# Patient Record
Sex: MALE | Race: WHITE | NOT HISPANIC OR LATINO | Employment: FULL TIME | URBAN - METROPOLITAN AREA
[De-identification: names, ages, dates, MRNs, and addresses within clinical notes are randomized per-mention and may not be internally consistent; named-entity substitution may affect disease eponyms.]

---

## 2018-06-28 ENCOUNTER — OFFICE VISIT (OUTPATIENT)
Dept: ORTHOPEDIC SURGERY | Facility: CLINIC | Age: 53
End: 2018-06-28

## 2018-06-28 VITALS — OXYGEN SATURATION: 98 % | BODY MASS INDEX: 34.4 KG/M2 | WEIGHT: 240.3 LBS | HEIGHT: 70 IN | HEART RATE: 80 BPM

## 2018-06-28 DIAGNOSIS — G56.22 CUBITAL TUNNEL SYNDROME ON LEFT: ICD-10-CM

## 2018-06-28 DIAGNOSIS — M19.022 ARTHRITIS OF LEFT ELBOW: Primary | ICD-10-CM

## 2018-06-28 DIAGNOSIS — G56.03 BILATERAL CARPAL TUNNEL SYNDROME: ICD-10-CM

## 2018-06-28 DIAGNOSIS — M25.521 RIGHT ELBOW PAIN: ICD-10-CM

## 2018-06-28 PROCEDURE — 20526 THER INJECTION CARP TUNNEL: CPT | Performed by: ORTHOPAEDIC SURGERY

## 2018-06-28 PROCEDURE — 20605 DRAIN/INJ JOINT/BURSA W/O US: CPT | Performed by: ORTHOPAEDIC SURGERY

## 2018-06-28 PROCEDURE — 99203 OFFICE O/P NEW LOW 30 MIN: CPT | Performed by: ORTHOPAEDIC SURGERY

## 2018-06-28 RX ORDER — FENOFIBRATE 160 MG/1
160 TABLET ORAL NIGHTLY
Refills: 3 | COMMUNITY
Start: 2018-06-20

## 2018-06-28 RX ORDER — ALPRAZOLAM 0.5 MG/1
0.5 TABLET ORAL 3 TIMES DAILY PRN
Refills: 2 | COMMUNITY
Start: 2018-06-09

## 2018-06-28 RX ORDER — LIDOCAINE HYDROCHLORIDE 10 MG/ML
1 INJECTION, SOLUTION INFILTRATION; PERINEURAL
Status: COMPLETED | OUTPATIENT
Start: 2018-06-28 | End: 2018-06-28

## 2018-06-28 RX ORDER — HYDROCODONE BITARTRATE AND ACETAMINOPHEN 7.5; 325 MG/1; MG/1
1 TABLET ORAL 3 TIMES DAILY PRN
Refills: 0 | COMMUNITY
Start: 2018-05-21 | End: 2019-03-20 | Stop reason: HOSPADM

## 2018-06-28 RX ORDER — TRIAMCINOLONE ACETONIDE 40 MG/ML
20 INJECTION, SUSPENSION INTRA-ARTICULAR; INTRAMUSCULAR
Status: COMPLETED | OUTPATIENT
Start: 2018-06-28 | End: 2018-06-28

## 2018-06-28 RX ORDER — IPRATROPIUM BROMIDE 42 UG/1
1 SPRAY, METERED NASAL 2 TIMES DAILY PRN
COMMUNITY
Start: 2018-05-15

## 2018-06-28 RX ORDER — DICLOFENAC SODIUM 75 MG/1
75 TABLET, DELAYED RELEASE ORAL 2 TIMES DAILY
Refills: 2 | COMMUNITY
Start: 2018-06-11 | End: 2019-01-28

## 2018-06-28 RX ORDER — TESTOSTERONE CYPIONATE 200 MG/ML
INJECTION, SOLUTION INTRAMUSCULAR
Refills: 1 | COMMUNITY
Start: 2018-06-19

## 2018-06-28 RX ORDER — LISINOPRIL 20 MG/1
20 TABLET ORAL DAILY
Refills: 3 | COMMUNITY
Start: 2018-06-12

## 2018-06-28 RX ORDER — MONTELUKAST SODIUM 10 MG/1
10 TABLET ORAL NIGHTLY
Refills: 5 | COMMUNITY
Start: 2018-06-12

## 2018-06-28 RX ORDER — DESLORATADINE 5 MG/1
TABLET ORAL
Refills: 6 | COMMUNITY
Start: 2018-06-12 | End: 2019-03-20 | Stop reason: HOSPADM

## 2018-06-28 RX ORDER — ATORVASTATIN CALCIUM 40 MG/1
40 TABLET, FILM COATED ORAL DAILY
Refills: 3 | COMMUNITY
Start: 2018-06-20

## 2018-06-28 RX ORDER — AMLODIPINE BESYLATE 5 MG/1
5 TABLET ORAL DAILY
Refills: 3 | COMMUNITY
Start: 2018-06-12

## 2018-06-28 RX ADMIN — TRIAMCINOLONE ACETONIDE 20 MG: 40 INJECTION, SUSPENSION INTRA-ARTICULAR; INTRAMUSCULAR at 10:55

## 2018-06-28 RX ADMIN — TRIAMCINOLONE ACETONIDE 20 MG: 40 INJECTION, SUSPENSION INTRA-ARTICULAR; INTRAMUSCULAR at 10:57

## 2018-06-28 RX ADMIN — TRIAMCINOLONE ACETONIDE 20 MG: 40 INJECTION, SUSPENSION INTRA-ARTICULAR; INTRAMUSCULAR at 10:51

## 2018-06-28 RX ADMIN — LIDOCAINE HYDROCHLORIDE 1 ML: 10 INJECTION, SOLUTION INFILTRATION; PERINEURAL at 10:57

## 2018-06-28 RX ADMIN — LIDOCAINE HYDROCHLORIDE 1 ML: 10 INJECTION, SOLUTION INFILTRATION; PERINEURAL at 10:51

## 2018-06-28 RX ADMIN — LIDOCAINE HYDROCHLORIDE 1 ML: 10 INJECTION, SOLUTION INFILTRATION; PERINEURAL at 10:55

## 2018-06-28 NOTE — PROGRESS NOTES
[facility] [place] [facility]opa[facility] Surgery Clinic Note    Subjective     Pain of the Left Elbow (Left elbow pain/Started about 3 weeks ago/Pain is at a 1-2 today/Patient has taken ibuprofen, and diclofenac.  Wears a brace some.)      FADIA Iverson is a 52 y.o. male. Patient is here today for several problems.  He has numbness and tingling in both of his hands right greater than left.  He occasionally will get shooting pain down the ulnar side of his forearm and hand when he does too much with his elbow.  He was bulging his girlfriend's yard recently when he experienced significant left elbow swelling and pain.  He is tried over-the-counter medications.  He denies any history of major trauma to the left elbow.  He has been wearing some braces at night for his hands and they've helped a little bit.     Past Medical History:   Diagnosis Date   • Hyperlipidemia    • Hypertriglyceridemia       Past Surgical History:   Procedure Laterality Date   • HERNIA REPAIR     • KNEE SURGERY Right 2010    scope   • SHOULDER SURGERY Right 2010    labrium and rotator cuff      Family History   Problem Relation Age of Onset   • No Known Problems Mother    • Cancer Father      Social History     Social History   • Marital status: Single     Spouse name: N/A   • Number of children: N/A   • Years of education: N/A     Occupational History   • Not on file.     Social History Main Topics   • Smoking status: Never Smoker   • Smokeless tobacco: Never Used   • Alcohol use Yes      Comment: 3-4   • Drug use: No   • Sexual activity: Defer     Other Topics Concern   • Not on file     Social History Narrative   • No narrative on file      No current outpatient prescriptions on file prior to visit.     No current facility-administered medications on file prior to visit.       Allergies   Allergen Reactions   • Keflex [Cephalexin] Hives        The following portions of the patient's history were reviewed and updated as appropriate: allergies, current  "medications, past family history, past medical history, past social history, past surgical history and problem list.    Review of Systems   Constitutional: Negative.    HENT: Positive for congestion.    Eyes: Negative.    Respiratory: Negative.    Cardiovascular: Negative.    Gastrointestinal: Negative.    Endocrine: Negative.    Genitourinary: Negative.    Musculoskeletal: Positive for arthralgias and joint swelling.   Skin: Negative.    Allergic/Immunologic: Positive for environmental allergies.   Neurological: Negative.    Psychiatric/Behavioral: Negative.         Objective      Physical Exam  Pulse 80   Ht 176.5 cm (69.5\")   Wt 109 kg (240 lb 4.8 oz)   SpO2 98%   BMI 34.98 kg/m²     Body mass index is 34.98 kg/m².    General  Mental Status - alert  General Appearance - cooperative, well groomed, not in acute distress  Orientation - Oriented X3  Build & Nutrition - well developed and well nourished  Posture - normal posture  Gait - normal gait     Integumentary  Global Assessment  Examination of related systems reveals - no lymphadenopathy  General Characteristics  Overall examination of the patient's skin reveals - no rashes, no evidence of scars, no suspicious lesions and no bruises.  Color - normal coloration of skin.    Ortho Exam  Peripheral Vascular   Bilateral Upper Extremity    No cyanotic nail beds    Pink nail beds and rapid capillary refill   Palpation    Radial Pulse - Bilaterally normal    Neurologic   Sensory: Light touch intact- Right and left hand    Left Upper Extremity    Left wrist extensors: 5/5    Left wrist flexors: 5/5    Left intrinsics: 5/5      Right Upper Extremity    Right wrist extensors: 5/5    Right wrist flexors: 5/5    Right intrinsics: 5/5    Musculoskeletal   Left Elbow    Forearm supination: AROM - 70 degrees    Forearm pronation: AROM - 70 degrees    Flexion: 120    Extension: 15     Inspection and Palpation   Right Wrist      Tenderness - none    Swelling - " none    Crepitus - none    Muscle tone - no atrophy   Left Wrist    Tenderness - none    Swelling - none    Crepitus - none    Muscle tone - no atrophy     ROJM:   Left Wrist    Flexion: AROM - 90 degrees    Extension: AROM - 90 degrees   Right Wrist    Flexion: AROM - 90 degrees    Extension: AROM - 90 degrees     Deformities, Malalignments, Discrepancies    None     Functional Testing   Right    Tinel's Sign-- negative    Phalen's Sign--negative    Carpal Compression Test--positive    Thenar wasting--minimal    APB--4+/5    Elbow Flexion test--negative    Cubital tunnel signs--negative   Left     Tinel's Sign--negative    Phalen's Sign--negative    Carpal Compression Test-- positive    Thenar Wasting--minimal    APB--4+/5    Elbow Flexion test--mildly positive    Cubital tunnel signs--mildly positive       Strength and Tone    Right  strength: good    Left  strength: good         Imaging/Studies  Imaging Results (last 24 hours)     Procedure Component Value Units Date/Time    XR Elbow 3+ View Left [[id number]] Resulted:  06/28/18 1032     Updated:  06/28/18 1032    Narrative:       Left Elbow X-Ray    Indication: Pain    Views: AP, Oblique, and Lateral views    Comparison: None    Findings:  No fracture  No bony lesion  Normal soft tissues  Patient has moderate arthritis at the ulnohumeral joint.  There are   osteophytes seen posteriorly.    Impression: Osteoarthritis left elbow.              We have reviewed nerve studies of the patient's bilateral upper extremities read by Dr. Floyd in [place].  Patient has median nerve motor latency of 4.6 ms on the left and 5.2 ms on the right.  Interpretation of a moderate median nerve entrapment bilaterally.  No mention of a cubital tunnel syndrome or ulnar neuropathy.    Assessment:  1. Arthritis of left elbow    2. Right elbow pain    3. Cubital tunnel syndrome on left    4. Bilateral carpal tunnel syndrome        Plan:  1. Continue over-the-counter medications as  needed for discomfort  2. Diagnostic and therapeutic injection will be given into each carpal tunnel today  3. Patient continues to have left elbow pain and swelling and we will inject his ulnar humeral joint for diagnostic and therapeutic purposes as well.  4. We will assess efficacy of the injections into the carpal tunnel as well as a left elbow when he returns in about 4-6 weeks.  If the patient ever undergoes surgery on the left carpal tunnel, we need to keep in mind that he may need a cubital tunnel release at the same time to get complete resolution of his neurologic symptoms.      Medical Decision Making  Management Options : over-the-counter medicine and prescription/IM medicine  Data/Risk: radiology tests and independent visualization of imaging, lab tests, or EMG/NCV    Rolando Mosqueda MD  06/28/18  6:57 PM

## 2018-06-28 NOTE — PROGRESS NOTES
Procedure   Medium Joint Arthrocentesis  Date/Time: 6/28/2018 10:51 AM  Consent given by: patient  Site marked: site marked  Timeout: Immediately prior to procedure a time out was called to verify the correct patient, procedure, equipment, support staff and site/side marked as required   Supporting Documentation  Indications: pain   Procedure Details  Location: wrist - R intercarpal  Preparation: Patient was prepped and draped in the usual sterile fashion  Needle size: 25 G (short)  Approach: volar  Medications administered: 1 mL lidocaine 1 %; 20 mg triamcinolone acetonide 40 MG/ML  Patient tolerance: patient tolerated the procedure well with no immediate complications    Medium Joint Arthrocentesis  Date/Time: 6/28/2018 10:55 AM  Consent given by: patient  Site marked: site marked  Timeout: Immediately prior to procedure a time out was called to verify the correct patient, procedure, equipment, support staff and site/side marked as required   Supporting Documentation  Indications: pain   Procedure Details  Location: wrist - L intercarpal  Preparation: Patient was prepped and draped in the usual sterile fashion  Needle size: 25 G (short)  Approach: volar  Medications administered: 1 mL lidocaine 1 %; 20 mg triamcinolone acetonide 40 MG/ML  Patient tolerance: patient tolerated the procedure well with no immediate complications    Medium Joint Arthrocentesis  Date/Time: 6/28/2018 10:57 AM  Consent given by: patient  Site marked: site marked  Timeout: Immediately prior to procedure a time out was called to verify the correct patient, procedure, equipment, support staff and site/side marked as required   Supporting Documentation  Indications: pain   Procedure Details  Location: elbow - L elbow  Preparation: Patient was prepped and draped in the usual sterile fashion  Needle size: 25 G  Approach: superior  Medications administered: 1 mL lidocaine 1 %; 20 mg triamcinolone acetonide 40 MG/ML

## 2018-08-14 ENCOUNTER — OFFICE VISIT (OUTPATIENT)
Dept: ORTHOPEDIC SURGERY | Facility: CLINIC | Age: 53
End: 2018-08-14

## 2018-08-14 VITALS — HEART RATE: 83 BPM | BODY MASS INDEX: 34.68 KG/M2 | WEIGHT: 234.13 LBS | OXYGEN SATURATION: 98 % | HEIGHT: 69 IN

## 2018-08-14 DIAGNOSIS — G56.03 BILATERAL CARPAL TUNNEL SYNDROME: Primary | ICD-10-CM

## 2018-08-14 DIAGNOSIS — M25.521 RIGHT ELBOW PAIN: ICD-10-CM

## 2018-08-14 DIAGNOSIS — M19.022 ARTHRITIS OF LEFT ELBOW: ICD-10-CM

## 2018-08-14 DIAGNOSIS — G56.22 CUBITAL TUNNEL SYNDROME ON LEFT: ICD-10-CM

## 2018-08-14 PROCEDURE — 99214 OFFICE O/P EST MOD 30 MIN: CPT | Performed by: ORTHOPAEDIC SURGERY

## 2018-08-14 RX ORDER — SILDENAFIL CITRATE 20 MG/1
TABLET ORAL
Refills: 5 | COMMUNITY
Start: 2018-08-06

## 2018-08-14 RX ORDER — SYRINGE WITH NEEDLE, 1 ML 25GX5/8"
SYRINGE, EMPTY DISPOSABLE MISCELLANEOUS SEE ADMIN INSTRUCTIONS
Refills: 5 | COMMUNITY
Start: 2018-07-16 | End: 2019-03-07

## 2018-08-14 NOTE — PROGRESS NOTES
"    [facility] [place] Orthopaedic Surgery Clinic Note    Subjective     CC: Follow-up (7 week f/u Cubital tunnel syndrome on left; Bilateral carpal tunnel syndrome )      FADIA Iverson is a 52 y.o. male.  Patient returns today for follow-up of his multiple problems.  His initial visit with me, his left ulnohumeral joint was injected in both carpal tunnels were injected.  He got dramatic relief from all 3 injections.  The numbness and tingling and fullness of his hands has improved dramatically and unfortunately starting to come back however.  Patient's elbow pain is much better overall.       ROS:    Constiutional:Pt denies fever, chills, nausea, or vomiting.  MSK:as above    Objective      Past Medical History  Past Medical History:   Diagnosis Date   • Hyperlipidemia    • Hypertriglyceridemia          Physical Exam  Pulse 83   Ht 176.5 cm (69.49\")   Wt 106 kg (234 lb 2.1 oz)   SpO2 98%   BMI 34.09 kg/m²     Body mass index is 34.09 kg/m².    Patient is well nourished and well developed.        Ortho Exam  Peripheral Vascular   Bilateral Upper Extremity    No cyanotic nail beds    Pink nail beds and rapid capillary refill   Palpation    Radial Pulse - Bilaterally normal    Neurologic   Sensory: Light touch intact- Right and left hand    Left Upper Extremity    Left wrist extensors: 5/5    Left wrist flexors: 5/5    Left intrinsics: 5/5   Right Upper Extremity    Right wrist extensors: 5/5    Right wrist flexors: 5/5    Right intrinsics: 5/5    Musculoskeletal   Left Elbow    Forearm supination: AROM - 90 degrees    Forearm pronation: AROM - 90 degrees   Right Elbow    Forearm supination: AROM - 90 degrees    Forearm pronation: AROM - 90 degrees     Inspection and Palpation   Right Wrist      Tenderness - none    Swelling - none    Crepitus - none    Muscle tone - no atrophy   Left Wrist    Tenderness - none    Swelling - none    Crepitus - none    Muscle tone - no atrophy     ROJM:   Left Wrist    Flexion: AROM - " 90 degrees    Extension: AROM - 90 degrees   Right Wrist    Flexion: AROM - 90 degrees    Extension: AROM - 90 degrees     Deformities, Malalignments, Discrepancies    None     Functional Testing   Right Wrist    Tinel's Sign negative    Phalen's Sign negative    Carpal Compression Test negative   Left Wrist    Tinel's Sign negative    Phalen's Sign negative    Carpal Compression Test negative       Strength and Tone    Right  strength: good    Left  strength: good        Assessment:  1. Arthritis of left elbow    2. Right elbow pain    3. Cubital tunnel syndrome on left    4. Bilateral carpal tunnel syndrome        Plan:  1. Recommend over the counter anti-inflammatories for pain and/or swelling  2. Patient I believe would be a good candidate for bilateral carpal tunnel release.  He has received excellent results from his injections.  We discussed the risks, benefits, potential hazards, typical recovery and rehabilitation as well as reasonable alternatives to carpal tunnel release.  Patient will need to choose which side he wants to do first and then 3 weeks later, the contralateral side can be done.  He discussed this plan with the patient and his significant other today who accompanies him.  They will call back for scheduling.  We can leave the ulnar nerve alone for now.        Medical Decision Making  Management Options : over-the-counter medicine and major surgery with risk factors      Rolando Mosqueda MD  08/14/18  5:29 PM   wheezing

## 2018-11-08 ENCOUNTER — OFFICE VISIT (OUTPATIENT)
Dept: ORTHOPEDIC SURGERY | Facility: CLINIC | Age: 53
End: 2018-11-08

## 2018-11-08 VITALS — WEIGHT: 247.14 LBS | HEART RATE: 82 BPM | HEIGHT: 70 IN | BODY MASS INDEX: 35.38 KG/M2 | OXYGEN SATURATION: 98 %

## 2018-11-08 DIAGNOSIS — M16.11 ARTHRITIS OF RIGHT HIP: ICD-10-CM

## 2018-11-08 DIAGNOSIS — M25.551 RIGHT HIP PAIN: Primary | ICD-10-CM

## 2018-11-08 DIAGNOSIS — G56.03 BILATERAL CARPAL TUNNEL SYNDROME: ICD-10-CM

## 2018-11-08 PROCEDURE — 99214 OFFICE O/P EST MOD 30 MIN: CPT | Performed by: PHYSICIAN ASSISTANT

## 2018-11-08 NOTE — PROGRESS NOTES
"        [facility] [place] [facility] [place]    Subjective     Patient: Patricio Iverson  : 1965    Primary Care Provider: Sarbjit Rebolledo MD    Requesting Provider: As above    Pain of the Right Hip      History    Chief Complaint: Right hip pain    History of Present Illness: This is a very pleasant 52 yo male patient of Dr. Ashley, new to me, presenting with 4 to 6 week history of right groin and buttock pain.  He denies any trauma or injury.  He has both weight bearing and night pain.  He complains of some radiating anterior thigh pain.  He has acing, stabbing and shooting pain that is 5/10.  He denies any mechanical symptoms.  He has a history of sciatica and thought that this pain was coming from his back.  He has treated with ice, ibuprofen, and rest without improved pain.  He is here for further evaluation and treatment recommendations.    Current Outpatient Prescriptions on File Prior to Visit   Medication Sig Dispense Refill   • ALPRAZolam (XANAX) 0.5 MG tablet Take 0.5 mg by mouth 3 (Three) Times a Day As Needed.  2   • amLODIPine (NORVASC) 5 MG tablet Take 5 mg by mouth Daily.  3   • atorvastatin (LIPITOR) 40 MG tablet Take 40 mg by mouth Daily.  3   • B-D 3CC LUER-KIET SYR 23GX1\" 23G X 1\" 3 ML misc See Admin Instructions.  5   • desloratadine (CLARINEX) 5 MG tablet TAKE ONE TABLET BY MOUTH EVERY DAY AT BEDTIME AS DIRECTED  6   • diclofenac (VOLTAREN) 75 MG EC tablet Take 75 mg by mouth 2 (Two) Times a Day.  2   • fenofibrate 160 MG tablet Take 160 mg by mouth Daily.  3   • HYDROcodone-acetaminophen (NORCO) 7.5-325 MG per tablet Take 1 tablet by mouth 3 (Three) Times a Day As Needed.  0   • ipratropium (ATROVENT) 0.06 % nasal spray      • lisinopril (PRINIVIL,ZESTRIL) 20 MG tablet Take 20 mg by mouth Daily.  3   • montelukast (SINGULAIR) 10 MG tablet Take 10 mg by mouth Daily.  5   • sildenafil (REVATIO) 20 MG tablet TAKE UP TO 3 TABLETS BY MOUTH EVERY DAY AS NEEDED  5   • Testosterone " "Cypionate (DEPOTESTOTERONE CYPIONATE) 200 MG/ML injection INJECT 1 ML IM EVERY 10 DAYS  1     No current facility-administered medications on file prior to visit.       Allergies   Allergen Reactions   • Keflex [Cephalexin] Hives      Past Medical History:   Diagnosis Date   • Hyperlipidemia    • Hypertriglyceridemia      Past Surgical History:   Procedure Laterality Date   • DENTAL PROCEDURE  08/2018    Dental Implants   • HERNIA REPAIR     • KNEE SURGERY Right 2010    scope   • SHOULDER SURGERY Right 2010    labrium and rotator cuff     Family History   Problem Relation Age of Onset   • No Known Problems Mother    • Cancer Father       Social History     Social History   • Marital status: Single     Spouse name: N/A   • Number of children: N/A   • Years of education: N/A     Occupational History   • Not on file.     Social History Main Topics   • Smoking status: Never Smoker   • Smokeless tobacco: Never Used   • Alcohol use Yes      Comment: 3-4   • Drug use: No   • Sexual activity: Defer     Other Topics Concern   • Not on file     Social History Narrative   • No narrative on file        Review of Systems   HENT: Positive for postnasal drip and sinus pressure.    Eyes: Negative.    Respiratory: Negative.    Cardiovascular: Negative.    Gastrointestinal: Negative.    Endocrine: Negative.    Genitourinary: Negative.    Musculoskeletal: Positive for arthralgias (hip pain) and back pain.   Skin: Negative.    Allergic/Immunologic: Negative.    Neurological: Positive for weakness.   Hematological: Negative.    Psychiatric/Behavioral: Negative.        The following portions of the patient's history were reviewed and updated as appropriate: allergies, current medications, past family history, past medical history, past social history, past surgical history and problem list.      Objective      Physical Exam  Pulse 82   Ht 177.8 cm (70\")   Wt 112 kg (247 lb 2.2 oz)   SpO2 98%   BMI 35.46 kg/m²     Body mass index is " 35.46 kg/m².    Patient is well developed, well nourished    Ortho Exam    Right  hip    RANGE OF MOTION:   FLEXION CONTRACTURE: None   FLEXION: 110 degrees   INTERNAL ROTATION: 10 degrees at 90 degrees of flexion   EXTERNAL ROTATION: 40 degrees at 90 degrees of flexion    PAIN WITH HIP MOTION: moderate pain   PAIN WITH LOGROLL: no  STINCHFIELD TEST: mildly positive    STRENGTH:  5/5 hip adduction, abduction, flexion. 5/5 strength knee flexion, extension. 5/5 strength ankle dorsiflexion and plantarflexion.     GREATER TROCHANTER BURSAL PAIN:  No    SENSATION TO LIGHT TOUCH:  DEEP PERONEAL/SUPERFICIAL PERONEAL/SURAL/SAPHENOUS/TIBIAL:  intact    STRAIGHT LEG TEST:   negative      Left hip exam:    RANGE OF MOTION:   FLEXION CONTRACTURE: None   FLEXION: 110 degrees   INTERNAL ROTATION: 20 degrees at 90 degrees of flexion   EXTERNAL ROTATION: 40 degrees at 90 degrees of flexion    PAIN WITH HIP MOTION: no  PAIN WITH LOGROLL: no  STINCHFIELD TEST: negative    STRENGTH:  5/5 hip adduction, abduction, flexion. 5/5 strength knee flexion, extension. 5/5 strength ankle dorsiflexion and plantarflexion.     GREATER TROCHANTER BURSAL PAIN:  No    SENSATION TO LIGHT TOUCH:  DEEP PERONEAL/SUPERFICIAL PERONEAL/SURAL/SAPHENOUS/TIBIAL:  intact    STRAIGHT LEG TEST:   negative      Medical Decision Making    Data Review:   ordered and reviewed x-rays today    Assessment:  1. Right hip pain    2. Arthritis of right hip    3. Bilateral carpal tunnel syndrome        Plan:  1.  Right hip arthritis.  I reviewed  today's x-rays and clinical findings the patient.  On exam, he has stiffness of the right hip with mildly positive Stinchfield.  X-rays today show end-stage right hip arthritis with no evidence of fracture or anything more worrisome.  We discussed further treatment options for hip arthritis including intra-articular Tickler steroid injection or hip replacement.  In the long run, he is going to need hip replacement.  We had a  discussion regarding intra-articular hip injection and that is not as reliable as an injection in the knee.  I explained that he cannot be done in the office but has to be done under fluoroscopy.  Patient has a busy winter ahead with pain hallux and is not ready to pursue surgery immediately.  I told him should he have intra-articular injection, he would have to wait 3 months to have surgery.  Plan today is that he'll return next week on Wednesday well and with Dr. Graham to possibly schedule an intra-articular hip injection.  I told him that Dr. Graham is scheduling out to mid February for joint replacement at this time anyway.  This will give him 3 months.  After injection.  I will see him back next Wednesday.    2.  Patient has bilateral carpal tunnel syndrome and has been evaluated in the past by Dr. Mosqueda.  The right is more painful than the left.  He is ready to pursue right carpal tunnel release.  We will have him see Marielena on his way out to schedule that surgery.  Serenity Moeller PA-C  11/08/18  4:52 PM

## 2018-11-14 ENCOUNTER — OFFICE VISIT (OUTPATIENT)
Dept: ORTHOPEDIC SURGERY | Facility: CLINIC | Age: 53
End: 2018-11-14

## 2018-11-14 VITALS — HEIGHT: 70 IN | HEART RATE: 94 BPM | WEIGHT: 246.91 LBS | OXYGEN SATURATION: 98 % | BODY MASS INDEX: 35.35 KG/M2

## 2018-11-14 DIAGNOSIS — M16.11 ARTHRITIS OF RIGHT HIP: Primary | ICD-10-CM

## 2018-11-14 PROCEDURE — 99213 OFFICE O/P EST LOW 20 MIN: CPT | Performed by: PHYSICIAN ASSISTANT

## 2018-11-14 NOTE — PROGRESS NOTES
"        [facility] [place] Orthopaedic Surgery Clinic Note    Subjective     Patient: Patricio Iverson  : 1965    Primary Care Provider: Sarbjit Rebolledo MD    Requesting Provider: As above    Follow-up of the Right Hip (1 week)      History    Chief Complaint: Right groin pain    History of Present Illness: Patient returns today discuss his further treatment options for his end-stage right hip arthritis with Dr. Graham.  He reports no change in his pain since I saw him last week.  He continues to have daily pain that is 6-7/10 and stabbing and aching.    Current Outpatient Medications on File Prior to Visit   Medication Sig Dispense Refill   • ALPRAZolam (XANAX) 0.5 MG tablet Take 0.5 mg by mouth 3 (Three) Times a Day As Needed.  2   • amLODIPine (NORVASC) 5 MG tablet Take 5 mg by mouth Daily.  3   • atorvastatin (LIPITOR) 40 MG tablet Take 40 mg by mouth Daily.  3   • B-D 3CC LUER-KIET SYR 23GX1\" 23G X 1\" 3 ML misc See Admin Instructions.  5   • desloratadine (CLARINEX) 5 MG tablet TAKE ONE TABLET BY MOUTH EVERY DAY AT BEDTIME AS DIRECTED  6   • diclofenac (VOLTAREN) 75 MG EC tablet Take 75 mg by mouth 2 (Two) Times a Day.  2   • fenofibrate 160 MG tablet Take 160 mg by mouth Daily.  3   • HYDROcodone-acetaminophen (NORCO) 7.5-325 MG per tablet Take 1 tablet by mouth 3 (Three) Times a Day As Needed.  0   • ipratropium (ATROVENT) 0.06 % nasal spray      • lisinopril (PRINIVIL,ZESTRIL) 20 MG tablet Take 20 mg by mouth Daily.  3   • montelukast (SINGULAIR) 10 MG tablet Take 10 mg by mouth Daily.  5   • sildenafil (REVATIO) 20 MG tablet TAKE UP TO 3 TABLETS BY MOUTH EVERY DAY AS NEEDED  5   • Testosterone Cypionate (DEPOTESTOTERONE CYPIONATE) 200 MG/ML injection INJECT 1 ML IM EVERY 10 DAYS  1     No current facility-administered medications on file prior to visit.       Allergies   Allergen Reactions   • Keflex [Cephalexin] Hives      Past Medical History:   Diagnosis Date   • Hyperlipidemia    • Hypertriglyceridemia  " "    Past Surgical History:   Procedure Laterality Date   • DENTAL PROCEDURE  08/2018    Dental Implants   • HERNIA REPAIR     • KNEE SURGERY Right 2010    scope   • SHOULDER SURGERY Right 2010    labrium and rotator cuff     Family History   Problem Relation Age of Onset   • No Known Problems Mother    • Cancer Father       Social History     Socioeconomic History   • Marital status: Single     Spouse name: Not on file   • Number of children: Not on file   • Years of education: Not on file   • Highest education level: Not on file   Social Needs   • Financial resource strain: Not on file   • Food insecurity - worry: Not on file   • Food insecurity - inability: Not on file   • Transportation needs - medical: Not on file   • Transportation needs - non-medical: Not on file   Occupational History   • Not on file   Tobacco Use   • Smoking status: Never Smoker   • Smokeless tobacco: Never Used   Substance and Sexual Activity   • Alcohol use: Yes     Comment: 3-4   • Drug use: No   • Sexual activity: Defer   Other Topics Concern   • Not on file   Social History Narrative   • Not on file        Review of Systems   Constitutional: Negative.    HENT: Negative.    Eyes: Negative.    Respiratory: Negative.    Cardiovascular: Negative.    Gastrointestinal: Negative.    Endocrine: Negative.    Genitourinary: Negative.    Musculoskeletal: Positive for arthralgias.   Skin: Negative.    Allergic/Immunologic: Negative.    Neurological: Negative.    Hematological: Negative.    Psychiatric/Behavioral: Negative.        The following portions of the patient's history were reviewed and updated as appropriate: allergies, current medications, past family history, past medical history, past social history, past surgical history and problem list.      Objective      Physical Exam  Pulse 94   Ht 177.8 cm (70\")   Wt 112 kg (246 lb 14.6 oz)   SpO2 98%   BMI 35.43 kg/m²     Body mass index is 35.43 kg/m².    Patient is well nourished and well " developed.      Ortho Exam  Right  hip    RANGE OF MOTION:   Stiffness with groin pain reproduced with hip rotation     STRENGTH:  5/5 hip adduction, abduction, flexion. 5/5 strength knee flexion, extension. 5/5 strength ankle dorsiflexion and plantarflexion.       Medical Decision Making    Data Review:   none    Assessment:  1. Arthritis of right hip        Plan:  End-stage right hip arthritis.  I reviewed clinical findings, past and current treatment the patient today patient.  Patient is here to discuss his further options with Dr. Graham including intra-articular steroid injection versus total hip replacement.  We discussed with the patient that given his young age of 53, should he have hip replacement at this point he would likely need revision.  I would recommend an intra-articular joint injection prior to jumping to hip replacement.  I explained the use of the intra-articular injection to be both therapeutic and diagnostic.  There is some question by the patient whether all of his pain is coming from her hip or some is coming from his back.  Injection loss so show us how much relief he will get from the hip replacement even if he has released from the injection for only couple of days.  I explained Dr. Graham does his intra-articular injections at the outpatient surgery center.  Plan today is to get him scheduled for right hip intra-articular injection with Dr. Graham.        11/15/18  3:08 PM

## 2018-11-30 ENCOUNTER — OUTSIDE FACILITY SERVICE (OUTPATIENT)
Dept: ORTHOPEDIC SURGERY | Facility: CLINIC | Age: 53
End: 2018-11-30

## 2018-11-30 PROCEDURE — 20610 DRAIN/INJ JOINT/BURSA W/O US: CPT | Performed by: ORTHOPAEDIC SURGERY

## 2018-11-30 PROCEDURE — 77002 NEEDLE LOCALIZATION BY XRAY: CPT | Performed by: ORTHOPAEDIC SURGERY

## 2018-12-03 ENCOUNTER — OUTSIDE FACILITY SERVICE (OUTPATIENT)
Dept: ORTHOPEDIC SURGERY | Facility: CLINIC | Age: 53
End: 2018-12-03

## 2018-12-03 PROCEDURE — 64721 CARPAL TUNNEL SURGERY: CPT | Performed by: ORTHOPAEDIC SURGERY

## 2018-12-18 ENCOUNTER — OFFICE VISIT (OUTPATIENT)
Dept: ORTHOPEDIC SURGERY | Facility: CLINIC | Age: 53
End: 2018-12-18

## 2018-12-18 DIAGNOSIS — Z98.890 S/P CARPAL TUNNEL RELEASE: ICD-10-CM

## 2018-12-18 DIAGNOSIS — G56.03 BILATERAL CARPAL TUNNEL SYNDROME: Primary | ICD-10-CM

## 2018-12-18 PROCEDURE — 99024 POSTOP FOLLOW-UP VISIT: CPT | Performed by: ORTHOPAEDIC SURGERY

## 2018-12-18 RX ORDER — GABAPENTIN 800 MG/1
400 TABLET ORAL 2 TIMES DAILY
Refills: 4 | COMMUNITY
Start: 2018-12-05

## 2018-12-18 NOTE — PROGRESS NOTES
[facility] [place] Orthopaedic Surgery Clinic Note    Subjective     Post-op (2 WEEK F/U , S/P RIGHT CARPAL TUNNEL RELEASE 12/03/18)       HPI    Patricio Iverson is a 53 y.o. male.  Patient is here today 2 weeks out from right carpal tunnel release.  He has been working and getting his hands dirty.  He denies fever, chills, nausea or vomiting.  He is with his girlfriend today.        Objective      Physical Exam  There were no vitals taken for this visit.    There is no height or weight on file to calculate BMI.        Ortho Exam    Right Upper Extremity:        Musculoskeletal     Inspection and Palpation:      Hand/Wrist:      Tenderness - none    Swelling - minimal     ROM:  Slightly diminished but within normal limits       Incision:  Healing appropriately, no drainage or erythema.  There is mild dehiscence without any deep dehiscence and drainage.          Assessment:  1. Bilateral carpal tunnel syndrome    2. S/P carpal tunnel release        Plan:  1. Suture removal today  2. Dressing changes  3. Overall, patient has been told to take it easier and do less with the hand to allow this to dry out and heal      Rolando Mosqueda MD  12/18/18  7:02 PM

## 2019-01-28 ENCOUNTER — OFFICE VISIT (OUTPATIENT)
Dept: ORTHOPEDIC SURGERY | Facility: CLINIC | Age: 54
End: 2019-01-28

## 2019-01-28 VITALS — HEART RATE: 82 BPM | WEIGHT: 242.95 LBS | BODY MASS INDEX: 34.78 KG/M2 | OXYGEN SATURATION: 98 % | HEIGHT: 70 IN

## 2019-01-28 DIAGNOSIS — M16.11 PRIMARY OSTEOARTHRITIS OF RIGHT HIP: Primary | ICD-10-CM

## 2019-01-28 PROCEDURE — 99213 OFFICE O/P EST LOW 20 MIN: CPT | Performed by: ORTHOPAEDIC SURGERY

## 2019-01-28 RX ORDER — PREGABALIN 150 MG/1
150 CAPSULE ORAL ONCE
Status: CANCELLED | OUTPATIENT
Start: 2019-01-28 | End: 2019-01-28

## 2019-01-28 RX ORDER — ACETAMINOPHEN 325 MG/1
1000 TABLET ORAL ONCE
Status: CANCELLED | OUTPATIENT
Start: 2019-01-28 | End: 2019-01-28

## 2019-01-28 RX ORDER — CHLORHEXIDINE GLUCONATE 4 G/100ML
SOLUTION TOPICAL DAILY
Qty: 236 ML | Refills: 0 | Status: ON HOLD | OUTPATIENT
Start: 2019-01-28 | End: 2019-03-19

## 2019-01-28 RX ORDER — MELOXICAM 7.5 MG/1
15 TABLET ORAL ONCE
Status: CANCELLED | OUTPATIENT
Start: 2019-01-28 | End: 2019-01-28

## 2019-01-28 NOTE — PROGRESS NOTES
I reviewed my findings with patient today. He would like to proceed with hip replacement surgery, knowing the risks, benefits, and alternatives.  Please see my counseling note for details.  He has exhausted conservative treatment options.      Surgical Counseling     I have informed the patient of the diagnosis and the prognosis.  Exhaustive conservative treatment modalities have not resulted in long term pain relief.  The symptoms have progressed to the point of daily pain and inability to perform activities of daily living without significant pain.  The patient has reached the point of desiring to proceed with total hip arthroplasty after discussing the risks, benefits and alternatives to the procedure.  The surgical procedure itself was discussed in detail.  Risks of the procedure were discussed, which included but are not limited to, bleeding, infection, damage to blood vessels and nerves, incomplete pain relief, loosening of the prosthesis, deep infection, need for further surgery, leg length discrepancy, hip dislocation, loss of limb, deep venous thrombosis, pulmonary embolus, death, heart attack, stroke, kidney failure, liver failure, and anesthetic complications.  In addition, the potential for deep infection developing in the future was discussed, which could require further surgery.  The hip would have to be re-opened, debrided, and potentially remove the prosthesis, which may or may not be replaced in the future.  Also, the possibility for loosening of the prosthesis has been mentioned.  If the prosthesis loosened, a revision arthroplasty could be performed, with results that are not as predictable compared to the original procedure.  The typical rehabilitative course has also been discussed, and full recovery may take up to a year to see the maximum benefit.  The importance of patient cooperation in the rehabilitative efforts has also been discussed.  No guarantees whatsoever were given.  The patient  understands the potential risks versus the benefits and desires to proceed with total hip arthroplasty at a mutually convenient time.      Medical Decision Making  Management Options : major surgery with risk factors      Anil Graham MD  01/28/19  12:46 PM

## 2019-01-29 ENCOUNTER — OFFICE VISIT (OUTPATIENT)
Dept: ORTHOPEDIC SURGERY | Facility: CLINIC | Age: 54
End: 2019-01-29

## 2019-01-29 DIAGNOSIS — Z98.890 S/P CARPAL TUNNEL RELEASE: Primary | ICD-10-CM

## 2019-01-29 DIAGNOSIS — G56.03 BILATERAL CARPAL TUNNEL SYNDROME: ICD-10-CM

## 2019-01-29 PROCEDURE — 99024 POSTOP FOLLOW-UP VISIT: CPT | Performed by: ORTHOPAEDIC SURGERY

## 2019-01-29 NOTE — PROGRESS NOTES
[facility] [place] [facility] Orth[facility] Clinic Note    Subjective     Post-op Follow-up (6 WEEK F/U ,8 weeks S/P RIGHT CARPAL TUNNEL RELEASE 12/03/18)       FADIA Iverson is a 53 y.o. male.  Patient is now a weeks out from right carpal tunnel release.  He's doing relatively well.  He has occasional episode of shooting pain in the right long digit but otherwise is doing quite well.         Objective      Physical Exam  There were no vitals taken for this visit.    There is no height or weight on file to calculate BMI.        Ortho Exam  Patient has full wrist range of motion  Incision has healed      Assessment:  1. S/P carpal tunnel release    2. Bilateral carpal tunnel syndrome        Plan:  1. Continue his therapeutic exercises and nerve glides  2. Overall he is doing quite well  3. Follow-up when necessary      Rolando Mosqueda MD  01/29/19  9:50 AM

## 2019-01-30 NOTE — PROGRESS NOTES
[facility] [place] [facility] Note    Subjective     Chief Complaint   Patient presents with   • Follow-up     Right hip injection 12/03/18        HPI    Patricio Iverson is a 53 y.o. male.  The patient followed up for his right hip.  He had severe pain in the right hip, and would like to proceed with hip replacement surgery.  He has exhausted conservative treatment options.  He did have good relief with the intra-articular hip injection, but it seemed to wear off.  Pain is worse with walking, standing and climbing stairs.  Pain is severe.  No long-term improvement with anti-inflammatories, intra-articular hip injection, and activity modifications.  Difficulty with weightbearing.      Patient Active Problem List   Diagnosis   • Primary osteoarthritis of right hip     Past Medical History:   Diagnosis Date   • Hyperlipidemia    • Hypertriglyceridemia       Past Surgical History:   Procedure Laterality Date   • DENTAL PROCEDURE  08/2018    Dental Implants   • HERNIA REPAIR     • KNEE SURGERY Right 2010    scope   • SHOULDER SURGERY Right 2010    labrium and rotator cuff   • WRIST SURGERY Right       Family History   Problem Relation Age of Onset   • No Known Problems Mother    • Cancer Father      Social History     Socioeconomic History   • Marital status: Single     Spouse name: Not on file   • Number of children: Not on file   • Years of education: Not on file   • Highest education level: Not on file   Social Needs   • Financial resource strain: Not on file   • Food insecurity - worry: Not on file   • Food insecurity - inability: Not on file   • Transportation needs - medical: Not on file   • Transportation needs - non-medical: Not on file   Occupational History   • Not on file   Tobacco Use   • Smoking status: Never Smoker   • Smokeless tobacco: Never Used   Substance and Sexual Activity   • Alcohol use: Yes     Comment: 3-4   • Drug use: No   • Sexual activity: Defer   Other Topics Concern   • Not on file  "  Social History Narrative   • Not on file      Current Outpatient Medications on File Prior to Visit   Medication Sig Dispense Refill   • ALPRAZolam (XANAX) 0.5 MG tablet Take 0.5 mg by mouth 3 (Three) Times a Day As Needed.  2   • amLODIPine (NORVASC) 5 MG tablet Take 5 mg by mouth Daily.  3   • atorvastatin (LIPITOR) 40 MG tablet Take 40 mg by mouth Daily.  3   • B-D 3CC LUER-KIET SYR 23GX1\" 23G X 1\" 3 ML misc See Admin Instructions.  5   • desloratadine (CLARINEX) 5 MG tablet TAKE ONE TABLET BY MOUTH EVERY DAY AT BEDTIME AS DIRECTED  6   • fenofibrate 160 MG tablet Take 160 mg by mouth Daily.  3   • gabapentin (NEURONTIN) 800 MG tablet Take 800 mg by mouth 3 (Three) Times a Day.  4   • HYDROcodone-acetaminophen (NORCO) 7.5-325 MG per tablet Take 1 tablet by mouth 3 (Three) Times a Day As Needed.  0   • ipratropium (ATROVENT) 0.06 % nasal spray      • lisinopril (PRINIVIL,ZESTRIL) 20 MG tablet Take 20 mg by mouth Daily.  3   • montelukast (SINGULAIR) 10 MG tablet Take 10 mg by mouth Daily.  5   • sildenafil (REVATIO) 20 MG tablet TAKE UP TO 3 TABLETS BY MOUTH EVERY DAY AS NEEDED  5   • Testosterone Cypionate (DEPOTESTOTERONE CYPIONATE) 200 MG/ML injection INJECT 1 ML IM EVERY 10 DAYS  1     No current facility-administered medications on file prior to visit.       Allergies   Allergen Reactions   • Keflex [Cephalexin] Hives        Review of Systems   Constitutional: Negative.    HENT: Negative.    Eyes: Negative.    Respiratory: Negative.    Cardiovascular: Negative.    Gastrointestinal: Negative.    Endocrine: Negative.    Genitourinary: Negative.    Musculoskeletal: Positive for gait problem and joint swelling.   Skin: Negative.    Allergic/Immunologic: Negative.    Hematological: Negative.    Psychiatric/Behavioral: Negative.         Objective      Physical Exam  Pulse 82   Ht 177.8 cm (70\")   Wt 110 kg (242 lb 15.2 oz)   SpO2 98%   BMI 34.86 kg/m²     Body mass index is 34.86 kg/m².    General:   Mental " Status:  Alert   Appearance: Cooperative, in no acute distress   Build and Nutrition: Well-nourished and well developed male   Orientation: Alert and oriented to person, place and time   Posture: Normal   Gait: Limping on the right    Integument:   Right hip: No skin lesions, no rash, no ecchymosis    Lower Extremity:   Right Hip:    Tenderness:  None    Swelling:  None    Crepitus:  None    Range of motion:  External Rotation: 20°       Internal Rotation: 10°       Flexion:  90°       Extension:  0°    Deformities:  None  Functional testing: Positive [facility]    Short on the right compared to the left          Assessment and Plan     Patricio was seen today for follow-up.    Diagnoses and all orders for this visit:    Primary osteoarthritis of right hip  -     Case Request; Standing  -     Instructions on coughing, deep breathing, and incentive spirometry.; Future  -     CBC and Differential; Future  -     Basic metabolic panel; Future  -     Protime-INR; Future  -     APTT; Future  -     Hemoglobin A1c; Future  -     Sedimentation rate; Future  -     C-reactive protein; Future  -     Urinalysis With Culture If Indicated - Urine, Clean Catch; Future  -     Tranexamic Acid 1,000 mg in sodium chloride 0.9 % 100 mL; Infuse 1,000 mg into a venous catheter 1 (One) Time.  -     Tranexamic Acid 1,000 mg in sodium chloride 0.9 % 100 mL; Infuse 1,000 mg into a venous catheter 1 (One) Time.  -     vancomycin (VANCOCIN) 1,750 mg in sodium chloride 0.9 % 250 mL IVPB; Infuse 1,750 mg into a venous catheter 1 (One) Time.  -     acetaminophen (TYLENOL) tablet 975 mg; Take 3 tablets by mouth 1 (One) Time.  -     meloxicam (MOBIC) tablet 15 mg; Take 2 tablets by mouth 1 (One) Time.  -     pregabalin (LYRICA) capsule 150 mg; Take 1 capsule by mouth 1 (One) Time.  -     mupirocin (BACTROBAN) 2 % nasal ointment 1 application; 1 application by Each Nare route 1 (One) Time.  -     Case Request    Other orders  -     Outpatient In A  Bed; Standing  -     Follow Anesthesia Guidelines / Standing Orders; Future  -     Obtain informed consent  -     Provide instructions to patient regarding NPO status  -     Clorhexidine skin prep  -     Follow Anesthesia Guidelines / Standing Orders; Standing  -     Verify NPO Status; Standing  -     SCD (sequential compression device)- to be placed on patient in Pre-op; Standing  -     Clip operative site; Standing  -     Obtain informed consent (if not collected inpatient or PAT); Standing  -     Notify Physician - Standard; Standing  -     NPO After Midnight  -     mupirocin (BACTROBAN) 2 % ointment; Apply into the nostril(s) as directed by provider 2 (Two) Times a Day.  -     chlorhexidine (HIBICLENS) 4 % external liquid;  Shower with hibiclens solution as directed for 5 days prior to surgery      Please see my note dated 1/28/2019 for additional details.      Return for For surgery as planned.      Medical Decision Making  Management Options : major surgery with risk factors        Anil Graham MD  02/04/19  4:49 PM

## 2019-02-01 ENCOUNTER — TELEPHONE (OUTPATIENT)
Dept: ORTHOPEDIC SURGERY | Facility: CLINIC | Age: 54
End: 2019-02-01

## 2019-02-01 NOTE — TELEPHONE ENCOUNTER
Just need you to add your HPI to the patients note from your visit for his hip when we scheduled him for surgery. Just let me know when this gets finished so I can tell Gisella. Thank you !    Minnie

## 2019-03-01 ENCOUNTER — TELEPHONE (OUTPATIENT)
Dept: ORTHOPEDIC SURGERY | Facility: CLINIC | Age: 54
End: 2019-03-01

## 2019-03-01 NOTE — TELEPHONE ENCOUNTER
----- Message from Anil Graham MD sent at 3/1/2019  2:43 PM EST -----  Should be fine - but no closer than a week before surgery.    ----- Message -----  From: Marielena Low  Sent: 3/1/2019   9:12 AM  To: Anil Graham MD    PATIENT IS SCHD FOR RT ELVIRA ON 3/19/19. HE IS HAVING SOME SHOULDER PAIN. DR HURT WANTS TO KNOW IF OK TO INJECT SHOULDER PRIOR TO HIS HIP SX.    TXS

## 2019-03-07 ENCOUNTER — APPOINTMENT (OUTPATIENT)
Dept: PREADMISSION TESTING | Facility: HOSPITAL | Age: 54
End: 2019-03-07

## 2019-03-07 ENCOUNTER — OFFICE VISIT (OUTPATIENT)
Dept: ORTHOPEDIC SURGERY | Facility: CLINIC | Age: 54
End: 2019-03-07

## 2019-03-07 VITALS — OXYGEN SATURATION: 98 % | HEART RATE: 79 BPM | HEIGHT: 70 IN | BODY MASS INDEX: 34.72 KG/M2 | WEIGHT: 242.51 LBS

## 2019-03-07 VITALS — BODY MASS INDEX: 36.11 KG/M2 | WEIGHT: 252.21 LBS | HEIGHT: 70 IN

## 2019-03-07 DIAGNOSIS — M25.511 RIGHT SHOULDER PAIN, UNSPECIFIED CHRONICITY: Primary | ICD-10-CM

## 2019-03-07 DIAGNOSIS — M16.11 PRIMARY OSTEOARTHRITIS OF RIGHT HIP: ICD-10-CM

## 2019-03-07 DIAGNOSIS — IMO0002 DISORDER OF ROTATOR CUFF SYNDROME OF RIGHT SHOULDER AND ALLIED DISORDER: ICD-10-CM

## 2019-03-07 DIAGNOSIS — M19.011 ARTHRITIS OF RIGHT ACROMIOCLAVICULAR JOINT: ICD-10-CM

## 2019-03-07 LAB
APTT PPP: 29.7 SECONDS (ref 24–37)
BACTERIA UR QL AUTO: NORMAL /HPF
BILIRUB UR QL STRIP: NEGATIVE
CLARITY UR: CLEAR
COLOR UR: YELLOW
CRP SERPL-MCNC: 0.07 MG/DL (ref 0–1)
ERYTHROCYTE [SEDIMENTATION RATE] IN BLOOD: 8 MM/HR (ref 0–20)
GLUCOSE UR STRIP-MCNC: NEGATIVE MG/DL
HBA1C MFR BLD: 5.5 % (ref 4.8–5.6)
HGB UR QL STRIP.AUTO: NEGATIVE
HYALINE CASTS UR QL AUTO: NORMAL /LPF
INR PPP: 0.99 (ref 0.85–1.16)
KETONES UR QL STRIP: ABNORMAL
LEUKOCYTE ESTERASE UR QL STRIP.AUTO: NEGATIVE
NITRITE UR QL STRIP: NEGATIVE
PH UR STRIP.AUTO: <=5 [PH] (ref 5–8)
PROT UR QL STRIP: NEGATIVE
PROTHROMBIN TIME: 12.6 SECONDS (ref 11.2–14.3)
RBC # UR: NORMAL /HPF
REF LAB TEST METHOD: NORMAL
SP GR UR STRIP: 1.03 (ref 1–1.03)
SQUAMOUS #/AREA URNS HPF: NORMAL /HPF
UROBILINOGEN UR QL STRIP: ABNORMAL
WBC UR QL AUTO: NORMAL /HPF

## 2019-03-07 PROCEDURE — 86140 C-REACTIVE PROTEIN: CPT | Performed by: ORTHOPAEDIC SURGERY

## 2019-03-07 PROCEDURE — 36415 COLL VENOUS BLD VENIPUNCTURE: CPT

## 2019-03-07 PROCEDURE — 85730 THROMBOPLASTIN TIME PARTIAL: CPT | Performed by: ORTHOPAEDIC SURGERY

## 2019-03-07 PROCEDURE — 85652 RBC SED RATE AUTOMATED: CPT | Performed by: ORTHOPAEDIC SURGERY

## 2019-03-07 PROCEDURE — 93010 ELECTROCARDIOGRAM REPORT: CPT | Performed by: INTERNAL MEDICINE

## 2019-03-07 PROCEDURE — 93005 ELECTROCARDIOGRAM TRACING: CPT

## 2019-03-07 PROCEDURE — 20610 DRAIN/INJ JOINT/BURSA W/O US: CPT | Performed by: PHYSICIAN ASSISTANT

## 2019-03-07 PROCEDURE — 99214 OFFICE O/P EST MOD 30 MIN: CPT | Performed by: PHYSICIAN ASSISTANT

## 2019-03-07 PROCEDURE — 83036 HEMOGLOBIN GLYCOSYLATED A1C: CPT | Performed by: ORTHOPAEDIC SURGERY

## 2019-03-07 PROCEDURE — 85610 PROTHROMBIN TIME: CPT | Performed by: ORTHOPAEDIC SURGERY

## 2019-03-07 PROCEDURE — 81001 URINALYSIS AUTO W/SCOPE: CPT | Performed by: ORTHOPAEDIC SURGERY

## 2019-03-07 RX ORDER — CETIRIZINE HYDROCHLORIDE 10 MG/1
10 TABLET ORAL DAILY
COMMUNITY
End: 2019-04-08

## 2019-03-07 RX ORDER — MELATONIN 10 MG
2 CAPSULE ORAL NIGHTLY
COMMUNITY

## 2019-03-07 RX ORDER — LORATADINE 10 MG/1
10 TABLET ORAL DAILY
COMMUNITY
End: 2019-03-20 | Stop reason: HOSPADM

## 2019-03-07 RX ORDER — DICLOFENAC SODIUM 75 MG/1
75 TABLET, DELAYED RELEASE ORAL DAILY
Refills: 11 | COMMUNITY
Start: 2019-02-27

## 2019-03-07 RX ORDER — SODIUM PHOSPHATE,MONO-DIBASIC 19G-7G/118
1 ENEMA (ML) RECTAL 2 TIMES DAILY WITH MEALS
COMMUNITY
End: 2019-03-20 | Stop reason: HOSPADM

## 2019-03-07 RX ORDER — LIDOCAINE HYDROCHLORIDE 10 MG/ML
4 INJECTION, SOLUTION INFILTRATION; PERINEURAL
Status: COMPLETED | OUTPATIENT
Start: 2019-03-07 | End: 2019-03-07

## 2019-03-07 RX ORDER — OMEPRAZOLE 20 MG/1
20 CAPSULE, DELAYED RELEASE ORAL DAILY
COMMUNITY

## 2019-03-07 RX ORDER — TRIAMCINOLONE ACETONIDE 40 MG/ML
40 INJECTION, SUSPENSION INTRA-ARTICULAR; INTRAMUSCULAR
Status: COMPLETED | OUTPATIENT
Start: 2019-03-07 | End: 2019-03-07

## 2019-03-07 RX ADMIN — LIDOCAINE HYDROCHLORIDE 4 ML: 10 INJECTION, SOLUTION INFILTRATION; PERINEURAL at 10:53

## 2019-03-07 RX ADMIN — TRIAMCINOLONE ACETONIDE 40 MG: 40 INJECTION, SUSPENSION INTRA-ARTICULAR; INTRAMUSCULAR at 10:53

## 2019-03-07 ASSESSMENT — HOOS JR
HOOS JR SCORE: 29.009
HOOS JR SCORE: 19

## 2019-03-07 NOTE — PROGRESS NOTES
"    [facility] [place] [facility] Note    Subjective     Chief Complaint   Patient presents with   • Right Shoulder - Pain        HPI      Patricio Iverson is a 53 y.o. male.  Right-hand-dominant.  Patient presents to orthopedic clinic for evaluation of his right shoulder.  States he has had cuff and labral surgeries in 2010 and 2012.  Approximately 6-8 weeks ago he began having pain again to the right shoulder.  Pain scale maximum 3-4/10.  Severity the pain mild to moderate.  Quality the pain constant dull and aching.  Associated symptoms popping and stiffness.  Patient states she has pain to the superior and lateral aspect of the shoulder.  Pain with lifting, reaching, overhead movement.  Patient denies any radiculopathy or paresthesias.    No reported fever, chills, night sweats or other constitutional symptoms.      Patient has previously been treated for carpal tunnel syndrome/release by Dr. Mosqueda 12/2018.  He has also been treated for right hip pain by Dr. Graham and is due to undergo right ELVIRA 3/19/2019.      Past Medical History:   Diagnosis Date   • Abdominal hernia    • Arthritis    • GERD (gastroesophageal reflux disease)    • Headache    • Hyperlipidemia    • Hypertriglyceridemia    • Migraine    • Seasonal allergies    • Sleep apnea with use of continuous positive airway pressure (CPAP)     compliant with machine    • SOBOE (shortness of breath on exertion)    • Wears glasses    • Wears partial dentures     \"dental implant\"  tops and most bottom - crowns and implants       Past Surgical History:   Procedure Laterality Date   • COLONOSCOPY     • DENTAL PROCEDURE  08/2018    Dental Implants   • ENDOSCOPY     • EPIDURAL      injection in shoulder    • HERNIA REPAIR      umbilical hernia on left side    • KNEE SURGERY Right 2010    scope   • SHOULDER SURGERY Right 2010    labrium and rotator cuff- couple times    • TONSILLECTOMY     • WISDOM TOOTH EXTRACTION      all 4 removed    • WRIST SURGERY " Right       Family History   Problem Relation Age of Onset   • No Known Problems Mother    • Cancer Father      Social History     Socioeconomic History   • Marital status: Single     Spouse name: Not on file   • Number of children: Not on file   • Years of education: Not on file   • Highest education level: Not on file   Social Needs   • Financial resource strain: Not on file   • Food insecurity - worry: Not on file   • Food insecurity - inability: Not on file   • Transportation needs - medical: Not on file   • Transportation needs - non-medical: Not on file   Occupational History   • Not on file   Tobacco Use   • Smoking status: Never Smoker   • Smokeless tobacco: Never Used   Substance and Sexual Activity   • Alcohol use: Yes     Alcohol/week: 8.4 oz     Types: 7 Cans of beer, 7 Shots of liquor per week   • Drug use: No   • Sexual activity: Defer   Other Topics Concern   • Not on file   Social History Narrative   • Not on file      Current Outpatient Medications on File Prior to Visit   Medication Sig Dispense Refill   • ALPRAZolam (XANAX) 0.5 MG tablet Take 0.5 mg by mouth 3 (Three) Times a Day As Needed.  2   • amLODIPine (NORVASC) 5 MG tablet Take 5 mg by mouth Daily.  3   • atorvastatin (LIPITOR) 40 MG tablet Take 40 mg by mouth Daily.  3   • cetirizine (zyrTEC) 10 MG tablet Take 10 mg by mouth Daily.     • chlorhexidine (HIBICLENS) 4 % external liquid  Shower with hibiclens solution as directed for 5 days prior to surgery 236 mL 0   • desloratadine (CLARINEX) 5 MG tablet TAKE ONE TABLET BY MOUTH EVERY DAY AT BEDTIME AS DIRECTED  6   • diclofenac (VOLTAREN) 75 MG EC tablet Take 75 mg by mouth Daily.  11   • fenofibrate 160 MG tablet Take 160 mg by mouth Every Night.  3   • gabapentin (NEURONTIN) 800 MG tablet Take 400 mg by mouth 2 (Two) Times a Day.  4   • Ginkgo Biloba (GINKOBA PO) Take 1 capsule by mouth Daily.     • glucosamine-chondroitin 500-400 MG capsule capsule Take 1 capsule by mouth 2 (Two) Times a  "Day With Meals.     • HYDROcodone-acetaminophen (NORCO) 7.5-325 MG per tablet Take 1 tablet by mouth 3 (Three) Times a Day As Needed for Moderate Pain .  0   • ipratropium (ATROVENT) 0.06 % nasal spray 1 spray into the nostril(s) as directed by provider 2 (Two) Times a Day As Needed for Rhinitis.     • lisinopril (PRINIVIL,ZESTRIL) 20 MG tablet Take 20 mg by mouth Daily.  3   • loratadine (CLARITIN) 10 MG tablet Take 10 mg by mouth Daily.     • Melatonin 10 MG capsule Take 2 capsules by mouth Every Night.     • montelukast (SINGULAIR) 10 MG tablet Take 10 mg by mouth Every Night.  5   • Multiple Vitamins-Minerals (MULTIVITAMIN ADULT PO) Take 1 capsule by mouth Daily.     • mupirocin (BACTROBAN) 2 % ointment Apply into the nostril(s) as directed by provider 2 (Two) Times a Day. 22 g 0   • Omega-3 Fatty Acids (FISH OIL) 1200 MG capsule delayed-release Take 1 capsule by mouth 2 (Two) Times a Day. 1290     • omeprazole (priLOSEC) 20 MG capsule Take 20 mg by mouth Daily.     • sildenafil (REVATIO) 20 MG tablet TAKE UP TO 3 TABLETS BY MOUTH EVERY DAY AS NEEDED  5   • Testosterone Cypionate (DEPOTESTOTERONE CYPIONATE) 200 MG/ML injection INJECT 1 ML IM EVERY 14 DAYS-  1   • [DISCONTINUED] B-D 3CC LUER-KIET SYR 23GX1\" 23G X 1\" 3 ML misc See Admin Instructions.  5     No current facility-administered medications on file prior to visit.       Allergies   Allergen Reactions   • Keflex [Cephalexin] Hives        The following portions of the patient's history were reviewed and updated as appropriate: allergies, current medications, past family history, past medical history, past social history, past surgical history and problem list.    Review of Systems   Constitutional: Positive for activity change.   HENT: Positive for sinus pain.    Eyes: Negative.    Respiratory: Negative.    Cardiovascular: Negative.    Gastrointestinal: Negative.    Endocrine: Negative.    Genitourinary: Negative.    Musculoskeletal: Positive for " "arthralgias and joint swelling.   Skin: Negative.    Allergic/Immunologic: Positive for environmental allergies.   Neurological: Negative.    Hematological: Negative.    Psychiatric/Behavioral: Negative.         Objective      Physical Exam  Pulse 79   Ht 177.8 cm (70\")   Wt 110 kg (242 lb 8.1 oz)   SpO2 98%   BMI 34.80 kg/m²     Body mass index is 34.8 kg/m².      GENERAL APPEARANCE: awake, alert & oriented x 3, in no acute distress and well developed, well nourished  PSYCH: normal mood and affect  LUNGS:  breathing nonlabored, no wheezing  EYES: sclera anicteric, pupils equal  CARDIOVASCULAR: palpable pulses dorsalis pedis, palpable posterior tibial bilaterally. Capillary refill less than 2 seconds  INTEGUMENTARY: skin intact, no clubbing, cyanosis  NEUROLOGIC:  Normal Sensation and reflexes           Ortho Exam  Musculoskeletal   Upper Extremity   Right Shoulder     Inspection and Palpation:     Tenderness -positive tenderness noted lateral shoulder over ACJ.    Crepitus - none    Sensation is normal    Examination reveals no ecchymosis.        Strength and Tone:    Supraspinatus - 4/5    External Rotators-4/5    Infraspinatus - 4/5    Subscapularis - 5/5    Deltoid - 5/5     Range of Motion   Right Shoulder:    Internal Rotation: ROM - 50    External Rotation: AROM - 75 degrees    Elevation through flexion: AROM - 170 degrees      Instability   Right shoulder    Sulcus sign negative    Apprehension test negative    Roxanne relocation test negative    Jerk test negative     Impingement   Right shoulder    Dave-Milton impingement test positive    Neer impingement test positive     Functional Testing   Right shoulder    AC crossover adduction test mild discomfort    Abdominal compression test negative    Lift-off sign negative    Speed's test negative    Quezada's test negative    Hornblower's sign negative       Imaging/Studies  Imaging Results (last 7 days)     Procedure Component Value Units Date/Time    XR " Shoulder 2+ View Right [[id number]] Resulted:  03/07/19 1103     Updated:  03/07/19 1104    Narrative:       Right Shoulder X-Ray    Indication: Pain    Study:  AP, scapular Y, and axillary lateral views     Comparison: None    Findings:  No acute fractures are visualized.  No bony lesions are visualized.  Normal soft tissue appearance  AC joint: Moderate to severe joint space narrowing  Glenohumeral joint: Mild joint space narrowing  Acromion morphology:  Type 2  Changes at greater tuberosity consistent with either prior surgery or   chronic cuff pathology.      Impression: See above for full discussion        Ordered plain film imaging of the right shoulder.  Images were reviewed by Dr. Mosqueda.  No acute bony abnormality, fracture or dislocation.  Mild to severe ACJ space narrowing.  Positive type II acromion.  Also changes noted to the greater tuberosity consistent with prior surgery or chronic rotator cuff pathology.  See chart for official report.      Assessment/Plan        ICD-10-CM ICD-9-CM   1. Right shoulder pain, unspecified chronicity M25.511 719.41   2. Disorder of rotator cuff syndrome of right shoulder and allied disorder M75.101 726.19   3. Arthritis of right acromioclavicular joint M19.011 716.91       Orders Placed This Encounter   Procedures   • Large Joint Arthrocentesis: R subacromial bursa   • XR Shoulder 2+ View Right        -Right shoulder pain secondary to rotator cuff pathology and AC joint arthritis.  -Per Dr. Graham as long as the patient is greater than 1 week out from date of surgery (3/19/2019) he can receive a corticosteroid injection.  Offered and accepted a subacromial corticosteroid injection today to help further isolate true pain generator of the right shoulder.  Injection was given today.  -Encouraged gentle range of motion of the right shoulder.  -Patient to continue with his preoperative ELVIRA course as directed by Dr. Graham.  -Follow-up regarding his right shoulder in 4 weeks,  if able, to discuss how well subacromial injection worked and if he will require additional injection to the ACJ.  -Questioning concerns answered.    After discussing the risks of injection, the patient gave consent to proceed.  His right shoulder subacromial space was confirmed as the correct site to be injected with a timeout.  It was then prepped using Hibiclens and injected with a mixture of 4 cc of 1% plain lidocaine and 1 cc of Kenalog (40 mg per mL) without any resistance through the posterior lateral approach, patient in seated position.  Area was cleaned, hemostasis was achieved and a Band-Aid was applied over the injection site.  The patient tolerated procedure well.  I instructed the patient on signs and symptoms of infection.  They should report to the emergency department or return to clinic if any of these develop, for further evaluation and treatment.  Recommended modifying activity for the next 48 hours to include rest, ice, elevation and oral pain medication as needed.      Medical Decision Making  Management Options : prescription/IM medicine  Data/Risk: radiology tests       Joyce Lentz PA-C  03/07/19  12:00 PM         EMR Dragon/Transcription disclaimer:  Much of this encounter note is an electronic transcription of spoken language to printed text. Electronic transcription of spoken language may permit erroneous, or at times, nonsensical words or phrases to be inadvertently transcribed. Although I have reviewed the note for such errors, some may still exist.

## 2019-03-07 NOTE — PROGRESS NOTES
Procedure   Large Joint Arthrocentesis: R subacromial bursa  Date/Time: 3/7/2019 10:53 AM  Consent given by: patient  Site marked: site marked  Timeout: Immediately prior to procedure a time out was called to verify the correct patient, procedure, equipment, support staff and site/side marked as required   Supporting Documentation  Indications: pain   Procedure Details  Location: shoulder - R subacromial bursa  Preparation: Patient was prepped and draped in the usual sterile fashion  Needle size: 22 G  Medications administered: 4 mL lidocaine 1 %; 40 mg triamcinolone acetonide 40 MG/ML  Patient tolerance: patient tolerated the procedure well with no immediate complications

## 2019-03-07 NOTE — DISCHARGE INSTRUCTIONS
The following information and instructions were given:    Do not eat, drink, smoke or chew gum after midnight the night before surgery. This also includes no mints.  Take all routine, prescribed medications including heart and blood pressure medicines with a sip of water unless otherwise instructed by your physician.   Do NOT take diabetic medication unless instructed by your physician.    If you were instructed to drink 20 ounces (or until full) of Gatorade or G2 (if diabetic) the morning of surgery, the Gatorade or G2 must be completed 1 hour before arrival time on the day of surgery .  No RED Gatorade or G2.      DO NOT shave for two days before your procedure.  Do not wear makeup.      DO NOT wear fingernail polish (gel/regular) and/or acrylic/artificial nails on the day of surgery.   If you have had a recent manicure and would rather not remove polish or artificial nails, then the minimum requirement is that the polish/artificial nails must be removed from the middle finger on each hand.      If you are having surgery/procedure on an upper extremity, then fingernail polish/artificial fingernails must be removed for surgery.  NO EXCEPTIONS.      If you are having surgery/procedure on a lower extremity, then toenail polish on both extremities must be removed for surgery.  NO EXCEPTIONS.    Remove all jewelry (advise to go to jeweler if unable to remove).  Jewelry, especially rings, can no longer be taped for surgery.    Leave anything you consider valuable at home.    Leave your suitcase in the car until after your surgery.    Bring the following with you the day of your procedure (when applicable)       -picture ID and insurance cards       -Co-pay/deductible required by insurance       -Medications in the original bottles (not a list) including all over-the-counter medications if not brought to PAT       -Copy of advance directive, living will or power of  documents if not brought to PAT       -CPAP or  BIPAP mask and tubing (do not bring machine)       -Skin prep instruction(s) sheet       -PAT Pass    Education booklet, brochure, handout or binder related to procedure given to patient.    When applicable, an ERAS booklet was given to patient.    Pain Control After Surgery handout given to patient.    Respirex use (handout given to patient) and pneumonia prevention education provided.    Signs and Symptoms of infection discussed.    DVT Prevention education given.  Stressing the importance of ambulation.    Please apply Chlorhexadine wipes to surgical area (if instructed) the night before procedure and the AM of procedure and document date/time of applications on skin prep instruction sheet.

## 2019-03-18 ENCOUNTER — ANESTHESIA EVENT (OUTPATIENT)
Dept: PERIOP | Facility: HOSPITAL | Age: 54
End: 2019-03-18

## 2019-03-18 RX ORDER — FAMOTIDINE 10 MG/ML
20 INJECTION, SOLUTION INTRAVENOUS ONCE
Status: CANCELLED | OUTPATIENT
Start: 2019-03-18 | End: 2019-03-18

## 2019-03-19 ENCOUNTER — ANESTHESIA (OUTPATIENT)
Dept: PERIOP | Facility: HOSPITAL | Age: 54
End: 2019-03-19

## 2019-03-19 ENCOUNTER — HOSPITAL ENCOUNTER (INPATIENT)
Facility: HOSPITAL | Age: 54
LOS: 1 days | Discharge: HOME | End: 2019-03-20
Attending: ORTHOPAEDIC SURGERY | Admitting: ORTHOPAEDIC SURGERY

## 2019-03-19 ENCOUNTER — APPOINTMENT (OUTPATIENT)
Dept: GENERAL RADIOLOGY | Facility: HOSPITAL | Age: 54
End: 2019-03-19

## 2019-03-19 DIAGNOSIS — Z96.641 STATUS POST TOTAL HIP REPLACEMENT, RIGHT: ICD-10-CM

## 2019-03-19 DIAGNOSIS — Z74.09 IMPAIRED FUNCTIONAL MOBILITY, BALANCE, GAIT, AND ENDURANCE: Primary | ICD-10-CM

## 2019-03-19 DIAGNOSIS — Z74.09 IMPAIRED MOBILITY AND ADLS: ICD-10-CM

## 2019-03-19 DIAGNOSIS — M16.11 PRIMARY OSTEOARTHRITIS OF RIGHT HIP: ICD-10-CM

## 2019-03-19 DIAGNOSIS — Z78.9 IMPAIRED MOBILITY AND ADLS: ICD-10-CM

## 2019-03-19 PROBLEM — Z99.89 OSA ON CPAP: Status: ACTIVE | Noted: 2019-03-19

## 2019-03-19 PROBLEM — I10 HTN (HYPERTENSION): Status: ACTIVE | Noted: 2019-03-19

## 2019-03-19 PROBLEM — G47.33 OSA ON CPAP: Status: ACTIVE | Noted: 2019-03-19

## 2019-03-19 PROBLEM — G89.29 CHRONIC PAIN: Status: ACTIVE | Noted: 2019-03-19

## 2019-03-19 PROBLEM — F11.90 CHRONIC NARCOTIC USE: Status: ACTIVE | Noted: 2019-03-19

## 2019-03-19 PROBLEM — E78.5 HLD (HYPERLIPIDEMIA): Status: ACTIVE | Noted: 2019-03-19

## 2019-03-19 LAB — POTASSIUM BLDA-SCNC: 4.32 MMOL/L (ref 3.5–5.3)

## 2019-03-19 PROCEDURE — 25010000002 VANCOMYCIN 10 G RECONSTITUTED SOLUTION: Performed by: ORTHOPAEDIC SURGERY

## 2019-03-19 PROCEDURE — C1776 JOINT DEVICE (IMPLANTABLE): HCPCS | Performed by: ORTHOPAEDIC SURGERY

## 2019-03-19 PROCEDURE — 73502 X-RAY EXAM HIP UNI 2-3 VIEWS: CPT

## 2019-03-19 PROCEDURE — 25010000002 MORPHINE SULFATE (PF) 2 MG/ML SOLUTION: Performed by: ORTHOPAEDIC SURGERY

## 2019-03-19 PROCEDURE — 0SR904Z REPLACEMENT OF RIGHT HIP JOINT WITH CERAMIC ON POLYETHYLENE SYNTHETIC SUBSTITUTE, OPEN APPROACH: ICD-10-PCS | Performed by: ORTHOPAEDIC SURGERY

## 2019-03-19 PROCEDURE — 25010000002 FENTANYL CITRATE (PF) 100 MCG/2ML SOLUTION: Performed by: NURSE ANESTHETIST, CERTIFIED REGISTERED

## 2019-03-19 PROCEDURE — 25010000002 MIDAZOLAM PER 1 MG: Performed by: ANESTHESIOLOGY

## 2019-03-19 PROCEDURE — 27130 TOTAL HIP ARTHROPLASTY: CPT | Performed by: ORTHOPAEDIC SURGERY

## 2019-03-19 PROCEDURE — 25010000002 HYDROMORPHONE PER 4 MG: Performed by: ORTHOPAEDIC SURGERY

## 2019-03-19 PROCEDURE — 25010000002 PROPOFOL 1000 MG/ML EMULSION: Performed by: NURSE ANESTHETIST, CERTIFIED REGISTERED

## 2019-03-19 PROCEDURE — 25010000002 ONDANSETRON PER 1 MG: Performed by: NURSE ANESTHETIST, CERTIFIED REGISTERED

## 2019-03-19 PROCEDURE — 25010000002 ROPIVACAINE PER 1 MG: Performed by: ORTHOPAEDIC SURGERY

## 2019-03-19 PROCEDURE — 25010000002 DEXAMETHASONE PER 1 MG: Performed by: NURSE ANESTHETIST, CERTIFIED REGISTERED

## 2019-03-19 PROCEDURE — 84132 ASSAY OF SERUM POTASSIUM: CPT | Performed by: ANESTHESIOLOGY

## 2019-03-19 DEVICE — SYNERGY POROUS PLUS HYDROXYAPATITE                                    HIGH OFFSET SIZE 16
Type: IMPLANTABLE DEVICE | Site: HIP | Status: FUNCTIONAL
Brand: SYNERGY

## 2019-03-19 DEVICE — R3 US DELTA HEAD 36 +0
Type: IMPLANTABLE DEVICE | Site: HIP | Status: FUNCTIONAL
Brand: BIOLOX DELTA

## 2019-03-19 DEVICE — REFLECTION SPHERICAL HEAD SCREW 30MM
Type: IMPLANTABLE DEVICE | Site: HIP | Status: FUNCTIONAL
Brand: REFLECTION

## 2019-03-19 DEVICE — R3 0 DEGREE +4 XLPE ACETABULAR                                    LINER 36MM INNER DIAMETER X OUTER                                    DIAMETER 52MM
Type: IMPLANTABLE DEVICE | Site: HIP | Status: FUNCTIONAL
Brand: R3

## 2019-03-19 DEVICE — IMPLANTABLE DEVICE: Type: IMPLANTABLE DEVICE | Site: HIP | Status: FUNCTIONAL

## 2019-03-19 DEVICE — R3 3 HOLE ACETABULAR SHELL 52MM
Type: IMPLANTABLE DEVICE | Site: HIP | Status: FUNCTIONAL
Brand: R3 ACETABULAR

## 2019-03-19 RX ORDER — MIDAZOLAM HYDROCHLORIDE 1 MG/ML
2 INJECTION INTRAMUSCULAR; INTRAVENOUS ONCE
Status: COMPLETED | OUTPATIENT
Start: 2019-03-19 | End: 2019-03-19

## 2019-03-19 RX ORDER — ACETAMINOPHEN 650 MG/1
650 SUPPOSITORY RECTAL EVERY 4 HOURS PRN
Status: DISCONTINUED | OUTPATIENT
Start: 2019-03-19 | End: 2019-03-20 | Stop reason: HOSPADM

## 2019-03-19 RX ORDER — SODIUM CHLORIDE 0.9 % (FLUSH) 0.9 %
3-10 SYRINGE (ML) INJECTION AS NEEDED
Status: DISCONTINUED | OUTPATIENT
Start: 2019-03-19 | End: 2019-03-19 | Stop reason: HOSPADM

## 2019-03-19 RX ORDER — PROMETHAZINE HYDROCHLORIDE 25 MG/1
25 SUPPOSITORY RECTAL ONCE AS NEEDED
Status: DISCONTINUED | OUTPATIENT
Start: 2019-03-19 | End: 2019-03-19 | Stop reason: HOSPADM

## 2019-03-19 RX ORDER — SODIUM CHLORIDE 0.9 % (FLUSH) 0.9 %
3 SYRINGE (ML) INJECTION EVERY 12 HOURS SCHEDULED
Status: DISCONTINUED | OUTPATIENT
Start: 2019-03-19 | End: 2019-03-20 | Stop reason: HOSPADM

## 2019-03-19 RX ORDER — ATROPINE SULFATE 1 MG/ML
0.5 INJECTION, SOLUTION INTRAMUSCULAR; INTRAVENOUS; SUBCUTANEOUS ONCE AS NEEDED
Status: DISCONTINUED | OUTPATIENT
Start: 2019-03-19 | End: 2019-03-19 | Stop reason: HOSPADM

## 2019-03-19 RX ORDER — ASPIRIN 325 MG
325 TABLET, DELAYED RELEASE (ENTERIC COATED) ORAL DAILY
Status: DISCONTINUED | OUTPATIENT
Start: 2019-03-20 | End: 2019-03-20 | Stop reason: HOSPADM

## 2019-03-19 RX ORDER — LABETALOL HYDROCHLORIDE 5 MG/ML
10 INJECTION, SOLUTION INTRAVENOUS EVERY 4 HOURS PRN
Status: DISCONTINUED | OUTPATIENT
Start: 2019-03-19 | End: 2019-03-20 | Stop reason: HOSPADM

## 2019-03-19 RX ORDER — MELOXICAM 7.5 MG/1
15 TABLET ORAL ONCE
Status: COMPLETED | OUTPATIENT
Start: 2019-03-19 | End: 2019-03-19

## 2019-03-19 RX ORDER — ROPIVACAINE HYDROCHLORIDE 5 MG/ML
INJECTION, SOLUTION EPIDURAL; INFILTRATION; PERINEURAL AS NEEDED
Status: DISCONTINUED | OUTPATIENT
Start: 2019-03-19 | End: 2019-03-19 | Stop reason: HOSPADM

## 2019-03-19 RX ORDER — DEXAMETHASONE SODIUM PHOSPHATE 4 MG/ML
INJECTION, SOLUTION INTRA-ARTICULAR; INTRALESIONAL; INTRAMUSCULAR; INTRAVENOUS; SOFT TISSUE AS NEEDED
Status: DISCONTINUED | OUTPATIENT
Start: 2019-03-19 | End: 2019-03-19 | Stop reason: SURG

## 2019-03-19 RX ORDER — ONDANSETRON 2 MG/ML
INJECTION INTRAMUSCULAR; INTRAVENOUS AS NEEDED
Status: DISCONTINUED | OUTPATIENT
Start: 2019-03-19 | End: 2019-03-19 | Stop reason: SURG

## 2019-03-19 RX ORDER — PROMETHAZINE HYDROCHLORIDE 25 MG/ML
6.25 INJECTION, SOLUTION INTRAMUSCULAR; INTRAVENOUS ONCE AS NEEDED
Status: DISCONTINUED | OUTPATIENT
Start: 2019-03-19 | End: 2019-03-19 | Stop reason: HOSPADM

## 2019-03-19 RX ORDER — BUPIVACAINE HYDROCHLORIDE 5 MG/ML
INJECTION, SOLUTION PERINEURAL
Status: COMPLETED | OUTPATIENT
Start: 2019-03-19 | End: 2019-03-19

## 2019-03-19 RX ORDER — FAMOTIDINE 20 MG/1
20 TABLET, FILM COATED ORAL ONCE
Status: COMPLETED | OUTPATIENT
Start: 2019-03-19 | End: 2019-03-19

## 2019-03-19 RX ORDER — LIDOCAINE HYDROCHLORIDE 10 MG/ML
0.5 INJECTION, SOLUTION EPIDURAL; INFILTRATION; INTRACAUDAL; PERINEURAL ONCE AS NEEDED
Status: COMPLETED | OUTPATIENT
Start: 2019-03-19 | End: 2019-03-19

## 2019-03-19 RX ORDER — ACETAMINOPHEN 500 MG
1000 TABLET ORAL ONCE
Status: COMPLETED | OUTPATIENT
Start: 2019-03-19 | End: 2019-03-19

## 2019-03-19 RX ORDER — BISACODYL 5 MG/1
10 TABLET, DELAYED RELEASE ORAL DAILY PRN
Status: DISCONTINUED | OUTPATIENT
Start: 2019-03-19 | End: 2019-03-20 | Stop reason: HOSPADM

## 2019-03-19 RX ORDER — ONDANSETRON 4 MG/1
4 TABLET, FILM COATED ORAL EVERY 6 HOURS PRN
Status: DISCONTINUED | OUTPATIENT
Start: 2019-03-19 | End: 2019-03-20 | Stop reason: HOSPADM

## 2019-03-19 RX ORDER — MAGNESIUM HYDROXIDE 1200 MG/15ML
LIQUID ORAL AS NEEDED
Status: DISCONTINUED | OUTPATIENT
Start: 2019-03-19 | End: 2019-03-19 | Stop reason: HOSPADM

## 2019-03-19 RX ORDER — HYDROCODONE BITARTRATE AND ACETAMINOPHEN 5; 325 MG/1; MG/1
1 TABLET ORAL EVERY 4 HOURS PRN
Status: DISCONTINUED | OUTPATIENT
Start: 2019-03-19 | End: 2019-03-19

## 2019-03-19 RX ORDER — MORPHINE SULFATE 2 MG/ML
4 INJECTION, SOLUTION INTRAMUSCULAR; INTRAVENOUS
Status: DISCONTINUED | OUTPATIENT
Start: 2019-03-19 | End: 2019-03-20 | Stop reason: HOSPADM

## 2019-03-19 RX ORDER — ONDANSETRON 2 MG/ML
4 INJECTION INTRAMUSCULAR; INTRAVENOUS EVERY 6 HOURS PRN
Status: DISCONTINUED | OUTPATIENT
Start: 2019-03-19 | End: 2019-03-20 | Stop reason: HOSPADM

## 2019-03-19 RX ORDER — ALPRAZOLAM 0.5 MG/1
0.5 TABLET ORAL 3 TIMES DAILY PRN
Status: DISCONTINUED | OUTPATIENT
Start: 2019-03-19 | End: 2019-03-20 | Stop reason: HOSPADM

## 2019-03-19 RX ORDER — ATORVASTATIN CALCIUM 40 MG/1
40 TABLET, FILM COATED ORAL DAILY
Status: DISCONTINUED | OUTPATIENT
Start: 2019-03-19 | End: 2019-03-20 | Stop reason: HOSPADM

## 2019-03-19 RX ORDER — PROMETHAZINE HYDROCHLORIDE 25 MG/1
25 TABLET ORAL ONCE AS NEEDED
Status: DISCONTINUED | OUTPATIENT
Start: 2019-03-19 | End: 2019-03-19 | Stop reason: HOSPADM

## 2019-03-19 RX ORDER — SODIUM CHLORIDE 0.9 % (FLUSH) 0.9 %
1-10 SYRINGE (ML) INJECTION AS NEEDED
Status: DISCONTINUED | OUTPATIENT
Start: 2019-03-19 | End: 2019-03-20 | Stop reason: HOSPADM

## 2019-03-19 RX ORDER — MELOXICAM 7.5 MG/1
15 TABLET ORAL DAILY
Status: DISCONTINUED | OUTPATIENT
Start: 2019-03-20 | End: 2019-03-20 | Stop reason: HOSPADM

## 2019-03-19 RX ORDER — EPHEDRINE SULFATE 50 MG/ML
5 INJECTION, SOLUTION INTRAVENOUS ONCE AS NEEDED
Status: DISCONTINUED | OUTPATIENT
Start: 2019-03-19 | End: 2019-03-19 | Stop reason: HOSPADM

## 2019-03-19 RX ORDER — SODIUM CHLORIDE, SODIUM LACTATE, POTASSIUM CHLORIDE, CALCIUM CHLORIDE 600; 310; 30; 20 MG/100ML; MG/100ML; MG/100ML; MG/100ML
9 INJECTION, SOLUTION INTRAVENOUS CONTINUOUS
Status: DISCONTINUED | OUTPATIENT
Start: 2019-03-19 | End: 2019-03-19

## 2019-03-19 RX ORDER — HYDROCODONE BITARTRATE AND ACETAMINOPHEN 10; 325 MG/1; MG/1
1 TABLET ORAL EVERY 4 HOURS PRN
Status: COMPLETED | OUTPATIENT
Start: 2019-03-19 | End: 2019-03-19

## 2019-03-19 RX ORDER — ACETAMINOPHEN 325 MG/1
650 TABLET ORAL EVERY 4 HOURS PRN
Status: DISCONTINUED | OUTPATIENT
Start: 2019-03-19 | End: 2019-03-20 | Stop reason: HOSPADM

## 2019-03-19 RX ORDER — NALOXONE HCL 0.4 MG/ML
0.4 VIAL (ML) INJECTION
Status: DISCONTINUED | OUTPATIENT
Start: 2019-03-19 | End: 2019-03-20 | Stop reason: HOSPADM

## 2019-03-19 RX ORDER — SODIUM CHLORIDE 0.9 % (FLUSH) 0.9 %
3 SYRINGE (ML) INJECTION EVERY 12 HOURS SCHEDULED
Status: DISCONTINUED | OUTPATIENT
Start: 2019-03-19 | End: 2019-03-19 | Stop reason: HOSPADM

## 2019-03-19 RX ORDER — NALOXONE HCL 0.4 MG/ML
0.1 VIAL (ML) INJECTION
Status: DISCONTINUED | OUTPATIENT
Start: 2019-03-19 | End: 2019-03-20 | Stop reason: HOSPADM

## 2019-03-19 RX ORDER — LISINOPRIL 20 MG/1
20 TABLET ORAL DAILY
Status: DISCONTINUED | OUTPATIENT
Start: 2019-03-19 | End: 2019-03-20 | Stop reason: HOSPADM

## 2019-03-19 RX ORDER — PREGABALIN 150 MG/1
150 CAPSULE ORAL ONCE
Status: COMPLETED | OUTPATIENT
Start: 2019-03-19 | End: 2019-03-19

## 2019-03-19 RX ORDER — ONDANSETRON 2 MG/ML
4 INJECTION INTRAMUSCULAR; INTRAVENOUS EVERY 6 HOURS PRN
Status: DISCONTINUED | OUTPATIENT
Start: 2019-03-19 | End: 2019-03-19 | Stop reason: SDUPTHER

## 2019-03-19 RX ORDER — PANTOPRAZOLE SODIUM 40 MG/1
40 TABLET, DELAYED RELEASE ORAL EVERY MORNING
Status: DISCONTINUED | OUTPATIENT
Start: 2019-03-20 | End: 2019-03-20 | Stop reason: HOSPADM

## 2019-03-19 RX ORDER — ACETAMINOPHEN 160 MG/5ML
650 SOLUTION ORAL EVERY 4 HOURS PRN
Status: DISCONTINUED | OUTPATIENT
Start: 2019-03-19 | End: 2019-03-20 | Stop reason: HOSPADM

## 2019-03-19 RX ORDER — BISACODYL 10 MG
10 SUPPOSITORY, RECTAL RECTAL DAILY PRN
Status: DISCONTINUED | OUTPATIENT
Start: 2019-03-19 | End: 2019-03-20 | Stop reason: HOSPADM

## 2019-03-19 RX ORDER — GABAPENTIN 400 MG/1
400 CAPSULE ORAL EVERY 12 HOURS SCHEDULED
Status: DISCONTINUED | OUTPATIENT
Start: 2019-03-19 | End: 2019-03-20 | Stop reason: HOSPADM

## 2019-03-19 RX ORDER — FENTANYL CITRATE 50 UG/ML
50 INJECTION, SOLUTION INTRAMUSCULAR; INTRAVENOUS
Status: DISCONTINUED | OUTPATIENT
Start: 2019-03-19 | End: 2019-03-19 | Stop reason: HOSPADM

## 2019-03-19 RX ORDER — SODIUM CHLORIDE 9 MG/ML
120 INJECTION, SOLUTION INTRAVENOUS CONTINUOUS
Status: DISCONTINUED | OUTPATIENT
Start: 2019-03-19 | End: 2019-03-20 | Stop reason: HOSPADM

## 2019-03-19 RX ORDER — DOCUSATE SODIUM 100 MG/1
100 CAPSULE, LIQUID FILLED ORAL 2 TIMES DAILY PRN
Status: DISCONTINUED | OUTPATIENT
Start: 2019-03-19 | End: 2019-03-20 | Stop reason: HOSPADM

## 2019-03-19 RX ORDER — HYDROMORPHONE HYDROCHLORIDE 1 MG/ML
0.5 INJECTION, SOLUTION INTRAMUSCULAR; INTRAVENOUS; SUBCUTANEOUS
Status: DISCONTINUED | OUTPATIENT
Start: 2019-03-19 | End: 2019-03-20 | Stop reason: HOSPADM

## 2019-03-19 RX ORDER — CHOLECALCIFEROL (VITAMIN D3) 125 MCG
5 CAPSULE ORAL NIGHTLY PRN
Status: DISCONTINUED | OUTPATIENT
Start: 2019-03-19 | End: 2019-03-20 | Stop reason: HOSPADM

## 2019-03-19 RX ORDER — MONTELUKAST SODIUM 10 MG/1
10 TABLET ORAL NIGHTLY
Status: DISCONTINUED | OUTPATIENT
Start: 2019-03-19 | End: 2019-03-20 | Stop reason: HOSPADM

## 2019-03-19 RX ORDER — AMLODIPINE BESYLATE 5 MG/1
5 TABLET ORAL DAILY
Status: DISCONTINUED | OUTPATIENT
Start: 2019-03-20 | End: 2019-03-20 | Stop reason: HOSPADM

## 2019-03-19 RX ADMIN — GABAPENTIN 400 MG: 400 CAPSULE ORAL at 20:53

## 2019-03-19 RX ADMIN — FENTANYL CITRATE 50 MCG: 50 INJECTION INTRAMUSCULAR; INTRAVENOUS at 17:35

## 2019-03-19 RX ADMIN — VANCOMYCIN HYDROCHLORIDE 1750 MG: 10 INJECTION, POWDER, LYOPHILIZED, FOR SOLUTION INTRAVENOUS at 13:00

## 2019-03-19 RX ADMIN — PROPOFOL 50 MCG/KG/MIN: 10 INJECTION, EMULSION INTRAVENOUS at 13:50

## 2019-03-19 RX ADMIN — TRANEXAMIC ACID 1000 MG: 100 INJECTION, SOLUTION INTRAVENOUS at 13:50

## 2019-03-19 RX ADMIN — SODIUM CHLORIDE 120 ML/HR: 9 INJECTION, SOLUTION INTRAVENOUS at 17:47

## 2019-03-19 RX ADMIN — SODIUM CHLORIDE, POTASSIUM CHLORIDE, SODIUM LACTATE AND CALCIUM CHLORIDE 9 ML/HR: 600; 310; 30; 20 INJECTION, SOLUTION INTRAVENOUS at 10:27

## 2019-03-19 RX ADMIN — MONTELUKAST SODIUM 10 MG: 10 TABLET, COATED ORAL at 20:50

## 2019-03-19 RX ADMIN — LIDOCAINE HYDROCHLORIDE 0.5 ML: 10 INJECTION, SOLUTION EPIDURAL; INFILTRATION; INTRACAUDAL; PERINEURAL at 10:27

## 2019-03-19 RX ADMIN — ATORVASTATIN CALCIUM 40 MG: 40 TABLET, FILM COATED ORAL at 20:50

## 2019-03-19 RX ADMIN — FAMOTIDINE 20 MG: 20 TABLET ORAL at 10:25

## 2019-03-19 RX ADMIN — ACETAMINOPHEN 1000 MG: 500 TABLET ORAL at 10:25

## 2019-03-19 RX ADMIN — FENTANYL CITRATE 50 MCG: 50 INJECTION INTRAMUSCULAR; INTRAVENOUS at 16:15

## 2019-03-19 RX ADMIN — MELOXICAM 15 MG: 7.5 TABLET ORAL at 10:26

## 2019-03-19 RX ADMIN — HYDROMORPHONE HYDROCHLORIDE 0.5 MG: 1 INJECTION, SOLUTION INTRAMUSCULAR; INTRAVENOUS; SUBCUTANEOUS at 19:37

## 2019-03-19 RX ADMIN — ONDANSETRON 4 MG: 2 INJECTION INTRAMUSCULAR; INTRAVENOUS at 15:46

## 2019-03-19 RX ADMIN — HYDROCODONE BITARTRATE AND ACETAMINOPHEN 1 TABLET: 10; 325 TABLET ORAL at 23:39

## 2019-03-19 RX ADMIN — FENTANYL CITRATE 50 MCG: 50 INJECTION INTRAMUSCULAR; INTRAVENOUS at 16:20

## 2019-03-19 RX ADMIN — LISINOPRIL 20 MG: 20 TABLET ORAL at 20:49

## 2019-03-19 RX ADMIN — MUPIROCIN 1 APPLICATION: 20 OINTMENT TOPICAL at 10:26

## 2019-03-19 RX ADMIN — FENTANYL CITRATE 50 MCG: 50 INJECTION INTRAMUSCULAR; INTRAVENOUS at 17:40

## 2019-03-19 RX ADMIN — HYDROCODONE BITARTRATE AND ACETAMINOPHEN 1 TABLET: 5; 325 TABLET ORAL at 20:50

## 2019-03-19 RX ADMIN — DEXAMETHASONE SODIUM PHOSPHATE 8 MG: 4 INJECTION, SOLUTION INTRAMUSCULAR; INTRAVENOUS at 13:50

## 2019-03-19 RX ADMIN — MIDAZOLAM HYDROCHLORIDE 2 MG: 1 INJECTION, SOLUTION INTRAMUSCULAR; INTRAVENOUS at 12:28

## 2019-03-19 RX ADMIN — TRANEXAMIC ACID 1000 MG: 100 INJECTION, SOLUTION INTRAVENOUS at 15:24

## 2019-03-19 RX ADMIN — MELATONIN TAB 5 MG 5 MG: 5 TAB at 23:39

## 2019-03-19 RX ADMIN — MORPHINE SULFATE 4 MG: 2 INJECTION, SOLUTION INTRAMUSCULAR; INTRAVENOUS at 21:13

## 2019-03-19 RX ADMIN — HYDROMORPHONE HYDROCHLORIDE 0.5 MG: 1 INJECTION, SOLUTION INTRAMUSCULAR; INTRAVENOUS; SUBCUTANEOUS at 23:39

## 2019-03-19 RX ADMIN — BUPIVACAINE HYDROCHLORIDE 2.2 ML: 5 INJECTION, SOLUTION PERINEURAL at 13:44

## 2019-03-19 RX ADMIN — PREGABALIN 150 MG: 150 CAPSULE ORAL at 10:26

## 2019-03-19 RX ADMIN — ALPRAZOLAM 0.5 MG: 0.5 TABLET ORAL at 23:07

## 2019-03-19 RX ADMIN — HYDROCODONE BITARTRATE AND ACETAMINOPHEN 1 TABLET: 10; 325 TABLET ORAL at 16:40

## 2019-03-19 RX ADMIN — SODIUM CHLORIDE, PRESERVATIVE FREE 3 ML: 5 INJECTION INTRAVENOUS at 21:14

## 2019-03-19 NOTE — ANESTHESIA POSTPROCEDURE EVALUATION
Patient: Patricio Iverson    Procedure Summary     Date:  03/19/19 [facility]:   [facility]    Anesthesia Start:  1337 Anesthesia Stop:  1601    Procedure:  TOTAL RIGHT  HIP ARTHROPLASTY (Right Hip) Diagnosis:       Primary osteoarthritis of right hip      (Primary osteoarthritis of right hip [M16.11])    Surgeon:  Anil Graham MD Provider:  Christiano Lowery MD    Anesthesia Type:  MAC, spinal ASA Status:  3          Anesthesia Type: MAC, spinal  Last vitals  BP   141/89 (03/19/19 1046)   Temp   98.4 °F (36.9 °C) (03/19/19 1046)   Pulse   75 (03/19/19 1046)   Resp   18 (03/19/19 1046)     SpO2   96 % (03/19/19 1046)     Post Anesthesia Care and Evaluation    Patient location during evaluation: PACU  Patient participation: complete - patient participated  Level of consciousness: awake and alert  Pain score: 0  Pain management: adequate  Airway patency: patent  Anesthetic complications: No anesthetic complications  PONV Status: none  Cardiovascular status: hemodynamically stable and acceptable  Respiratory status: nonlabored ventilation, acceptable and nasal cannula  Hydration status: acceptable

## 2019-03-19 NOTE — PLAN OF CARE
Problem: Patient Care Overview  Goal: Plan of Care Review   03/19/19 1850   Coping/Psychosocial   Plan of Care Reviewed With patient;significant other   Plan of Care Review   Progress no change   OTHER   Outcome Summary 52 yo male admitted 3/19/18 for right total hip. Pain control and mobility goals for this evening. Family present and interactive in care.        Problem: Fall Risk (Adult)  Goal: Identify Related Risk Factors and Signs and Symptoms  Outcome: Ongoing (interventions implemented as appropriate)   03/19/19 1850   Fall Risk (Adult)   Related Risk Factors (Fall Risk) gait/mobility problems;environment unfamiliar   Signs and Symptoms (Fall Risk) presence of risk factors       Problem: Knee Arthroplasty (Total, Partial) (Adult)  Goal: Signs and Symptoms of Listed Potential Problems Will be Absent, Minimized or Managed (Knee Arthroplasty)  Outcome: Ongoing (interventions implemented as appropriate)   03/19/19 1850   Goal/Outcome Evaluation   Problems Assessed (Knee Arthroplasty) all   Problems Present (Knee Arthroplasty) pain;range of motion decreased

## 2019-03-19 NOTE — ANESTHESIA PREPROCEDURE EVALUATION
Anesthesia Evaluation     Patient summary reviewed and Nursing notes reviewed   no history of anesthetic complications:  NPO Solid Status: > 8 hours  NPO Liquid Status: > 8 hours           Airway   Mallampati: II  TM distance: >3 FB  Neck ROM: full  No difficulty expected  Dental - normal exam     Pulmonary - normal exam    breath sounds clear to auscultation  (+) sleep apnea on CPAP,   Cardiovascular - normal exam    ECG reviewed  Rhythm: regular  Rate: normal        Neuro/Psych- negative ROS  GI/Hepatic/Renal/Endo    (+) obesity,  GERD,      Musculoskeletal     Abdominal    Substance History      OB/GYN          Other   (+) arthritis                     Anesthesia Plan    ASA 3     MAC and spinal     intravenous induction   Anesthetic plan, all risks, benefits, and alternatives have been provided, discussed and informed consent has been obtained with: patient.    Plan discussed with CRNA.

## 2019-03-19 NOTE — H&P
Patient Name: Patricio Iverson  MRN: [id number]  : 1965  DOS: 3/19/2019    Attending: Anil Graham MD    Primary Care Provider: Sarbjit Rebolledo MD      Chief complaint:  Right hip pain    Subjective   Patient is a 53 y.o. male presented for scheduled surgery by Dr. Graham.  He anticipates a right total hip replacement today.  He has hip has been painful for many years.  He is a cane for ambulation.    When seen preop he is doing well.  He denies pain or other complaints. He denies nausea, shortness of breath or chest pain. No hx of DVT or PE.    He has had chronic back pain for many years and chronic narcotic use managed by PCP.    (Above is noted, agree.  I saw Mr. Iverson in PACU after surgery.  He underwent right total hip arthroplasty under spinal anesthesia, he tolerated surgery well and is being admitted for further management.  His pain is under good control after surgery.  No nausea or vomiting, no shortness of breath or chest pain.)  [place]    Allergies:  Allergies   Allergen Reactions   • Keflex [Cephalexin] Hives       Meds:  Medications Prior to Admission   Medication Sig Dispense Refill Last Dose   • ALLERGY SERUM INJECTION Inject  under the skin into the appropriate area as directed 1 (One) Time.      • ALPRAZolam (XANAX) 0.5 MG tablet Take 0.5 mg by mouth 3 (Three) Times a Day As Needed.  2 3/19/2019   • amLODIPine (NORVASC) 5 MG tablet Take 5 mg by mouth Daily.  3 3/19/2019 at       • atorvastatin (LIPITOR) 40 MG tablet Take 40 mg by mouth Daily.  3 3/18/2019   • cetirizine (zyrTEC) 10 MG tablet Take 10 mg by mouth Daily.   3/19/2019   • desloratadine (CLARINEX) 5 MG tablet TAKE ONE TABLET BY MOUTH EVERY DAY AT BEDTIME AS DIRECTED  6 3/18/2019   • fenofibrate 160 MG tablet Take 160 mg by mouth Every Night.  3 3/18/2019   • gabapentin (NEURONTIN) 800 MG tablet Take 400 mg by mouth 2 (Two) Times a Day.  4 3/18/2019   • Ginkgo Biloba (GINKOBA PO) Take 1 capsule by mouth Daily.    "3/18/2019   • HYDROcodone-acetaminophen (NORCO) 7.5-325 MG per tablet Take 1 tablet by mouth 3 (Three) Times a Day As Needed for Moderate Pain .  0 3/19/2019 at 0830   • ipratropium (ATROVENT) 0.06 % nasal spray 1 spray into the nostril(s) as directed by provider 2 (Two) Times a Day As Needed for Rhinitis.   Past Month   • lisinopril (PRINIVIL,ZESTRIL) 20 MG tablet Take 20 mg by mouth Daily.  3 3/18/2019   • loratadine (CLARITIN) 10 MG tablet Take 10 mg by mouth Daily.   3/19/2019   • Melatonin 10 MG capsule Take 2 capsules by mouth Every Night.   3/18/2019   • montelukast (SINGULAIR) 10 MG tablet Take 10 mg by mouth Every Night.  5 3/18/2019   • Multiple Vitamins-Minerals (MULTIVITAMIN ADULT PO) Take 1 capsule by mouth Daily.   3/18/2019   • omeprazole (priLOSEC) 20 MG capsule Take 20 mg by mouth Daily.   3/19/2019   • sildenafil (REVATIO) 20 MG tablet TAKE UP TO 3 TABLETS BY MOUTH EVERY DAY AS NEEDED  5 Past Month   • diclofenac (VOLTAREN) 75 MG EC tablet Take 75 mg by mouth Daily.  11 3/12/2019   • glucosamine-chondroitin 500-400 MG capsule capsule Take 1 capsule by mouth 2 (Two) Times a Day With Meals.   3/12/2019   • Omega-3 Fatty Acids (FISH OIL) 1200 MG capsule delayed-release Take 1 capsule by mouth 2 (Two) Times a Day. 1290   3/12/2019   • Testosterone Cypionate (DEPOTESTOTERONE CYPIONATE) 200 MG/ML injection INJECT 1 ML IM EVERY 14 DAYS-  1 3/13/2019       History:   Past Medical History:   Diagnosis Date   • Abdominal hernia    • Arthritis    • GERD (gastroesophageal reflux disease)    • Headache    • [place] (hard of hearing)    • Hyperlipidemia    • Hypertriglyceridemia    • Migraine    • Seasonal allergies    • Sleep apnea with use of continuous positive airway pressure (CPAP)     compliant with machine    • SOBOE (shortness of breath on exertion)    • Wears glasses    • Wears partial dentures     \"dental implant\"  tops and most bottom - crowns and implants      Past Surgical History:   Procedure " Laterality Date   • COLONOSCOPY     • DENTAL PROCEDURE  08/2018    Dental Implants   • ENDOSCOPY     • EPIDURAL      injection in shoulder    • HERNIA REPAIR      umbilical hernia on left side    • KNEE SURGERY Right 2010    scope   • SHOULDER SURGERY Right 2010    labrium and rotator cuff- couple times    • TONSILLECTOMY     • WISDOM TOOTH EXTRACTION      all 4 removed    • WRIST SURGERY Right      Family History   Problem Relation Age of Onset   • No Known Problems Mother    • Cancer Father      Social History     Tobacco Use   • Smoking status: Never Smoker   • Smokeless tobacco: Never Used   Substance Use Topics   • Alcohol use: Yes     Alcohol/week: 8.4 oz     Types: 7 Cans of beer, 7 Shots of liquor per week   • Drug use: No   Lives with his significant other.  He has 2 children.  Works in maintenance.    Review of Systems  All systems were reviewed and negative except for:  Respiratory: positive for  BATEMAN    Vital Signs  Visit Vitals  /89 (BP Location: Right arm, Patient Position: Lying)   Pulse 75   Temp 98.4 °F (36.9 °C) (Temporal)   Resp 18   SpO2 96%       Physical Exam:    General Appearance:    Alert, cooperative, in no acute distress   Head:    Normocephalic, without obvious abnormality, atraumatic   Eyes:            Lids and lashes normal, conjunctivae and sclerae normal, no   icterus, no pallor, corneas clear   Ears:    Ears appear intact with no abnormalities noted   Neck:   No adenopathy, supple, trachea midline, no thyromegaly   Lungs:     Clear to auscultation,respirations regular, even and                   unlabored    Heart:    Regular rhythm and normal rate, normal S1 and S2, no            murmur, no gallop   Abdomen:     Normal bowel sounds, no masses, no organomegaly, soft        non-tender, non-distended, no guarding, no rebound                 tenderness   Genitalia:    Deferred   Extremities:   Moves all extremities well, no edema, no cyanosis, no               Redness  (Postoperative exam: Right hip with clean dry and intact dressing on.  Lower extremities with intact motor and sensory function distally.  Normal cap refill and pulses.)  [place]   Pulses:   Pulses palpable and equal bilaterally   Skin:   No bleeding, bruising or rash   Neurologic:   Cranial nerves 2 - 12 grossly intact, sensation intact      I reviewed the patient's new clinical results.     Results for RAMIRO GARZA (MRN [id number]) as of 3/19/2019 13:49   Ref. Range 3/7/2019 11:25   C-Reactive Protein Latest Ref Range: 0.00 - 1.00 mg/dL 0.07   Protime Latest Ref Range: 11.2 - 14.3 Seconds 12.6   INR Latest Ref Range: 0.85 - 1.16  0.99   PTT Latest Ref Range: 24.0 - 37.0 seconds 29.7   Sed Rate Latest Ref Range: 0 - 20 mm/hr 8     Lab Results   Component Value Date    HGBA1C 5.50 03/07/2019       Assessment and Plan:     Status post right total hip arthroplasty.    Primary osteoarthritis of right hip    HTN (hypertension)    HLD (hyperlipidemia)    FAIZAN on CPAP    Chronic pain    Chronic narcotic use      Plan  1. PT/OT- early ambulation post op  2. Pain control-prns   3. IS-encourage  4. DVT proph- mechs/ASA  5. Bowel regimen  6. Resume home medications as appropriate  7. Monitor post-op labs  8. DC planning for home    HTN, HLD  - Continue home norvasc, statin and lisinopril  - Monitor BP   - Holding parameters for BP meds  - Labetalol PRN for SBP>170    FAIZAN  - CPAP at night      ALLYSON Souza  03/19/19  1:49 PM     I have personally performed the evaluation on this patient. My history is consistent  with HPI obtained. My exam findings are listed above. I have personally reviewed and discussed the above formulated treatment plan with pt and .Rj.

## 2019-03-19 NOTE — H&P
Pre-Op H&P  Patricio Iverson  [id number]  1965    Chief complaint: Right hip pain    HPI:    Patient is a 53 y.o.male who presents with right hip pain and found to have end stage right hip arthritis.  Here today for right total hip arthroplasty.  Recent right shoulder steroid injection 3/7/19    Review of Systems:  General ROS: negative for chills, fever or skin lesions;  No changes since last office visit  Cardiovascular ROS: no chest pain or dyspnea on exertion  Respiratory ROS: no cough, shortness of breath, or wheezing    Allergies:   Allergies   Allergen Reactions   • Keflex [Cephalexin] Hives       Home Meds:    No current facility-administered medications on file prior to encounter.      Current Outpatient Medications on File Prior to Encounter   Medication Sig Dispense Refill   • ALLERGY SERUM INJECTION Inject  under the skin into the appropriate area as directed 1 (One) Time.     • ALPRAZolam (XANAX) 0.5 MG tablet Take 0.5 mg by mouth 3 (Three) Times a Day As Needed.  2   • amLODIPine (NORVASC) 5 MG tablet Take 5 mg by mouth Daily.  3   • atorvastatin (LIPITOR) 40 MG tablet Take 40 mg by mouth Daily.  3   • chlorhexidine (HIBICLENS) 4 % external liquid  Shower with hibiclens solution as directed for 5 days prior to surgery 236 mL 0   • desloratadine (CLARINEX) 5 MG tablet TAKE ONE TABLET BY MOUTH EVERY DAY AT BEDTIME AS DIRECTED  6   • fenofibrate 160 MG tablet Take 160 mg by mouth Every Night.  3   • gabapentin (NEURONTIN) 800 MG tablet Take 400 mg by mouth 2 (Two) Times a Day.  4   • HYDROcodone-acetaminophen (NORCO) 7.5-325 MG per tablet Take 1 tablet by mouth 3 (Three) Times a Day As Needed for Moderate Pain .  0   • ipratropium (ATROVENT) 0.06 % nasal spray 1 spray into the nostril(s) as directed by provider 2 (Two) Times a Day As Needed for Rhinitis.     • lisinopril (PRINIVIL,ZESTRIL) 20 MG tablet Take 20 mg by mouth Daily.  3   • montelukast (SINGULAIR) 10 MG tablet Take 10 mg by mouth  "Every Night.  5   • mupirocin (BACTROBAN) 2 % ointment Apply into the nostril(s) as directed by provider 2 (Two) Times a Day. 22 g 0   • sildenafil (REVATIO) 20 MG tablet TAKE UP TO 3 TABLETS BY MOUTH EVERY DAY AS NEEDED  5   • Testosterone Cypionate (DEPOTESTOTERONE CYPIONATE) 200 MG/ML injection INJECT 1 ML IM EVERY 14 DAYS-  1       PMH:   Past Medical History:   Diagnosis Date   • Abdominal hernia    • Arthritis    • GERD (gastroesophageal reflux disease)    • Headache    • [place] (hard of hearing)    • Hyperlipidemia    • Hypertriglyceridemia    • Migraine    • Seasonal allergies    • Sleep apnea with use of continuous positive airway pressure (CPAP)     compliant with machine    • SOBOE (shortness of breath on exertion)    • Wears glasses    • Wears partial dentures     \"dental implant\"  tops and most bottom - crowns and implants      PSH:    Past Surgical History:   Procedure Laterality Date   • COLONOSCOPY     • DENTAL PROCEDURE  08/2018    Dental Implants   • ENDOSCOPY     • EPIDURAL      injection in shoulder    • HERNIA REPAIR      umbilical hernia on left side    • KNEE SURGERY Right 2010    scope   • SHOULDER SURGERY Right 2010    labrium and rotator cuff- couple times    • TONSILLECTOMY     • WISDOM TOOTH EXTRACTION      all 4 removed    • WRIST SURGERY Right      Social History:   Tobacco:   Social History     Tobacco Use   Smoking Status Never Smoker   Smokeless Tobacco Never Used      Alcohol:     Social History     Substance and Sexual Activity   Alcohol Use Yes   • Alcohol/week: 8.4 oz   • Types: 7 Cans of beer, 7 Shots of liquor per week       Vitals:           /89 (BP Location: Right arm, Patient Position: Lying)   Pulse 75   Temp 98.4 °F (36.9 °C) (Temporal)   Resp 18   SpO2 96%     Physical Exam:  General Appearance:    Alert, cooperative, no distress, appears stated age   Head:    Normocephalic, without obvious abnormality, atraumatic   Lungs:     Clear to auscultation bilaterally, " respirations unlabored    Heart:   Regular rate and rhythm, S1 and S2 normal, no murmur, rub    or gallop    Abdomen:    Soft, non-tender.  +bowel sounds   Breast Exam:    deferred   Genitalia:    deferred   Extremities:   Extremities normal, atraumatic, no cyanosis or edema   Skin:   Skin color, texture, turgor normal, no rashes or lesions   Neurologic:   Grossly intact   Results Review  I reviewed the patient's new clinical results.    Cancer Staging (if applicable)  Cancer Patient: __ yes _x_no __unknown; If yes, clinical stage T:__ N:__M:__, stage group or __N/A    Impression: Osteoarthritis of right hip    Plan: I reviewed my findings with patient today. He would like to proceed with hip replacement surgery, knowing the risks, benefits, and alternatives.  Please see my counseling note for details.  He has exhausted conservative treatment options.       Surgical Counseling      I have informed the patient of the diagnosis and the prognosis.  Exhaustive conservative treatment modalities have not resulted in long term pain relief.  The symptoms have progressed to the point of daily pain and inability to perform activities of daily living without significant pain.  The patient has reached the point of desiring to proceed with total hip arthroplasty after discussing the risks, benefits and alternatives to the procedure.  The surgical procedure itself was discussed in detail.  Risks of the procedure were discussed, which included but are not limited to, bleeding, infection, damage to blood vessels and nerves, incomplete pain relief, loosening of the prosthesis, deep infection, need for further surgery, leg length discrepancy, hip dislocation, loss of limb, deep venous thrombosis, pulmonary embolus, death, heart attack, stroke, kidney failure, liver failure, and anesthetic complications.  In addition, the potential for deep infection developing in the future was discussed, which could require further surgery.  The hip  would have to be re-opened, debrided, and potentially remove the prosthesis, which may or may not be replaced in the future.  Also, the possibility for loosening of the prosthesis has been mentioned.  If the prosthesis loosened, a revision arthroplasty could be performed, with results that are not as predictable compared to the original procedure.  The typical rehabilitative course has also been discussed, and full recovery may take up to a year to see the maximum benefit.  The importance of patient cooperation in the rehabilitative efforts has also been discussed.  No guarantees whatsoever were given.  The patient understands the potential risks versus the benefits and desires to proceed with total hip arthroplasty at a mutually convenient time.    ALLYSON Baltazar   3/19/2019   12:06 PM       Agree with above.  Plan for right total hip arthroplasty.

## 2019-03-19 NOTE — ANESTHESIA PROCEDURE NOTES
Spinal Block      Patient reassessed immediately prior to procedure    Patient location during procedure: OR  Indication:at surgeon's request  Performed By  CRNA: Larry Licea CRNA  Preanesthetic Checklist  Completed: patient identified, site marked, surgical consent, pre-op evaluation, timeout performed, IV checked, risks and benefits discussed and monitors and equipment checked  Spinal Block Prep:  Patient Position:sitting  Sterile Tech:cap, gloves, sterile barriers and mask  Prep:Chloraprep  Patient Monitoring:blood pressure monitoring, continuous pulse oximetry and EKG  Spinal Block Procedure  Approach:midline  Guidance:landmark technique and palpation technique  Location:L4-L5  Needle Type:Esha  Needle Gauge:25 G  Placement of Spinal needle event:cerebrospinal fluid aspirated  Paresthesia: no  Fluid Appearance:clear  Medications: bupivacaine (MARCAINE) 0.5 % injection, 2.2 mL  Med Administered at 3/19/2019 1:44 PM   Post Assessment  Patient Tolerance:patient tolerated the procedure well with no apparent complications  Complications no  Additional Notes  Procedure:  Pt assisted to sitting position, with legs in position of comfort over side of bed.  Pt. instructed in optimal spine presentation, the spine was prepped/ Draped and the skin at insertion site was anesthetized with 1% Lidocaine 2 ml.  The spinal needle was then advanced until CSF flow was obtained and LA was injected:

## 2019-03-19 NOTE — OP NOTE
DATE OF PROCEDURE:  03/19/19    PREOPERATIVE DIAGNOSIS: right hip arthritis    POSTOPERATIVE DIAGNOSIS: right hip arthritis    PROCEDURE PERFORMED: right total hip arthroplasty with [employer] components (#52 press-fit R3 cup with +4 lateralized neutral polyethylene liner with # 16 high offset press-fit Synergy stem with 36 x +0 ceramic head).     SURGEON: Anil Graham MD    ASSISTANT: Lexii Elaine PA-C     SPECIMENS: None    ANESTHESIA:  Spinal    STAFF:  Circulator: Juan Kline RN; Rosa Elena Jordan RN  Scrub Person: Senait Rueda; Tano Dennis  Vendor Representative: Karime Ellis Chip  Nursing Assistant: Jaret Oliveira; Domingo Wilson  Assistant: Lexii Elaine PA-C    ESTIMATED BLOOD LOSS: 200ml     COMPLICATIONS: None    PREOPERATIVE ANTIBIOTICS: Vancomycin 1750 mg    INDICATIONS: The patient is a 53 y.o. male  with a history of debilitating right hip pain secondary to advanced osteoarthritis, that failed to improve in spite of conservative treatment. The patient opted for a right total hip arthroplasty at this time and consented for the procedure. Please see my office notes for details with regard to preoperative counseling and operative rationale.     DESCRIPTION OF PROCEDURE: The patient was positively identified in the preoperative holding area, brought to the operative suite, and placed in a supine position. After adequate spinal anesthetic had been achieved, the patient was placed in lateral decubitus position with the right side up. The patient was well padded on the pegboard table. After sterile prep and drape of the right hip and lower extremity, a timeout procedure was performed to confirm the operative site, as well as the other parameters. A skin incision was made on the lateral aspect of the hip for a modified direct lateral approach. Following a sharp skin incision, dissection was carried down to the level of the fascia which was incised in line with its fibers.  The underlying interval between the anterior one third and posterior two thirds of the gluteus medius was then entered after the bursa had been reflected posteriorly. This was elevated as a full thickness flap and preserved for later repair. The hip capsule was then incised in a T-shaped fashion and the head dislocated from the acetabulum. Description of femoral head: Complete eburnation, with deformity, consistent with advanced arthritis. The head was then resected with the aid of an oscillating saw blade. Description of acetabulum: Complete wear, with mild thickening of the labrum around the periphery inferiorly, but very thickened superiorly. The acetabulum was sequentially reamed up to 52 to accommodate a 52 press-fit R3 cup, which had excellent press-fit characteristics and a single 30 mm screw was added, which had good purchase. A trial +4 lateralized neutral polyethylene liner was placed. Attention was then redirected towards the femoral aspect. Sequential reaming and broaching was performed on the femur to accommodate a #16 broach with a 36 x +0 head, with a high offset neck.  Trial reduction was performed, with no dislocation throughout the full arc of motion, with appropriate limb lengths.  The hip was again dislocated, and the final +4 lateralized neutral polyethylene liner was placed and the final #16 I offset press-fit Synergy stem was placed, followed by 36 x 0 ceramic head, and the same reduction characteristics were noted as with the trial with no dislocation throughout the full arc of motion, and appropriate limb lengths.  Therefore, the hip was copiously irrigated and attention was directed towards closure. The hip capsule was closed with #1 Vicryl in an interrupted figure-of-eight fashion, followed by closure of the gluteus medius and vastus lateralis sleeve as a full thickness sleeve repair with #1 Vicryl in an interrupted figure-of-eight fashion, followed by closure of the fascia kg with #1  Vicryl in an interrupted figure-of-eight fashion in 3 strategic locations both proximally, distally and in the central portion, followed by oversewing this from distal to proximal with a #1 StrataFix symmetric, which nicely sealed that layer, followed by closure of the deep fascial layer with #1 Vicryl in a buried interrupted fashion, followed by closure of the subcutaneous layer with 2-0 Vicryl and the skin with 3-0 StrataFix in a running subcuticular fashion. Prineo wound closure dressing was applied followed by a sterile dressing with 4 x 4s secured with micropore tape.  The patient tolerated the procedure well and was brought to the recovery room in good condition.     POSTOPERATIVE PLAN:  1. The patient will begin early range of motion and weight-bearing per the post total hip arthroplasty protocol.   2. I anticipate brief hospitalization for initial rehabilitation and pain control followed by continued rehabilitation in a home health setting.   3. Postoperative medical management with Dr. Ashley.  4. Postoperative DVT prophylaxis with aspirin unless there is a contraindication.       Anil Graham MD  03/19/19  3:48 PM

## 2019-03-20 VITALS
HEART RATE: 84 BPM | WEIGHT: 252.21 LBS | DIASTOLIC BLOOD PRESSURE: 70 MMHG | RESPIRATION RATE: 16 BRPM | BODY MASS INDEX: 36.11 KG/M2 | SYSTOLIC BLOOD PRESSURE: 137 MMHG | TEMPERATURE: 98.9 F | HEIGHT: 70 IN | OXYGEN SATURATION: 99 %

## 2019-03-20 PROBLEM — Z96.641 STATUS POST TOTAL HIP REPLACEMENT, RIGHT: Status: ACTIVE | Noted: 2019-03-20

## 2019-03-20 LAB
ANION GAP SERPL CALCULATED.3IONS-SCNC: 6 MMOL/L (ref 3–11)
BUN BLD-MCNC: 13 MG/DL (ref 9–23)
BUN/CREAT SERPL: 14.8 (ref 7–25)
CALCIUM SPEC-SCNC: 8.8 MG/DL (ref 8.7–10.4)
CHLORIDE SERPL-SCNC: 101 MMOL/L (ref 99–109)
CO2 SERPL-SCNC: 26 MMOL/L (ref 20–31)
CREAT BLD-MCNC: 0.88 MG/DL (ref 0.6–1.3)
DEPRECATED RDW RBC AUTO: 48.2 FL (ref 37–54)
ERYTHROCYTE [DISTWIDTH] IN BLOOD BY AUTOMATED COUNT: 13.4 % (ref 11.3–14.5)
GFR SERPL CREATININE-BSD FRML MDRD: 91 ML/MIN/1.73
GLUCOSE BLD-MCNC: 133 MG/DL (ref 70–100)
HCT VFR BLD AUTO: 44.3 % (ref 38.9–50.9)
HGB BLD-MCNC: 14.6 G/DL (ref 13.1–17.5)
MCH RBC QN AUTO: 32.4 PG (ref 27–31)
MCHC RBC AUTO-ENTMCNC: 33 G/DL (ref 32–36)
MCV RBC AUTO: 98.2 FL (ref 80–99)
PLATELET # BLD AUTO: 238 10*3/MM3 (ref 150–450)
PMV BLD AUTO: 9.6 FL (ref 6–12)
POTASSIUM BLD-SCNC: 4.2 MMOL/L (ref 3.5–5.5)
RBC # BLD AUTO: 4.51 10*6/MM3 (ref 4.2–5.76)
SODIUM BLD-SCNC: 133 MMOL/L (ref 132–146)
WBC NRBC COR # BLD: 18.27 10*3/MM3 (ref 3.5–10.8)

## 2019-03-20 PROCEDURE — 85027 COMPLETE CBC AUTOMATED: CPT | Performed by: ORTHOPAEDIC SURGERY

## 2019-03-20 PROCEDURE — 94660 CPAP INITIATION&MGMT: CPT

## 2019-03-20 PROCEDURE — 97116 GAIT TRAINING THERAPY: CPT

## 2019-03-20 PROCEDURE — 97161 PT EVAL LOW COMPLEX 20 MIN: CPT

## 2019-03-20 PROCEDURE — 80048 BASIC METABOLIC PNL TOTAL CA: CPT | Performed by: NURSE PRACTITIONER

## 2019-03-20 PROCEDURE — 25010000002 VANCOMYCIN 10 G RECONSTITUTED SOLUTION: Performed by: ORTHOPAEDIC SURGERY

## 2019-03-20 PROCEDURE — 99024 POSTOP FOLLOW-UP VISIT: CPT | Performed by: ORTHOPAEDIC SURGERY

## 2019-03-20 PROCEDURE — 97535 SELF CARE MNGMENT TRAINING: CPT

## 2019-03-20 PROCEDURE — 94799 UNLISTED PULMONARY SVC/PX: CPT

## 2019-03-20 PROCEDURE — 97166 OT EVAL MOD COMPLEX 45 MIN: CPT

## 2019-03-20 PROCEDURE — 97110 THERAPEUTIC EXERCISES: CPT

## 2019-03-20 RX ORDER — OXYCODONE AND ACETAMINOPHEN 7.5; 325 MG/1; MG/1
1 TABLET ORAL EVERY 6 HOURS PRN
Qty: 20 TABLET | Refills: 0 | Status: SHIPPED | OUTPATIENT
Start: 2019-03-20 | End: 2019-05-15

## 2019-03-20 RX ORDER — HYDROCODONE BITARTRATE AND ACETAMINOPHEN 10; 325 MG/1; MG/1
1 TABLET ORAL EVERY 4 HOURS PRN
Status: DISCONTINUED | OUTPATIENT
Start: 2019-03-20 | End: 2019-03-20 | Stop reason: HOSPADM

## 2019-03-20 RX ORDER — PSEUDOEPHEDRINE HCL 30 MG
100 TABLET ORAL 2 TIMES DAILY PRN
Qty: 40 EACH | Refills: 0 | Status: SHIPPED | OUTPATIENT
Start: 2019-03-20

## 2019-03-20 RX ADMIN — ASPIRIN 325 MG: 325 TABLET, COATED ORAL at 09:48

## 2019-03-20 RX ADMIN — MELOXICAM 15 MG: 7.5 TABLET ORAL at 09:48

## 2019-03-20 RX ADMIN — AMLODIPINE BESYLATE 5 MG: 5 TABLET ORAL at 09:48

## 2019-03-20 RX ADMIN — PANTOPRAZOLE SODIUM 40 MG: 40 TABLET, DELAYED RELEASE ORAL at 06:07

## 2019-03-20 RX ADMIN — VANCOMYCIN HYDROCHLORIDE 1750 MG: 10 INJECTION, POWDER, LYOPHILIZED, FOR SOLUTION INTRAVENOUS at 01:00

## 2019-03-20 RX ADMIN — GABAPENTIN 400 MG: 400 CAPSULE ORAL at 09:49

## 2019-03-20 RX ADMIN — LISINOPRIL 20 MG: 20 TABLET ORAL at 09:49

## 2019-03-20 RX ADMIN — HYDROCODONE BITARTRATE AND ACETAMINOPHEN 1 TABLET: 10; 325 TABLET ORAL at 06:07

## 2019-03-20 RX ADMIN — HYDROCODONE BITARTRATE AND ACETAMINOPHEN 1 TABLET: 10; 325 TABLET ORAL at 09:49

## 2019-03-20 RX ADMIN — SODIUM CHLORIDE, PRESERVATIVE FREE 3 ML: 5 INJECTION INTRAVENOUS at 09:49

## 2019-03-20 NOTE — DISCHARGE SUMMARY
Patient Name: Patricio Iverson  MRN: [id number]  : 1965  DOS: 3/20/2019    Attending: Anil Graham MD    Primary Care Provider: Sarbjit Rebolledo MD    Date of Admission:.3/19/2019  9:38 AM    Date of Discharge: 3/20/2019    Discharge Diagnosis:     Status post total hip replacement, right    Primary osteoarthritis of right hip    HTN (hypertension)    HLD (hyperlipidemia)    FAIZAN on CPAP    Chronic pain    Chronic narcotic use    Acute postoperative pain    Reactive leukocytosis    Hospital Course  Mr. Iverson is a very pleasant 53-year-old white male who has been seen and followed by Dr. Graham due to pain in his right hip.    Dr. Graham's note (The patient is a 53 y.o. male  with a history of debilitating right hip pain secondary to advanced osteoarthritis, that failed to improve in spite of conservative treatment. The patient opted for a right total hip arthroplasty at this time and consented for the procedure. Please see my office notes for details with regard to preoperative counseling and operative rationale.)    He underwent right total hip arthroplasty under spinal anesthesia, tolerated well, was admitted for further management.    The patient has done well postop. The patient has been able to ambulate 400 feet with PT.    The patient has had good pain control with PO pain medications.  Adjustments were made to pain medications to optimize postop pain management. Risks and benefits of opiate medications discussed with patient.  We discussed with him using the prescription given to him from the hospital and when done to go back on the regimen prescribed to him through his primary care provider    The patient was placed on DVT prophylaxis including aspirin. The patient was encouraged to use IS for atelectasis prophylaxis.     The patient was placed on a bowel regimen to prevent constipation while on pain medication.   The patient's H/H was monitored with a slight decrease that remained  "asymptomatic.    It is felt by all involved that the patient can discharge home at this time, and the patient has no further questions        Procedures Performed  Procedure(s):  TOTAL RIGHT HIP ARTHROPLASTY       Pertinent Test Results:    I reviewed the patient's new clinical results.   Results from last 7 days   Lab Units 19  0555   WBC 10*3/mm3 18.27*   HEMOGLOBIN g/dL 14.6   HEMATOCRIT % 44.3   PLATELETS 10*3/mm3 238     Results from last 7 days   Lab Units 19  0555   SODIUM mmol/L 133   POTASSIUM mmol/L 4.2   CHLORIDE mmol/L 101   CO2 mmol/L 26.0   BUN mg/dL 13   CREATININE mg/dL 0.88   CALCIUM mg/dL 8.8   GLUCOSE mg/dL 133*     I reviewed the patient's new imaging including images and reports.          Physical therapyPatient ambulated 400 feet with RW and step through gait pattern, limited by pain. Patient climbed 5 steps with 2 handrails with no difficulty. Patient has been d/c home with family and outpatient PT today.     Discharge Assessment:    Vital Signs  Visit Vitals  /73   Pulse 69   Temp 97.8 °F (36.6 °C) (Oral)   Resp 16   Ht 177.8 cm (70\")   Wt 114 kg (252 lb 3.3 oz)   SpO2 99%   BMI 36.19 kg/m²     Temp (24hrs), Av.8 °F (36.6 °C), Min:97.2 °F (36.2 °C), Max:98.2 °F (36.8 °C)      General Appearance:    Alert, cooperative, in no acute distress   Lungs:     Clear to auscultation,respirations regular, even and                   unlabored    Heart:    Regular rhythm and normal rate, normal S1 and S2   Abdomen:     Normal bowel sounds, no masses, no organomegaly, soft        non-tender, non-distended, no guarding, no rebound                 tenderness   Extremities:   Moves all extremities well, no edema, no cyanosis, no              Redness  Right hip prineo clean dry and intact.   Pulses:   Pulses palpable and equal bilaterally   Skin:   No bleeding, bruising or rash   Neurologic:   Cranial nerves 2 - 12 grossly intact, sensation intact, flexion dorsiflexion intact bilateral " feet.       Discharge Disposition: Home, outpatient PT    Discharge Medications     Discharge Medications      New Medications      Instructions Start Date   aspirin 325 MG EC tablet   325 mg, Oral, Daily   Start Date:  3/21/2019     docusate sodium 100 MG capsule   100 mg, Oral, 2 Times Daily PRN      oxyCODONE-acetaminophen 7.5-325 MG per tablet  Commonly known as:  PERCOCET   1 tablet, Oral, Every 6 Hours PRN         Continue These Medications      Instructions Start Date   ALLERGY SERUM INJECTION   Subcutaneous, Once      ALPRAZolam 0.5 MG tablet  Commonly known as:  XANAX   0.5 mg, Oral, 3 Times Daily PRN      amLODIPine 5 MG tablet  Commonly known as:  NORVASC   5 mg, Oral, Daily      atorvastatin 40 MG tablet  Commonly known as:  LIPITOR   40 mg, Oral, Daily      cetirizine 10 MG tablet  Commonly known as:  zyrTEC   10 mg, Oral, Daily      diclofenac 75 MG EC tablet  Commonly known as:  VOLTAREN   75 mg, Oral, Daily      fenofibrate 160 MG tablet   160 mg, Oral, Nightly      Fish Oil 1200 MG capsule delayed-release   1 capsule, Oral, 2 Times Daily, 1290       gabapentin 800 MG tablet  Commonly known as:  NEURONTIN   400 mg, Oral, 2 Times Daily      ipratropium 0.06 % nasal spray  Commonly known as:  ATROVENT   1 spray, Nasal, 2 Times Daily PRN      lisinopril 20 MG tablet  Commonly known as:  PRINIVIL,ZESTRIL   20 mg, Oral, Daily      Melatonin 10 MG capsule   2 capsules, Oral, Nightly      montelukast 10 MG tablet  Commonly known as:  SINGULAIR   10 mg, Oral, Nightly      MULTIVITAMIN ADULT PO   1 capsule, Oral, Daily      omeprazole 20 MG capsule  Commonly known as:  priLOSEC   20 mg, Oral, Daily      sildenafil 20 MG tablet  Commonly known as:  REVATIO   TAKE UP TO 3 TABLETS BY MOUTH EVERY DAY AS NEEDED      Testosterone Cypionate 200 MG/ML injection  Commonly known as:  DEPOTESTOTERONE CYPIONATE   INJECT 1 ML IM EVERY 14 DAYS-         Stop These Medications    CLARITIN 10 MG tablet  Generic drug:   loratadine     desloratadine 5 MG tablet  Commonly known as:  CLARINEX     GINKOBA PO     glucosamine-chondroitin 500-400 MG capsule capsule     HYDROcodone-acetaminophen 7.5-325 MG per tablet  Commonly known as:  NORCO            Discharge Diet: Regular    Activity at Discharge: Weightbearing as tolerated right lower extremity, posterior hip precautions.    Follow-up Appointments  Dr. Graham per his orders.  Primary CARE provider as previously scheduled.  Discussed with the patient and all questioned fully answered.     Discharge took over 30 min    Geraldo Ashley MD  03/20/19  10:54 AM

## 2019-03-20 NOTE — PROGRESS NOTES
Discharge Planning Assessment  [facility]     Patient Name: Patricio Iverson  MRN: [id number]  Today's Date: 3/20/2019    Admit Date: 3/19/2019    Discharge Needs Assessment     Row Name 03/20/19 1138       Living Environment    Lives With  significant other    Name(s) of Who Lives With Patient  Salina Pascual (Significant other) [phone]    Current Living Arrangements  home/apartment/condo    Primary Care Provided by  self    Provides Primary Care For  no one    Family Caregiver if Needed  significant other    Family Caregiver Names  Salina Pascual (S.O.) [phone]    Quality of Family Relationships  helpful;involved;supportive    Able to Return to Prior Arrangements  yes       Resource/Environmental Concerns    Resource/Environmental Concerns  home accessibility    Home Accessibility Concerns  stairs to enter home 3 stairs to enter home       Transition Planning    Patient/Family Anticipates Transition to  home with family    Patient/Family Anticipated Services at Transition  outpatient care;    Transportation Anticipated  family or friend will provide       Discharge Needs Assessment    Readmission Within the Last 30 Days  no previous admission in last 30 days    Concerns to be Addressed  discharge planning    Equipment Currently Used at Home  cane, straight;bipap/cpap;other (see comments);commode Patient has hip kit that he has purchased prior to surgery    Anticipated Changes Related to Illness  none    Equipment Needed After Discharge  walker, rolling Will need DME prior to discharge    Outpatient/Agency/Support Group Needs  outpatient therapy    Discharge Facility/Level of Care Needs  outpatient therapy    Offered/Gave Vendor List  yes    Patient's Choice of Community Agency(s)  [facility]        Discharge Plan     Row Name 03/20/19 1142       Plan    Plan  Home with outpaitent PT    Patient/Family in Agreement with Plan  yes    Plan Comments  Spoke with patient at  bedside.  Patient lives in [facility] with ROBERT Downing.  Patient is independent at baseline and does not use any DME.  He is not current with any home health or services.  Patient states that has a bedside commode at home, as well as a straight cane, and a hip kit that he has purchased for this surgery.  He states that he will need a rolling walker prior to discharge for home use.  Patient states that he does not want home health, but would like outpatient services with [facility].  Case management will call referral prior to discharge.  Patient states that he can afford his medicaitions.  Confirmed that PCP is Sarbjit Rebolledo M.D. and that insurance provider is [employer].  Patient anticipates discharge home today with family.  Family will transport at discharge.  Case management will continue to follow for discharge planning.      Final Discharge Disposition Code  01 - home or self-care        Destination      No service coordination in this encounter.      Durable Medical Equipment - Selection Complete      Service Provider Request Status Selected Services Address Phone Number Fax Number    [facility] Selected Durable Medical Equipment [address] [place] [id number] [phone]      Dialysis/Infusion      No service coordination in this encounter.      Home Medical Care      No service coordination in this encounter.      Community Resources      No service coordination in this encounter.        Expected Discharge Date and Time     Expected Discharge Date Expected Discharge Time    Mar 20, 2019         Demographic Summary     Row Name 03/20/19 1137       General Information    Admission Type  inpatient    Arrived From  home    Referral Source  admission list    Reason for Consult  discharge planning    Preferred Language  English     Used During This Interaction  no    General Information Comments  PCP:  Sarbjit Rebolledo M.D. confirmed with patient        Functional Status      Row Name 03/20/19 1137       Functional Status    Usual Activity Tolerance  good    Current Activity Tolerance  good       Functional Status, IADL    Medications  independent    Meal Preparation  independent    Housekeeping  independent    Laundry  independent    Shopping  independent        Psychosocial    No documentation.       Abuse/Neglect    No documentation.       Legal    No documentation.       Substance Abuse    No documentation.       Patient Forms    No documentation.           Gabriela Woods RN

## 2019-03-20 NOTE — PROGRESS NOTES
Case Management Discharge Note    Final Note: Patient discharging home today 3/20 with S.O.  Patient will need rolling walker for discharge.  Spoke with Sage at [facility].  He has verifed patient insurance, and will be able to provide patient with equipment at bedside before discharge home.  Order, H and P and discharge summary as well as facesheet faxed to Sage at [facility].  Patient has requested OPPT with [facility].  Spoke with Kimberly at [facility] who has veriifed patient insurance.  Faxed H and P, discharge summary and orders for OPPT to [facility] at [phone].  Appointment scheduled for PT for 3/21 at 1500.  This appointment has been added to AVS.  Patient will be transported home with family in private vehicle and has no further needs at this time.     Destination      No service has been selected for the patient.      Durable Medical Equipment - Selection Complete      Service Provider Request Status Selected Services Address Phone Number Fax Number    [facility] Selected Durable Medical Equipment [address] [facility] [address] [phone]      Dialysis/Infusion      No service has been selected for the patient.      Home Medical Care      No service has been selected for the patient.      Community Resources      No service has been selected for the patient.             Final Discharge Disposition Code: 01 - home or self-care

## 2019-03-20 NOTE — THERAPY DISCHARGE NOTE
"Acute Care - Physical Therapy Initial Eval/Discharge  [facility]     Patient Name: Patricio Iverson  : 1965  MRN: [id number]  Today's Date: 3/20/2019   Onset of Illness/Injury or Date of Surgery: 19  Date of Referral to PT: 19  Referring Physician: MD Santos      Admit Date: 3/19/2019    Visit Dx:    ICD-10-CM ICD-9-CM   1. Impaired functional mobility, balance, gait, and endurance Z74.09 V49.89   2. Primary osteoarthritis of right hip M16.11 715.15     Patient Active Problem List   Diagnosis   • Primary osteoarthritis of right hip   • HTN (hypertension)   • HLD (hyperlipidemia)   • FAIZAN on CPAP   • Chronic pain   • Chronic narcotic use     Past Medical History:   Diagnosis Date   • Abdominal hernia    • Arthritis    • GERD (gastroesophageal reflux disease)    • Headache    • [place] (hard of hearing)    • Hyperlipidemia    • Hypertriglyceridemia    • Migraine    • Seasonal allergies    • Sleep apnea with use of continuous positive airway pressure (CPAP)     compliant with machine    • SOBOE (shortness of breath on exertion)    • Wears glasses    • Wears partial dentures     \"dental implant\"  tops and most bottom - crowns and implants      Past Surgical History:   Procedure Laterality Date   • COLONOSCOPY     • DENTAL PROCEDURE  2018    Dental Implants   • ENDOSCOPY     • EPIDURAL      injection in shoulder    • HERNIA REPAIR      umbilical hernia on left side    • KNEE SURGERY Right     scope   • SHOULDER SURGERY Right     labrium and rotator cuff- couple times    • TONSILLECTOMY     • WISDOM TOOTH EXTRACTION      all 4 removed    • WRIST SURGERY Right           PT ASSESSMENT (last 12 hours)      Physical Therapy Evaluation     Row Name 19 0824          PT Evaluation Time/Intention    Subjective Information  complains of;pain  -LR     Document Type  evaluation;discharge evaluation/summary  -LR     Mode of Treatment  physical therapy;individual therapy  -LR     Patient Effort  " excellent  -LR     Symptoms Noted During/After Treatment  increased pain  -LR     Comment  Notified RN.   -LR     Row Name 03/20/19 0824          General Information    Patient Profile Reviewed?  yes  -LR     Onset of Illness/Injury or Date of Surgery  03/19/19  -LR     Referring Physician  MD Santos  -LR     Patient Observations  alert;cooperative;agree to therapy  -LR     Patient/Family Observations  SO present.   -LR     General Observations of Patient  Patient ambulating in hallway with nursing on arrival.   -LR     Prior Level of Function  min assist:;all household mobility;community mobility;gait;transfer;bed mobility;ADL's;home management;cooking;cleaning;shopping;using stairs;independent:;driving;work all mobility limited by pain, used SPC majority of the time.  -LR     Equipment Currently Used at Home  cane, straight;commode, 3-in-1;shower chair;sock aid reacher, used SPC majority of the time.   -LR     Pertinent History of Current Functional Problem  Patient presents for surgical management of persistent and progressive R hip pain and dysfunction that has failed to improve with conservative management.   -LR     Existing Precautions/Restrictions  fall;right;hip;other (see comments) R lateral hip precautions  -LR     Limitations/Impairments  -- none  -LR     Equipment Issued to Patient  -- none  -LR     Equipment Ordered for Patient  -- none  -LR     Risks Reviewed  patient:;spouse/S.O.:;LOB;nausea/vomiting;dizziness;increased discomfort;lines disloged  -LR     Benefits Reviewed  patient:;spouse/S.O.:;improve function;increase independence;increase strength;increase balance;decrease pain;decrease risk of DVT;increase knowledge  -LR     Barriers to Rehab  previous functional deficit  -LR     Row Name 03/20/19 0824          Relationship/Environment    Primary Source of Support/Comfort  significant other available to assist at all times upon d/c home.   -LR     Lives With  significant other;child(carlee),  dependent  -LR     Primary Roles/Responsibilities  wage earner, full time  -LR     Row Name 03/20/19 0824          Resource/Environmental Concerns    Current Living Arrangements  home/apartment/condo  -LR     Resource/Environmental Concerns  none  -LR     Transportation Concerns  car, none  -LR     Row Name 03/20/19 0824          Home Main Entrance    Number of Stairs, Main Entrance  three  -LR     Stair Railings, Main Entrance  railings on both sides of stairs  -LR     Row Name 03/20/19 0824          Cognitive Assessment/Intervention- PT/OT    Orientation Status (Cognition)  oriented x 4  -LR     Follows Commands (Cognition)  WFL;follows one step commands;over 90% accuracy;verbal cues/prompting required;repetition of directions required  -LR     Safety Deficit (Cognitive)  mild deficit;awareness of need for assistance;insight into deficits/self awareness;safety precautions awareness;safety precautions follow-through/compliance  -LR     Row Name 03/20/19 0824          Safety Issues, Functional Mobility    Safety Issues Affecting Function (Mobility)  awareness of need for assistance;insight into deficits/self awareness;safety precaution awareness;safety precautions follow-through/compliance  -LR     Row Name 03/20/19 0824          Mobility Assessment/Treatment    Extremity Weight-bearing Status  right lower extremity  -LR     Right Lower Extremity (Weight-bearing Status)  weight-bearing as tolerated (WBAT)  -LR     Row Name 03/20/19 0824          Bed Mobility Assessment/Treatment    Bed Mobility Assessment/Treatment  sit-supine  -LR     Sit-Supine [place] (Bed Mobility)  verbal cues;conditional independence  -LR     Bed Mobility, Safety Issues  decreased use of legs for bridging/pushing  -LR     Assistive Device (Bed Mobility)  head of bed elevated;bed rails  -LR     Comment (Bed Mobility)  Verbal cues to push from bed to scoot hips back and up into bed and to then lift LEs up into bed.   -LR     Row Name  03/20/19 0824          Transfer Assessment/Treatment    Transfer Assessment/Treatment  stand-sit transfer  -LR     Comment (Transfers)  Verbal cues to reach back for bed to lower into sitting and to step R LE out before t/f for comfort. Patient held on to RW when sitting despite these cues. PT demonstrated correct hand placement with t/f for patient.   -LR     Stand-Sit [place] (Transfers)  verbal cues;supervision  -LR     Row Name 03/20/19 0824          Stand-Sit Transfer    Assistive Device (Stand-Sit Transfers)  walker, front-wheeled  -LR     Row Name 03/20/19 0824          Gait/Stairs Assessment/Training    73627 - Gait Training Minutes   12  -LR     [place] Level (Gait)  verbal cues;stand by assist  -LR     Assistive Device (Gait)  walker, front-wheeled  -LR     Distance in Feet (Gait)  400  -LR     Pattern (Gait)  step-through  -LR     Deviations/Abnormal Patterns (Gait)  right sided deviations;antalgic;bilateral deviations;tonia decreased;gait speed decreased;stride length decreased  -LR     Bilateral Gait Deviations  forward flexed posture;heel strike decreased  -LR     Right Sided Gait Deviations  weight shift ability decreased  -LR     Negotiation (Stairs)  stairs independence;stairs assistive device;handrail location;number of steps;ascending technique;descending technique  -LR     [place] Level (Stairs)  verbal cues;contact guard  -LR     Handrail Location (Stairs)  both sides  -LR     Number of Steps (Stairs)  5  -LR     Ascending Technique (Stairs)  step-to-step  -LR     Descending Technique (Stairs)  step-to-step  -LR     Stairs, Safety Issues  sequencing ability decreased;weight-shifting ability decreased  -LR     Stairs, Impairments  ROM decreased;strength decreased;pain  -LR     Comment (Gait/Stairs)  Patient ambulated with step through gait pattern at slow pace with forward flexed posture and short stance phase on R, with pause in gait when stepping forward with L LE. Improved  with cues for upright posture, decreased UE weight bearing, increased R LE weight bearing/stance phase, and continuous forward motion of RW. Gait limited by pain and soreness/stiffness. Verbal cues to take step one at a time and to step up with strong LE first and down with weak LE first. No LOB or unsteadiness with stairs.   -     Row Name 03/20/19 0824          General ROM    RT Lower Ext  Comment  -LR     LT Lower Ext  Comment  -LR     Row Name 03/20/19 0824          Right Lower Ext    RT Lower Extremity Comments  R hip AROM impaired 25% by pain  -     Row Name 03/20/19 0824          Left Lower Ext    LT Lower Extremity Comments  L LE AROM WFL  -     Row Name 03/20/19 0824          MMT (Manual Muscle Testing)    Rt Lower Ext  Rt Knee WFL;Rt Ankle WFL;Comments  -LR     Lt Lower Ext  Lt Hip WFL;Lt Knee WFL;Lt Ankle WFL  -     Row Name 03/20/19 0824          MMT Right Lower Ext    Rt Lower Extremity Comments   R hip functionally 4-/5, independent with SLR, no knee buckling with gait.   -     Row Name 03/20/19 0824          Motor Assessment/Intervention    Additional Documentation  Therapeutic Exercise (Group);Therapeutic Exercise Interventions (Group)  -     Row Name 03/20/19 0824          Therapeutic Exercise    65473 - PT Therapeutic Exercise Minutes  11  -     Row Name 03/20/19 0824          Therapeutic Exercise    Lower Extremity (Therapeutic Exercise)  heel slides, right;LAQ (long arc quad), right;marching while standing;quad sets, right;SLR (straight leg raise), right;other (see comments) standing calf raises, mini squats, hip extension  -LR     Lower Extremity Range of Motion (Therapeutic Exercise)  hip abduction/adduction, right;ankle dorsiflexion/plantar flexion, right  -LR     Core Strength (Therapeutic Exercise)  bridging, bilateral lower extremities  -LR     Exercise Type (Therapeutic Exercise)  AROM (active range of motion);AAROM (active assistive range of motion);isometric contraction,  static;isotonic contraction, concentric  -LR     Position (Therapeutic Exercise)  seated;standing;supine  -LR     Sets/Reps (Therapeutic Exercise)  x15 reps each  -LR     Comment (Therapeutic Exercise)  cues for technique; min assist SLR, hip abduction.   -LR     Row Name 03/20/19 0824          Sensory Assessment/Intervention    Sensory General Assessment  no sensation deficits identified;other (see comments) denies numbness/tingling;able to actively DF bilaterally  -LR     Row Name 03/20/19 0824          Vision Assessment/Intervention    Visual Impairment/Limitations  WFL  -LR     Row Name 03/20/19 0824          Pain Assessment    Additional Documentation  Pain Scale: Numbers Pre/Post-Treatment (Group)  -LR     Row Name 03/20/19 0824          Pain Scale: Numbers Pre/Post-Treatment    Pain Scale: Numbers, Pretreatment  6/10  -LR     Pain Scale: Numbers, Post-Treatment  8/10  -LR     Pain Location - Side  Right  -LR     Pain Location  hip  -LR     Pain Intervention(s)  Repositioned;Ambulation/increased activity  -LR     Row Name             Wound 03/19/19 1420 Right hip incision    Wound - Properties Group Date first assessed: 03/19/19  -LD Time first assessed: 1420  -LD Side: Right  -LD Location: hip  -LD Type: incision  -LD    Row Name 03/20/19 0824          Coping    Observed Emotional State  accepting;cooperative  -LR     Verbalized Emotional State  acceptance  -LR     Row Name 03/20/19 0824          Plan of Care Review    Plan of Care Reviewed With  patient;significant other  -LR     Row Name 03/20/19 0824          Physical Therapy Clinical Impression    Date of Referral to PT  03/19/19  -LR     PT Diagnosis (PT Clinical Impression)  s/p elective ELVIRA via lateral approach  -LR     Prognosis (PT Clinical Impression)  good  -LR     Patient/Family Goals Statement (PT Clinical Impression)  go home, decrease pain  -LR     Criteria for Skilled Interventions Met (PT Clinical Impression)  yes;treatment indicated  -LR      Rehab Potential (PT Clinical Summary)  good, to achieve stated therapy goals  -LR     Care Plan Review (PT)  evaluation/treatment results reviewed;care plan/treatment goals reviewed;risks/benefits reviewed;patient/other agree to care plan;current/potential barriers reviewed  -LR     Care Plan Review, Other Participant (PT Clinical Impression)  significant other  -LR     Row Name 03/20/19 0824          Physical Therapy Goals    Bed Mobility Goal Selection (PT)  bed mobility, PT goal 1  -LR     Transfer Goal Selection (PT)  transfer, PT goal 1  -LR     Gait Training Goal Selection (PT)  gait training, PT goal 1  -LR     Stairs Goal Selection (PT)  stairs, PT goal 1  -LR     Additional Documentation  Stairs Goal Selection (PT) (Row)  -LR     Row Name 03/20/19 0824          Bed Mobility Goal 1 (PT)    Activity/Assistive Device (Bed Mobility Goal 1, PT)  sit to supine  -LR     [place] Level/Cues Needed (Bed Mobility Goal 1, PT)  conditional independence  -LR     Time Frame (Bed Mobility Goal 1, PT)  long term goal (LTG);1 day  -LR     Progress/Outcomes (Bed Mobility Goal 1, PT)  goal met  -LR     Row Name 03/20/19 0824          Transfer Goal 1 (PT)    Activity/Assistive Device (Transfer Goal 1, PT)  sit-to-stand/stand-to-sit;walker, rolling  -LR     [place] Level/Cues Needed (Transfer Goal 1, PT)  supervision required  -LR     Time Frame (Transfer Goal 1, PT)  long term goal (LTG);1 day  -LR     Progress/Outcome (Transfer Goal 1, PT)  goal met  -LR     Row Name 03/20/19 0824          Gait Training Goal 1 (PT)    Activity/Assistive Device (Gait Training Goal 1, PT)  gait (walking locomotion);walker, rolling  -LR     [place] Level (Gait Training Goal 1, PT)  conditional independence  -LR     Distance (Gait Goal 1, PT)  500 feet  -LR     Time Frame (Gait Training Goal 1, PT)  long term goal (LTG);1 day  -LR     Progress/Outcome (Gait Training Goal 1, PT)  goal not met;discharged from facility  -LR     Row  Name 03/20/19 0824          Stairs Goal 1 (PT)    Activity/Assistive Device (Stairs Goal 1, PT)  ascending stairs;descending stairs;using handrail, left;using handrail, right;step-to-step  -LR     [place] Level/Cues Needed (Stairs Goal 1, PT)  contact guard assist  -LR     Number of Stairs (Stairs Goal 1, PT)  3  -LR     Time Frame (Stairs Goal 1, PT)  long term goal (LTG);1 day  -LR     Progress/Outcome (Stairs Goal 1, PT)  goal met  -LR     Row Name 03/20/19 0824          Positioning and Restraints    Pre-Treatment Position  other (comment) ambulating in hallway with nursing  -LR     Post Treatment Position  bed  -LR     In Bed  notified nsg;supine;call light within reach;encouraged to call for assist;exit alarm on;with family/caregiver;side rails up x2  -LR     Row Name 03/20/19 0824          Physical Therapy Discharge Summary    Additional Documentation  Discharge Summary, PT Eval (Group)  -LR     Row Name 03/20/19 0824          Discharge Summary, PT Eval    Reason for Discharge (PT Discharge Summary)  patient discharged from this facility  -LR     Outcomes Achieved Upon Discharge (PT Discharge Summary)  patient able to partially achieve established goals;discharge from facility occurred on same date as evaluation  -LR     Transfer to Another Level of Care or Facility (PT Discharge Summary)  patient will continue therapy goals following discharge;patient to receive therapy via outpatient clinic  -LR     Row Name 03/20/19 0824          Living Environment    Home Accessibility  not wheelchair accessible;stairs to enter home;other (see comments) walk in shower  -LR       User Key  (r) = Recorded By, (t) = Taken By, (c) = Cosigned By    Initials Name Provider Type    LR Gale Miller, PT Physical Therapist    Rosa Elena Izaguirre RN Registered Nurse          Physical Therapy Education     Title: PT OT SLP Therapies (Done)     Topic: Physical Therapy (Done)     Point: Mobility training (Done)      Learning Progress Summary           Patient Acceptance, E,D,H, VU,DU by LR at 3/20/2019  8:24 AM    Comment:  Issued & reviewed written/illustrated HEP.Educated on lateral hip precautions,weight bearing status,correct sit to supine t/f technique,correct sit<->stand t/f technique,correct gait mechanics,correct stair training technique,& correct car t/f technique.   Significant Other Acceptance, E,D,H, VU,DU by LR at 3/20/2019  8:24 AM    Comment:  Issued & reviewed written/illustrated HEP.Educated on lateral hip precautions,weight bearing status,correct sit to supine t/f technique,correct sit<->stand t/f technique,correct gait mechanics,correct stair training technique,& correct car t/f technique.                   Point: Home exercise program (Done)     Learning Progress Summary           Patient Acceptance, E,D,H, VU,DU by LR at 3/20/2019  8:24 AM    Comment:  Issued & reviewed written/illustrated HEP.Educated on lateral hip precautions,weight bearing status,correct sit to supine t/f technique,correct sit<->stand t/f technique,correct gait mechanics,correct stair training technique,& correct car t/f technique.   Significant Other Acceptance, E,D,H, VU,DU by LR at 3/20/2019  8:24 AM    Comment:  Issued & reviewed written/illustrated HEP.Educated on lateral hip precautions,weight bearing status,correct sit to supine t/f technique,correct sit<->stand t/f technique,correct gait mechanics,correct stair training technique,& correct car t/f technique.                   Point: Body mechanics (Done)     Learning Progress Summary           Patient Acceptance, E,D,H, VU,DU by LR at 3/20/2019  8:24 AM    Comment:  Issued & reviewed written/illustrated HEP.Educated on lateral hip precautions,weight bearing status,correct sit to supine t/f technique,correct sit<->stand t/f technique,correct gait mechanics,correct stair training technique,& correct car t/f technique.   Significant Other Acceptance, E,D,H, VU,DU by LR at 3/20/2019   8:24 AM    Comment:  Issued & reviewed written/illustrated HEP.Educated on lateral hip precautions,weight bearing status,correct sit to supine t/f technique,correct sit<->stand t/f technique,correct gait mechanics,correct stair training technique,& correct car t/f technique.                   Point: Precautions (Done)     Learning Progress Summary           Patient Acceptance, E,D,H, VU,DU by LR at 3/20/2019  8:24 AM    Comment:  Issued & reviewed written/illustrated HEP.Educated on lateral hip precautions,weight bearing status,correct sit to supine t/f technique,correct sit<->stand t/f technique,correct gait mechanics,correct stair training technique,& correct car t/f technique.   Significant Other Acceptance, E,D,H, BISI,PALAK by LR at 3/20/2019  8:24 AM    Comment:  Issued & reviewed written/illustrated HEP.Educated on lateral hip precautions,weight bearing status,correct sit to supine t/f technique,correct sit<->stand t/f technique,correct gait mechanics,correct stair training technique,& correct car t/f technique.                               User Key     Initials Effective Dates Name Provider Type Discipline    GRACY 06/19/15 -  Gale Miller, PT Physical Therapist PT                PT Recommendation and Plan  Anticipated Discharge Disposition (PT): home with OP services, home with assist  Planned Therapy Interventions (PT Eval): balance training, bed mobility training, gait training, home exercise program, patient/family education, ROM (range of motion), stair training, strengthening, transfer training  Therapy Frequency (PT Clinical Impression): evaluation only(d/c home today)  Outcome Summary/Treatment Plan (PT)  Anticipated Equipment Needs at Discharge (PT): front wheeled walker  Anticipated Discharge Disposition (PT): home with OP services, home with assist  Reason for Discharge (PT Discharge Summary): patient discharged from this facility  Plan of Care Reviewed With: patient, significant  other  Progress: improving  Outcome Summary: Patient ambulated 400 feet with RW and step through gait pattern, limited by pain. Patient climbed 5 steps with 2 handrails with no difficulty. Patient has been d/c home with family and outpatient PT today.     Outcome Measures     Row Name 03/20/19 0824             How much help from another person do you currently need...    Turning from your back to your side while in flat bed without using bedrails?  4  -LR      Moving from lying on back to sitting on the side of a flat bed without bedrails?  4  -LR      Moving to and from a bed to a chair (including a wheelchair)?  3  -LR      Standing up from a chair using your arms (e.g., wheelchair, bedside chair)?  3  -LR      Climbing 3-5 steps with a railing?  3  -LR      To walk in hospital room?  3  -LR      AM-PAC 6 Clicks Score  20  -LR         Functional Assessment    Outcome Measure Options  AM-PAC 6 Clicks Basic Mobility (PT)  -LR        User Key  (r) = Recorded By, (t) = Taken By, (c) = Cosigned By    Initials Name Provider Type    LR Gale Miller, PT Physical Therapist           Time Calculation:   PT Charges     Row Name 03/20/19 0824             Time Calculation    Start Time  0824  -LR      PT Received On  03/20/19  -LR      PT Goal Re-Cert Due Date  03/30/19  -LR         Time Calculation- PT    Total Timed Code Minutes- PT  23 minute(s)  -LR         Timed Charges    07953 - PT Therapeutic Exercise Minutes  11  -LR      56291 - Gait Training Minutes   12  -LR        User Key  (r) = Recorded By, (t) = Taken By, (c) = Cosigned By    Initials Name Provider Type    LR Gale Miller, PT Physical Therapist        Therapy Charges for Today     Code Description Service Date Service Provider Modifiers Qty    04051414028 HC PT THER PROC EA 15 MIN 3/20/2019 Gale Miller, PT GP 1    88555684417 HC GAIT TRAINING EA 15 MIN 3/20/2019 Gale Miller, PT GP 1    43665491683 HC PT EVAL LOW  COMPLEXITY 3 3/20/2019 Gale Miller, PT GP 1          PT G-Codes  Outcome Measure Options: AM-PAC 6 Clicks Basic Mobility (PT)  AM-PAC 6 Clicks Score: 20    PT Discharge Summary  Anticipated Discharge Disposition (PT): home with OP services, home with assist  Reason for Discharge: Discharge from facility  Outcomes Achieved: Patient able to partially acheive established goals, Discharge from facility occurred on same date as evluation  Discharge Destination: Home with assist, Home with outpatient services    Gale Miller, PT  3/20/2019

## 2019-03-20 NOTE — PLAN OF CARE
Problem: Patient Care Overview  Goal: Plan of Care Review  Outcome: Ongoing (interventions implemented as appropriate)   03/20/19 0824   Coping/Psychosocial   Plan of Care Reviewed With patient;significant other   Plan of Care Review   Progress improving   OTHER   Outcome Summary Patient ambulated 400 feet with RW and step through gait pattern, limited by pain. Patient climbed 5 steps with 2 handrails with no difficulty. Patient has been d/c home with family and outpatient PT today.        Problem: Hip Arthroplasty (Total, Partial) (Adult)  Goal: Signs and Symptoms of Listed Potential Problems Will be Absent, Minimized or Managed (Hip Arthroplasty)  Outcome: Ongoing (interventions implemented as appropriate)   03/20/19 0824   Goal/Outcome Evaluation   Problems Assessed (Hip Arthroplasty) functional deficit;joint dislocation;pain   Problems Present (Hip Arthroplasty) functional deficit;pain;joint dislocation

## 2019-03-20 NOTE — PLAN OF CARE
Problem: Patient Care Overview  Goal: Plan of Care Review  Outcome: Ongoing (interventions implemented as appropriate)   03/20/19 0869   Coping/Psychosocial   Plan of Care Reviewed With patient;significant other   OTHER   Outcome Summary OT IE completed. No POC established as pt set to d/c home today with SO and out pt PT referral. No BR DME needs. Education completed for surgical precautions with ADLs and use of AE with good teach back. SBA bed mobility with leg . SBA STS and ambulation. CGA commode transfer. OT to d/c at this time.

## 2019-03-20 NOTE — DISCHARGE INSTR - APPOINTMENTS
You have been scheduled for physical therapy with Freda MICHEL for 3/21/2019 at 3:00 PM.  Please keep your scheduled appointment for therapy.  The office ask that you arrive at 2:45 PM to fill out new patient paperwork.  You can call the office at [phone] for questions or if you need to change the times of your therapy.

## 2019-03-20 NOTE — THERAPY DISCHARGE NOTE
"Acute Care - Occupational Therapy Initial Eval/Discharge  [facility]     Patient Name: Patricio Iverson  : 1965  MRN: [id number]  Today's Date: 3/20/2019  Onset of Illness/Injury or Date of Surgery: 19  Date of Referral to OT: 19  Referring Physician: Santos      Admit Date: 3/19/2019       ICD-10-CM ICD-9-CM   1. Impaired functional mobility, balance, gait, and endurance Z74.09 V49.89   2. Primary osteoarthritis of right hip M16.11 715.15   3. Impaired mobility and ADLs Z74.09 799.89     Patient Active Problem List   Diagnosis   • Primary osteoarthritis of right hip   • HTN (hypertension)   • HLD (hyperlipidemia)   • FAIZAN on CPAP   • Chronic pain   • Chronic narcotic use     Past Medical History:   Diagnosis Date   • Abdominal hernia    • Arthritis    • GERD (gastroesophageal reflux disease)    • Headache    • [place] (hard of hearing)    • Hyperlipidemia    • Hypertriglyceridemia    • Migraine    • Seasonal allergies    • Sleep apnea with use of continuous positive airway pressure (CPAP)     compliant with machine    • SOBOE (shortness of breath on exertion)    • Wears glasses    • Wears partial dentures     \"dental implant\"  tops and most bottom - crowns and implants      Past Surgical History:   Procedure Laterality Date   • COLONOSCOPY     • DENTAL PROCEDURE  2018    Dental Implants   • ENDOSCOPY     • EPIDURAL      injection in shoulder    • HERNIA REPAIR      umbilical hernia on left side    • KNEE SURGERY Right     scope   • SHOULDER SURGERY Right     labrium and rotator cuff- couple times    • TONSILLECTOMY     • WISDOM TOOTH EXTRACTION      all 4 removed    • WRIST SURGERY Right           OT ASSESSMENT FLOWSHEET (last 72 hours)      Occupational Therapy Evaluation     Row Name 19 0858                   OT Evaluation Time/Intention    Subjective Information  no complaints  -TB        Document Type  evaluation;discharge evaluation/summary  -TB        Mode of Treatment  " occupational therapy  -TB        Patient Effort  good  -TB        Symptoms Noted During/After Treatment  none  -TB        Comment  Mild impulsivity   -TB           General Information    Patient Profile Reviewed?  yes  -TB        Onset of Illness/Injury or Date of Surgery  03/19/19  -TB        Referring Physician  Santos  -TB        Patient Observations  alert;cooperative  -TB        Patient/Family Observations  SO present  -TB        General Observations of Patient  Pt in bed, RA  -TB        Prior Level of Function  min assist:;ADL's  -TB        Equipment Currently Used at Home  cane, straight;commode, 3-in-1;shower chair;sock aid reacher  -TB        Pertinent History of Current Functional Problem  Pt admitted for surgical mgmt of persistent/progressive R hip pain/dysfunction that failed to improve with conservative treatment  -TB        Existing Precautions/Restrictions  fall;right;hip;other (see comments) Lateral approach  -TB        Risks Reviewed  patient:;spouse/S.O.:;LOB;increased discomfort;increased drainage;lines disloged  -TB        Benefits Reviewed  patient:;spouse/S.O.:;improve function;decrease pain;decrease risk of DVT;increase knowledge  -TB        Barriers to Rehab  previous functional deficit  -TB           Relationship/Environment    Primary Source of Support/Comfort  significant other  -TB        Lives With  significant other  -TB        Family Caregiver if Needed  significant other  -TB        Primary Roles/Responsibilities  wage earner, full time  -TB           Resource/Environmental Concerns    Current Living Arrangements  home/apartment/condo  -TB           Cognitive Assessment/Intervention- PT/OT    Orientation Status (Cognition)  oriented x 4  -TB        Follows Commands (Cognition)  WFL  -TB        Safety Deficit (Cognitive)  mild deficit;safety precautions awareness;safety precautions follow-through/compliance;impulsivity;awareness of need for assistance mild impulsivity noted  -TB            Safety Issues, Functional Mobility    Safety Issues Affecting Function (Mobility)  awareness of need for assistance;safety precaution awareness;safety precautions follow-through/compliance  -TB        Impairments Affecting Function (Mobility)  balance;pain;range of motion (ROM);strength  -TB           Mobility Assessment/Treatment    Extremity Weight-bearing Status  right lower extremity  -TB        Right Lower Extremity (Weight-bearing Status)  weight-bearing as tolerated (WBAT)  -TB           Bed Mobility Assessment/Treatment    Bed Mobility Assessment/Treatment  supine-sit;sit-supine  -TB        Supine-Sit [place] (Bed Mobility)  set up;verbal cues  -TB        Sit-Supine [place] (Bed Mobility)  set up;verbal cues  -TB        Bed Mobility, Safety Issues  decreased use of legs for bridging/pushing  -TB        Assistive Device (Bed Mobility)  leg   -TB        Comment (Bed Mobility)  AE issued and teaching completed with good teach back  -TB           Functional Mobility    Functional Mobility- Ind. Level  standby assist  -TB        Functional Mobility- Device  rolling walker  -TB        Functional Mobility-Distance (Feet)  20  -TB        Functional Mobility- Safety Issues  balance decreased during turns  -TB        Functional Mobility- Comment  decreased RW safety, education reinforced for home safety/fall prevention and safe positioning of RW during ADL transfers/activities  (Significant)   -TB           Transfer Assessment/Treatment    Transfer Assessment/Treatment  sit-stand transfer;stand-sit transfer;toilet transfer  -TB        Comment (Transfers)  cues for hand placement, RW positioning and safety  -TB           Sit-Stand Transfer    Sit-Stand [place] (Transfers)  stand by assist;verbal cues  -TB        Assistive Device (Sit-Stand Transfers)  walker, front-wheeled  -TB           Stand-Sit Transfer    Stand-Sit [place] (Transfers)  stand by assist;verbal cues  -TB        Assistive  Device (Stand-Sit Transfers)  walker, front-wheeled  -TB           Toilet Transfer    Type (Toilet Transfer)  sit-stand;stand-sit  -TB        [place] Level (Toilet Transfer)  contact guard;verbal cues  -TB        Assistive Device (Toilet Transfer)  raised toilet seat;grab bars/safety frame  -TB           ADL Assessment/Intervention    20223 - OT Self Care/Mgmt Minutes  20  -TB        BADL Assessment/Intervention  bathing;lower body dressing;toileting;grooming  -TB           Bathing Assessment/Intervention    Bathing [place] Level  lower body;set up;verbal cues  -TB        Assistive Devices (Bathing)  long-handled sponge  -TB        Bathing Position  edge of bed sitting  -TB        Comment (Bathing)  AE education completed with good teach back to maintain sx precautions and maximize ADL independence  -TB           Lower Body Dressing Assessment/Training    Lower Body Dressing [place] Level  doff;don;pants/bottoms;shoes/slippers;socks;set up;verbal cues  -TB        Assistive Devices (Lower Body Dressing)  long-handled shoe horn;reacher;sock-aid  -TB        Lower Body Dressing Position  edge of bed sitting  -TB        Comment (Lower Body Dressing)  AE education completed with good teach back to maintain sx precautions and maximize ADL independence  -TB           Grooming Assessment/Training    [place] Level (Grooming)  supervision  -TB        Comment (Grooming)  Education for RW positioning at sink for safety/fall prevention  -TB           Toileting Assessment/Training    [place] Level (Toileting)  toileting skills;adjust/manage clothing;conditional independence  -TB        Assistive Devices (Toileting)  grab bar/safety frame;raised toilet seat  -TB        Toileting Position  supported sitting  -TB           BADL Safety/Performance    Impairments, BADL Safety/Performance  balance;pain;range of motion;strength  -TB        Skilled BADL Treatment/Intervention  BADL process/adaptation  training;adaptive equipment training  -TB           General ROM    GENERAL ROM COMMENTS  B UE intact; recent R shoulder injection - no deficits noted  -TB           MMT (Manual Muscle Testing)    General MMT Comments  B UE intact, functionally 5/5  -TB           Motor Assessment/Interventions    Additional Documentation  Balance (Group);Therapeutic Exercise (Group)  -TB           Therapeutic Exercise    Therapeutic Exercise  seated, upper extremities  -TB        Additional Documentation  Therapeutic Exercise (Row)  -TB           Upper Extremity Seated Therapeutic Exercise    Performed, Seated Upper Extremity (Therapeutic Exercise)  shoulder flexion/extension;shoulder abduction/adduction;shoulder horizontal abduction/adduction;elbow flexion/extension;forearm supination/pronation;wrist flexion/extension;digit flexion/extension  -TB        Exercise Type, Seated Upper Extremity (Therapeutic Exercise)  AROM (active range of motion)  -TB        Restrictions, Seated Upper Extremity (Therapeutic Exercise)  Pt report recent R shoulder injection - denies pain - AROM WFL  -TB        Comment, Seated Upper Extremity (Therapeutic Exercise)  Education completed for benefits of OOB activity/therapy, role of OT, transfer training, home safety/fall prevention, surgical precautions with ADLs and use of AE to maximize ADL independence.  -TB           Balance    Balance  dynamic sitting balance;dynamic standing balance  -TB           Dynamic Sitting Balance    Level of [place], Reaches Outside Midline (Sitting, Dynamic Balance)  supervision  -TB        Sitting Position, Reaches Outside Midline (Sitting, Dynamic Balance)  sitting on edge of bed commode  -TB        Comment, Reaches Outside Midline (Sitting, Dynamic Balance)  ADL tasks, AE teaching  -TB           Dynamic Standing Balance    Level of [place], Reaches Outside Midline (Standing, Dynamic Balance)  standby assist  -TB        Time Able to Maintain Position, Reaches  Outside Midline (Standing, Dynamic Balance)  2 to 3 minutes  -TB        Assistive Device Utilized (Supported Standing, Dynamic Balance)  walker, rolling  -TB           Sensory Assessment/Intervention    Sensory General Assessment  no sensation deficits identified B UE intact  -TB           Vision Assessment/Intervention    Visual Impairment/Limitations  WFL  -TB           Positioning and Restraints    Pre-Treatment Position  in bed  -TB        Post Treatment Position  bathroom  -TB        Bathroom  with family/caregiver;call light within reach;encouraged to call for assist  -TB           Pain Assessment    Additional Documentation  Pain Scale: Numbers Pre/Post-Treatment (Group)  -TB           Pain Scale: Numbers Pre/Post-Treatment    Pain Scale: Numbers, Pretreatment  7/10  -TB        Pain Scale: Numbers, Post-Treatment  8/10  -TB        Pain Location - Side  Right  -TB        Pain Location  hip  -TB        Pre/Post Treatment Pain Comment  Pt tolerates mobility/activity well  -TB        Pain Intervention(s)  Ambulation/increased activity;Repositioned  -TB           Wound 03/19/19 1420 Right hip incision    Wound - Properties Group Date first assessed: 03/19/19  -LD Time first assessed: 1420  -LD Side: Right  -LD Location: hip  -LD Type: incision  -LD       Coping    Observed Emotional State  accepting;cooperative  -TB        Verbalized Emotional State  acceptance  -TB           Plan of Care Review    Plan of Care Reviewed With  patient;significant other  -TB           Clinical Impression (OT)    Date of Referral to OT  03/19/19  -TB        OT Diagnosis  Impaired mobility and ADL  -TB        Patient/Family Goals Statement (OT Eval)  to return home today  -TB        Therapy Frequency (OT Eval)  evaluation only  -TB        Care Plan Review (OT)  evaluation/treatment results reviewed;patient/other agree to care plan  -TB        Care Plan Review, Other Participant (OT Eval)  significant other  -TB        Anticipated  Equipment Needs at Discharge (OT)  -- None  -TB        Anticipated Discharge Disposition (OT)  home with assist  -TB           Vital Signs    Pre Systolic BP Rehab  -- RN cleared OT  -TB        O2 Delivery Post Treatment  room air  -TB        Pre Patient Position  Supine  -TB        Intra Patient Position  Standing  -TB        Post Patient Position  Sitting  -TB           Discharge Summary (Occupational Therapy)    Additional Documentation  Discharge Summary, OT Eval (Group)  -TB           Discharge Summary, OT Eval    Reason for Discharge (OT Discharge Summary)  patient discharged from this facility  -TB           Living Environment    Home Accessibility  wheelchair accessible;stairs to enter home walk in shower with seat; 3-in-1 commode  -TB          User Key  (r) = Recorded By, (t) = Taken By, (c) = Cosigned By    Initials Name Effective Dates    TB Sharon Hanson OT 06/08/18 -     Rosa Elena Izaguirre RN 06/16/16 -           Occupational Therapy Education     Title: PT OT SLP Therapies (Done)     Topic: Occupational Therapy (Done)     Point: ADL training (Done)     Description: Instruct learner(s) on proper safety adaptation and remediation techniques during self care or transfers.   Instruct in proper use of assistive devices.    Learning Progress Summary           Patient Acceptance, E,D,TB, BISI,DU,NR by TB at 3/20/2019  9:45 AM    Comment:  Education completed for benefits of OOB activity/therapy, role of OT, transfer training, home safety/fall prevention, surgical precautions with ADLs and use of AE to maximize ADL independence.                   Point: Precautions (Done)     Description: Instruct learner(s) on prescribed precautions during self-care and functional transfers.    Learning Progress Summary           Patient Acceptance, E,D,TB, BIIS,DU,NR by TB at 3/20/2019  9:45 AM    Comment:  Education completed for benefits of OOB activity/therapy, role of OT, transfer training, home safety/fall  prevention, surgical precautions with ADLs and use of AE to maximize ADL independence.                               User Key     Initials Effective Dates Name Provider Type Discipline     06/08/18 -  Sharon Hanson, OT Occupational Therapist OT                OT Recommendation and Plan  Outcome Summary/Treatment Plan (OT)  Anticipated Equipment Needs at Discharge (OT): (None)  Anticipated Discharge Disposition (OT): home with assist  Reason for Discharge (OT Discharge Summary): patient discharged from this facility  Therapy Frequency (OT Eval): evaluation only  Plan of Care Review  Plan of Care Reviewed With: patient, significant other  Plan of Care Reviewed With: patient, significant other  Outcome Summary: OT IE completed. No POC established as pt set to d/c home today with SO and out pt PT referral. No BR DME needs. Education completed for surgical precautions with ADLs and use of AE with good teach back.  SBA bed mobility with leg . SBA STS and ambulation. CGA commode transfer. OT to d/c at this time.      Rehab Goal Summary     Row Name 03/20/19 0824             Physical Therapy Goals    Bed Mobility Goal Selection (PT)  bed mobility, PT goal 1  -LR      Transfer Goal Selection (PT)  transfer, PT goal 1  -LR      Gait Training Goal Selection (PT)  gait training, PT goal 1  -LR      Stairs Goal Selection (PT)  stairs, PT goal 1  -LR         Bed Mobility Goal 1 (PT)    Activity/Assistive Device (Bed Mobility Goal 1, PT)  sit to supine  -LR      [place] Level/Cues Needed (Bed Mobility Goal 1, PT)  conditional independence  -LR      Time Frame (Bed Mobility Goal 1, PT)  long term goal (LTG);1 day  -LR      Progress/Outcomes (Bed Mobility Goal 1, PT)  goal met  -LR         Transfer Goal 1 (PT)    Activity/Assistive Device (Transfer Goal 1, PT)  sit-to-stand/stand-to-sit;walker, rolling  -LR      [place] Level/Cues Needed (Transfer Goal 1, PT)  supervision required  -LR      Time Frame  (Transfer Goal 1, PT)  long term goal (LTG);1 day  -LR      Progress/Outcome (Transfer Goal 1, PT)  goal met  -LR         Gait Training Goal 1 (PT)    Activity/Assistive Device (Gait Training Goal 1, PT)  gait (walking locomotion);walker, rolling  -LR      [place] Level (Gait Training Goal 1, PT)  conditional independence  -LR      Distance (Gait Goal 1, PT)  500 feet  -LR      Time Frame (Gait Training Goal 1, PT)  long term goal (LTG);1 day  -LR      Progress/Outcome (Gait Training Goal 1, PT)  goal not met;discharged from facility  -LR         Stairs Goal 1 (PT)    Activity/Assistive Device (Stairs Goal 1, PT)  ascending stairs;descending stairs;using handrail, left;using handrail, right;step-to-step  -LR      [place] Level/Cues Needed (Stairs Goal 1, PT)  contact guard assist  -LR      Number of Stairs (Stairs Goal 1, PT)  3  -LR      Time Frame (Stairs Goal 1, PT)  long term goal (LTG);1 day  -LR      Progress/Outcome (Stairs Goal 1, PT)  goal met  -LR        User Key  (r) = Recorded By, (t) = Taken By, (c) = Cosigned By    Initials Name Provider Type Discipline    LR Gale Miller, PT Physical Therapist PT          Outcome Measures     Row Name 03/20/19 0858 03/20/19 0824          How much help from another person do you currently need...    Turning from your back to your side while in flat bed without using bedrails?  --  4  -LR     Moving from lying on back to sitting on the side of a flat bed without bedrails?  --  4  -LR     Moving to and from a bed to a chair (including a wheelchair)?  --  3  -LR     Standing up from a chair using your arms (e.g., wheelchair, bedside chair)?  --  3  -LR     Climbing 3-5 steps with a railing?  --  3  -LR     To walk in hospital room?  --  3  -LR     AM-PAC 6 Clicks Score  --  20  -LR        How much help from another is currently needed...    Putting on and taking off regular lower body clothing?  3  -TB  --     Bathing (including washing, rinsing, and  drying)  3  -TB  --     Toileting (which includes using toilet bed pan or urinal)  3  -TB  --     Putting on and taking off regular upper body clothing  3  -TB  --     Taking care of personal grooming (such as brushing teeth)  3  -TB  --     Eating meals  4  -TB  --     Score  19  -TB  --        Functional Assessment    Outcome Measure Options  AM-PAC 6 Clicks Daily Activity (OT)  -TB  AM-PAC 6 Clicks Basic Mobility (PT)  -LR       User Key  (r) = Recorded By, (t) = Taken By, (c) = Cosigned By    Initials Name Provider Type    TB Sharon Hanson OT Occupational Therapist    LR Gale Miller, PT Physical Therapist          Time Calculation:   Time Calculation- OT     Row Name 03/20/19 0948 03/20/19 0858 03/20/19 0824       Time Calculation- OT    OT Start Time  0858  -TB  --  --    OT Received On  03/20/19  -TB  --  --    OT Goal Re-Cert Due Date  03/30/19  -TB  --  --       Timed Charges    53997 - Gait Training Minutes   --  --  12  -LR    85036 - OT Self Care/Mgmt Minutes  --  20  -TB  --      User Key  (r) = Recorded By, (t) = Taken By, (c) = Cosigned By    Initials Name Provider Type    TB Sharon Hanson, OT Occupational Therapist    LR Gale Miller, PT Physical Therapist        Therapy Suggested Charges     Code   Minutes Charges    56815 (CPT®) Hc Ot Neuromusc Re Education Ea 15 Min      26286 (CPT®) Hc Ot Ther Proc Ea 15 Min      18749 (CPT®) Hc Ot Therapeutic Act Ea 15 Min      13052 (CPT®) Hc Ot Manual Therapy Ea 15 Min      17267 (CPT®) Hc Ot Iontophoresis Ea 15 Min      59401 (CPT®) Hc Ot Elec Stim Ea-Per 15 Min      55989 (CPT®) Hc Ot Ultrasound Ea 15 Min      50081 (CPT®) Hc Ot Self Care/Mgmt/Train Ea 15 Min 20 1    Total  20 1        Therapy Charges for Today     Code Description Service Date Service Provider Modifiers Qty    61186500240 HC OT SELF CARE/MGMT/TRAIN EA 15 MIN 3/20/2019 Sharon Hanson OT GO 1    55324857682 HC OT EVAL MOD COMPLEXITY 3 3/20/2019  Margie, Sharon Babcock, OT GO 1               OT Discharge Summary  Anticipated Discharge Disposition (OT): home with assist    Sharon Hanson, OT  3/20/2019

## 2019-03-20 NOTE — DISCHARGE PLACEMENT REQUEST
"Gabriela Woods  [phone]  Order for outpatient PT    Patricio Garza (53 y.o. Male)     Date of Birth Social Security Number Address Home Phone MRN    1965  [address] [id number] [phone] [id number]    [employer] Marital Status          Non-[place]atsdin Single       Admission Date Admission Type Admitting Provider Attending Provider Department, Room/Bed    3/19/19 Elective Anil Graham MD Kirk, Michael E, MD [address], S368/1    Discharge Date Discharge Disposition Discharge Destination         Home or Self Care              Attending Provider:  Anil Graham MD    Allergies:  Keflex [Cephalexin]    Isolation:  None   Infection:  None   Code Status:  CPR    Ht:  177.8 cm (70\")   Wt:  114 kg (252 lb 3.3 oz)    Admission Cmt:  None   Principal Problem:  Status post total hip replacement, right [Z96.641]                 Active Insurance as of 3/19/2019     Primary Coverage     Payor Plan Insurance Group Employer/Plan Group    LILI Attender LILI BLUE [employer] BLUE SHIELD PPO [id number]     Payor Plan Address Payor Plan Phone Number Payor Plan Fax Number Effective Dates    [address] [phone]  2019 - None Entered    [address] [address]       Subscriber Name Subscriber Birth Date Member ID       PATRICIO GARZA 1965 [id number]                 Emergency Contacts      (Rel.) Home Phone Work Phone Mobile Phone    Salina Pascual (Significant Other) [phone] -- [phone]        [address] [address]  Phone:  [phone]  Fax:   Date: Mar 20, 2019      Ambulatory Referral to Physical Therapy POST OP     Patient:  Patricio Garza MRN:  [id number]   [address] [facility] [place] [address] :  1965  SSN:    Phone: [phone] Sex:  M      INSURANCE PAYOR PLAN GROUP # SUBSCRIBER ID   Primary:    LILI HYATT CROSS [id number] " [id number]      Referring Provider Information:  ANIL GRAHAM Phone: [phone] Fax:       Referral Information:   # Visits:  1 Referral Type: Therapy [AE1]   Urgency:  Routine Referral Reason: Specialty Services Required   Start Date: Mar 20, 2019 End Date:  To be determined by Insurer   Diagnosis: Primary osteoarthritis of right hip (M16.11 [ICD-10-CM] 715.15 [ICD-9-CM])  Impaired functional mobility, balance, gait, and endurance (Z74.09 [ICD-10-CM] V49.89 [ICD-9-CM])  Impaired mobility and ADLs (Z74.09 [ICD-10-CM] 799.89 [ICD-9-CM])  Status post total hip replacement, right (Z96.641 [ICD-10-CM] V43.64 [ICD-9-CM])      Refer to Dept:   Refer to Provider:   Refer to Facility:       Specialty needed: POST OP     This document serves as a request of services and does not constitute Insurance authorization or approval of services.  To determine eligibility, please contact the members Insurance carrier to verify and review coverage.     If you have medical questions regarding this request for services. Please contact [address] at [phone] between the hours of 8:00am - 5:00pm (Mon-Fri).       Verbal Order Mode: Telephone with readback   Authorizing Provider: Anil Graham MD  Authorizing Provider's NPI: [id number]     Order Entered By: Gabriela Woods RN 3/20/2019 12:18 PM                    History & Physical      Geraldo Ashley MD at 3/19/2019  1:44 PM          Patient Name: Patricio Iverson  MRN: [id number]  : 1965  DOS: 3/19/2019    Attending: Anil Graham MD    Primary Care Provider: Sarbjit Rebolledo MD      Chief complaint:  Right hip pain    Subjective   Patient is a 53 y.o. male presented for scheduled surgery by Dr. Graham.  He anticipates a right total hip replacement today.  He has hip has been painful for many years.  He is a cane for ambulation.    When seen preop he is doing well.  He denies pain or other complaints. He denies nausea, shortness  of breath or chest pain. No hx of DVT or PE.    He has had chronic back pain for many years and chronic narcotic use managed by PCP.    (Above is noted, agree.  I saw Mr. Iverson in PACU after surgery.  He underwent right total hip arthroplasty under spinal anesthesia, he tolerated surgery well and is being admitted for further management.  His pain is under good control after surgery.  No nausea or vomiting, no shortness of breath or chest pain.)  [place]    Allergies:  Allergies   Allergen Reactions   • Keflex [Cephalexin] Hives       Meds:  Medications Prior to Admission   Medication Sig Dispense Refill Last Dose   • ALLERGY SERUM INJECTION Inject  under the skin into the appropriate area as directed 1 (One) Time.      • ALPRAZolam (XANAX) 0.5 MG tablet Take 0.5 mg by mouth 3 (Three) Times a Day As Needed.  2 3/19/2019   • amLODIPine (NORVASC) 5 MG tablet Take 5 mg by mouth Daily.  3 3/19/2019 at       • atorvastatin (LIPITOR) 40 MG tablet Take 40 mg by mouth Daily.  3 3/18/2019   • cetirizine (zyrTEC) 10 MG tablet Take 10 mg by mouth Daily.   3/19/2019   • desloratadine (CLARINEX) 5 MG tablet TAKE ONE TABLET BY MOUTH EVERY DAY AT BEDTIME AS DIRECTED  6 3/18/2019   • fenofibrate 160 MG tablet Take 160 mg by mouth Every Night.  3 3/18/2019   • gabapentin (NEURONTIN) 800 MG tablet Take 400 mg by mouth 2 (Two) Times a Day.  4 3/18/2019   • Ginkgo Biloba (GINKOBA PO) Take 1 capsule by mouth Daily.   3/18/2019   • HYDROcodone-acetaminophen (NORCO) 7.5-325 MG per tablet Take 1 tablet by mouth 3 (Three) Times a Day As Needed for Moderate Pain .  0 3/19/2019 at 0830   • ipratropium (ATROVENT) 0.06 % nasal spray 1 spray into the nostril(s) as directed by provider 2 (Two) Times a Day As Needed for Rhinitis.   Past Month   • lisinopril (PRINIVIL,ZESTRIL) 20 MG tablet Take 20 mg by mouth Daily.  3 3/18/2019   • loratadine (CLARITIN) 10 MG tablet Take 10 mg by mouth Daily.   3/19/2019   • Melatonin 10 MG capsule Take 2 capsules by  "mouth Every Night.   3/18/2019   • montelukast (SINGULAIR) 10 MG tablet Take 10 mg by mouth Every Night.  5 3/18/2019   • Multiple Vitamins-Minerals (MULTIVITAMIN ADULT PO) Take 1 capsule by mouth Daily.   3/18/2019   • omeprazole (priLOSEC) 20 MG capsule Take 20 mg by mouth Daily.   3/19/2019   • sildenafil (REVATIO) 20 MG tablet TAKE UP TO 3 TABLETS BY MOUTH EVERY DAY AS NEEDED  5 Past Month   • diclofenac (VOLTAREN) 75 MG EC tablet Take 75 mg by mouth Daily.  11 3/12/2019   • glucosamine-chondroitin 500-400 MG capsule capsule Take 1 capsule by mouth 2 (Two) Times a Day With Meals.   3/12/2019   • Omega-3 Fatty Acids (FISH OIL) 1200 MG capsule delayed-release Take 1 capsule by mouth 2 (Two) Times a Day. 1290   3/12/2019   • Testosterone Cypionate (DEPOTESTOTERONE CYPIONATE) 200 MG/ML injection INJECT 1 ML IM EVERY 14 DAYS-  1 3/13/2019       History:   Past Medical History:   Diagnosis Date   • Abdominal hernia    • Arthritis    • GERD (gastroesophageal reflux disease)    • Headache    • [place] (hard of hearing)    • Hyperlipidemia    • Hypertriglyceridemia    • Migraine    • Seasonal allergies    • Sleep apnea with use of continuous positive airway pressure (CPAP)     compliant with machine    • SOBOE (shortness of breath on exertion)    • Wears glasses    • Wears partial dentures     \"dental implant\"  tops and most bottom - crowns and implants      Past Surgical History:   Procedure Laterality Date   • COLONOSCOPY     • DENTAL PROCEDURE  08/2018    Dental Implants   • ENDOSCOPY     • EPIDURAL      injection in shoulder    • HERNIA REPAIR      umbilical hernia on left side    • KNEE SURGERY Right 2010    scope   • SHOULDER SURGERY Right 2010    labrium and rotator cuff- couple times    • TONSILLECTOMY     • WISDOM TOOTH EXTRACTION      all 4 removed    • WRIST SURGERY Right      Family History   Problem Relation Age of Onset   • No Known Problems Mother    • Cancer Father      Social History     Tobacco Use   • " Smoking status: Never Smoker   • Smokeless tobacco: Never Used   Substance Use Topics   • Alcohol use: Yes     Alcohol/week: 8.4 oz     Types: 7 Cans of beer, 7 Shots of liquor per week   • Drug use: No   Lives with his significant other.  He has 2 children.  Works in maintenance.    Review of Systems  All systems were reviewed and negative except for:  Respiratory: positive for  BATEMAN    Vital Signs  Visit Vitals  /89 (BP Location: Right arm, Patient Position: Lying)   Pulse 75   Temp 98.4 °F (36.9 °C) (Temporal)   Resp 18   SpO2 96%       Physical Exam:    General Appearance:    Alert, cooperative, in no acute distress   Head:    Normocephalic, without obvious abnormality, atraumatic   Eyes:            Lids and lashes normal, conjunctivae and sclerae normal, no   icterus, no pallor, corneas clear   Ears:    Ears appear intact with no abnormalities noted   Neck:   No adenopathy, supple, trachea midline, no thyromegaly   Lungs:     Clear to auscultation,respirations regular, even and                   unlabored    Heart:    Regular rhythm and normal rate, normal S1 and S2, no            murmur, no gallop   Abdomen:     Normal bowel sounds, no masses, no organomegaly, soft        non-tender, non-distended, no guarding, no rebound                 tenderness   Genitalia:    Deferred   Extremities:   Moves all extremities well, no edema, no cyanosis, no              Redness  (Postoperative exam: Right hip with clean dry and intact dressing on.  Lower extremities with intact motor and sensory function distally.  Normal cap refill and pulses.)  [place]   Pulses:   Pulses palpable and equal bilaterally   Skin:   No bleeding, bruising or rash   Neurologic:   Cranial nerves 2 - 12 grossly intact, sensation intact      I reviewed the patient's new clinical results.     Results for RAMIRO GARZA (MRN [id number]) as of 3/19/2019 13:49   Ref. Range 3/7/2019 11:25   C-Reactive Protein Latest Ref Range: 0.00 - 1.00 mg/dL  0.07   Protime Latest Ref Range: 11.2 - 14.3 Seconds 12.6   INR Latest Ref Range: 0.85 - 1.16  0.99   PTT Latest Ref Range: 24.0 - 37.0 seconds 29.7   Sed Rate Latest Ref Range: 0 - 20 mm/hr 8     Lab Results   Component Value Date    HGBA1C 5.50 03/07/2019       Assessment and Plan:     Status post right total hip arthroplasty.    Primary osteoarthritis of right hip    HTN (hypertension)    HLD (hyperlipidemia)    FAIZAN on CPAP    Chronic pain    Chronic narcotic use      Plan  1. PT/OT- early ambulation post op  2. Pain control-prns   3. IS-encourage  4. DVT proph- mechs/ASA  5. Bowel regimen  6. Resume home medications as appropriate  7. Monitor post-op labs  8. DC planning for home    HTN, HLD  - Continue home norvasc, statin and lisinopril  - Monitor BP   - Holding parameters for BP meds  - Labetalol PRN for SBP>170    FAIZAN  - CPAP at night      ALLYSON Souza  03/19/19  1:49 PM     I have personally performed the evaluation on this patient. My history is consistent  with HPI obtained. My exam findings are listed above. I have personally reviewed and discussed the above formulated treatment plan with pt and [email or web address].      Electronically signed by Geraldo Ashley MD at 3/19/2019  5:39 PM     Anil Graham MD at 3/19/2019 12:06 PM          Pre-Op H&P  Patricio Iverson  [id number]  1965    Chief complaint: Right hip pain    HPI:    Patient is a 53 y.o.male who presents with right hip pain and found to have end stage right hip arthritis.  Here today for right total hip arthroplasty.  Recent right shoulder steroid injection 3/7/19    Review of Systems:  General ROS: negative for chills, fever or skin lesions;  No changes since last office visit  Cardiovascular ROS: no chest pain or dyspnea on exertion  Respiratory ROS: no cough, shortness of breath, or wheezing    Allergies:   Allergies   Allergen Reactions   • Keflex [Cephalexin] Hives       Home Meds:    No current facility-administered  medications on file prior to encounter.      Current Outpatient Medications on File Prior to Encounter   Medication Sig Dispense Refill   • ALLERGY SERUM INJECTION Inject  under the skin into the appropriate area as directed 1 (One) Time.     • ALPRAZolam (XANAX) 0.5 MG tablet Take 0.5 mg by mouth 3 (Three) Times a Day As Needed.  2   • amLODIPine (NORVASC) 5 MG tablet Take 5 mg by mouth Daily.  3   • atorvastatin (LIPITOR) 40 MG tablet Take 40 mg by mouth Daily.  3   • chlorhexidine (HIBICLENS) 4 % external liquid  Shower with hibiclens solution as directed for 5 days prior to surgery 236 mL 0   • desloratadine (CLARINEX) 5 MG tablet TAKE ONE TABLET BY MOUTH EVERY DAY AT BEDTIME AS DIRECTED  6   • fenofibrate 160 MG tablet Take 160 mg by mouth Every Night.  3   • gabapentin (NEURONTIN) 800 MG tablet Take 400 mg by mouth 2 (Two) Times a Day.  4   • HYDROcodone-acetaminophen (NORCO) 7.5-325 MG per tablet Take 1 tablet by mouth 3 (Three) Times a Day As Needed for Moderate Pain .  0   • ipratropium (ATROVENT) 0.06 % nasal spray 1 spray into the nostril(s) as directed by provider 2 (Two) Times a Day As Needed for Rhinitis.     • lisinopril (PRINIVIL,ZESTRIL) 20 MG tablet Take 20 mg by mouth Daily.  3   • montelukast (SINGULAIR) 10 MG tablet Take 10 mg by mouth Every Night.  5   • mupirocin (BACTROBAN) 2 % ointment Apply into the nostril(s) as directed by provider 2 (Two) Times a Day. 22 g 0   • sildenafil (REVATIO) 20 MG tablet TAKE UP TO 3 TABLETS BY MOUTH EVERY DAY AS NEEDED  5   • Testosterone Cypionate (DEPOTESTOTERONE CYPIONATE) 200 MG/ML injection INJECT 1 ML IM EVERY 14 DAYS-  1       PMH:   Past Medical History:   Diagnosis Date   • Abdominal hernia    • Arthritis    • GERD (gastroesophageal reflux disease)    • Headache    • [place] (hard of hearing)    • Hyperlipidemia    • Hypertriglyceridemia    • Migraine    • Seasonal allergies    • Sleep apnea with use of continuous positive airway pressure (CPAP)      "compliant with machine    • SOBOE (shortness of breath on exertion)    • Wears glasses    • Wears partial dentures     \"dental implant\"  tops and most bottom - crowns and implants      PSH:    Past Surgical History:   Procedure Laterality Date   • COLONOSCOPY     • DENTAL PROCEDURE  08/2018    Dental Implants   • ENDOSCOPY     • EPIDURAL      injection in shoulder    • HERNIA REPAIR      umbilical hernia on left side    • KNEE SURGERY Right 2010    scope   • SHOULDER SURGERY Right 2010    labrium and rotator cuff- couple times    • TONSILLECTOMY     • WISDOM TOOTH EXTRACTION      all 4 removed    • WRIST SURGERY Right      Social History:   Tobacco:   Social History     Tobacco Use   Smoking Status Never Smoker   Smokeless Tobacco Never Used      Alcohol:     Social History     Substance and Sexual Activity   Alcohol Use Yes   • Alcohol/week: 8.4 oz   • Types: 7 Cans of beer, 7 Shots of liquor per week       Vitals:           /89 (BP Location: Right arm, Patient Position: Lying)   Pulse 75   Temp 98.4 °F (36.9 °C) (Temporal)   Resp 18   SpO2 96%     Physical Exam:  General Appearance:    Alert, cooperative, no distress, appears stated age   Head:    Normocephalic, without obvious abnormality, atraumatic   Lungs:     Clear to auscultation bilaterally, respirations unlabored    Heart:   Regular rate and rhythm, S1 and S2 normal, no murmur, rub    or gallop    Abdomen:    Soft, non-tender.  +bowel sounds   Breast Exam:    deferred   Genitalia:    deferred   Extremities:   Extremities normal, atraumatic, no cyanosis or edema   Skin:   Skin color, texture, turgor normal, no rashes or lesions   Neurologic:   Grossly intact   Results Review  I reviewed the patient's new clinical results.    Cancer Staging (if applicable)  Cancer Patient: __ yes _x_no __unknown; If yes, clinical stage T:__ N:__M:__, stage group or __N/A    Impression: Osteoarthritis of right hip    Plan: I reviewed my findings with patient today. " He would like to proceed with hip replacement surgery, knowing the risks, benefits, and alternatives.  Please see my counseling note for details.  He has exhausted conservative treatment options.       Surgical Counseling      I have informed the patient of the diagnosis and the prognosis.  Exhaustive conservative treatment modalities have not resulted in long term pain relief.  The symptoms have progressed to the point of daily pain and inability to perform activities of daily living without significant pain.  The patient has reached the point of desiring to proceed with total hip arthroplasty after discussing the risks, benefits and alternatives to the procedure.  The surgical procedure itself was discussed in detail.  Risks of the procedure were discussed, which included but are not limited to, bleeding, infection, damage to blood vessels and nerves, incomplete pain relief, loosening of the prosthesis, deep infection, need for further surgery, leg length discrepancy, hip dislocation, loss of limb, deep venous thrombosis, pulmonary embolus, death, heart attack, stroke, kidney failure, liver failure, and anesthetic complications.  In addition, the potential for deep infection developing in the future was discussed, which could require further surgery.  The hip would have to be re-opened, debrided, and potentially remove the prosthesis, which may or may not be replaced in the future.  Also, the possibility for loosening of the prosthesis has been mentioned.  If the prosthesis loosened, a revision arthroplasty could be performed, with results that are not as predictable compared to the original procedure.  The typical rehabilitative course has also been discussed, and full recovery may take up to a year to see the maximum benefit.  The importance of patient cooperation in the rehabilitative efforts has also been discussed.  No guarantees whatsoever were given.  The patient understands the potential risks versus the  benefits and desires to proceed with total hip arthroplasty at a mutually convenient time.    Larissa Orellana, APRN   3/19/2019   12:06 PM       Agree with above.  Plan for right total hip arthroplasty.    Electronically signed by Anil Graham MD at 3/19/2019  3:51 PM       Discharge Summary     No notes of this type exist for this encounter.

## 2019-03-20 NOTE — PROGRESS NOTES
"      [facility] Orthopaedic Surgery Progress Note    Subjective      LOS: 0 days   Patient Care Team:  Sarbjit Rebolledo MD as PCP - General (Internal Medicine)    Chief complaint: Right hip pain       Interval History:   Patient resting comfortably in bed.  Pain is controlled.  He would like to go home today if possible.    Objective      Vital Signs  Temp (24hrs), Av.9 °F (36.6 °C), Min:97.2 °F (36.2 °C), Max:98.4 °F (36.9 °C)      /66 (BP Location: Right arm, Patient Position: Lying)   Pulse 69   Temp 97.8 °F (36.6 °C) (Oral)   Resp 16   Ht 177.8 cm (70\")   Wt 114 kg (252 lb 3.3 oz)   SpO2 99%   BMI 36.19 kg/m²     Examination:   Examination of the right hip: The wound is clean, dry, and intact.  Ankle dorsiflexion, ankle plantar flexion, and EHL are intact.  Sensation intact in the foot to light touch.   Thigh is soft and nontender.      Labs:  Results from last 7 days   Lab Units 19  0555   WBC 10*3/mm3 18.27*   HEMOGLOBIN g/dL 14.6   HEMATOCRIT % 44.3   MCV fL 98.2   PLATELETS 10*3/mm3 238       Radiology:  Imaging Results (last 24 hours)     Procedure Component Value Units Date/Time    XR Hip With or Without Pelvis 2 - 3 View Right [[id number]] Collected:  19 1653     Updated:  19 0831    Narrative:       EXAMINATION: XR HIP, W OR WO PELVIS, 2-3 VIEW, RIGHT-2019:      INDICATION: Post-op right ELVIRA; M16.11-Unilateral primary osteoarthritis,  right hip.      COMPARISON: 2018.     FINDINGS: Status post right total hip arthroplasty without evidence of  complication in excellent anatomic alignment. Expected postsurgical  changes of the adjacent soft tissues including soft tissue emphysema. SI  joints and pubic symphysis without widening. Mild-to-moderate DJD of the  left hip.           Impression:       Status post right total hip arthroplasty in excellent  anatomic alignment without complication.     D:  2019  E:  2019     This report was finalized on " 3/20/2019 8:27 AM by Dr. Jeromy Parish.             PT:  Evaluation pending today.  He did walk 250 feet last night.     Results Review:     I reviewed the patient's new clinical results.    Assessment and Plan     Assessment:   Status post right total hip arthroplasty-doing well      Primary osteoarthritis of right hip    HTN (hypertension)    HLD (hyperlipidemia)    FAIZAN on CPAP    Chronic pain    Chronic narcotic use      Plan for disposition: Plan for discharge to home today.  Follow-up with me in 3 weeks as planned.    4/10/2019 11:10 AM Anil Graham MD [facility] ORTHOPEDIC SPORTS MEDICINE    Appt Notes: 3 WEEK  POSTOP             Anil Graham MD  03/20/19  8:35 AM

## 2019-03-20 NOTE — DISCHARGE PLACEMENT REQUEST
"Gabriela Woods  [phone]  Order for Patricio Nunez (53 y.o. Male)     Date of Birth Social Security Number Address Home Phone MRN    1965  [address] [id number] [phone] [id number]    Evangelical Marital Status          Non-Tenriism Single       Admission Date Admission Type Admitting Provider Attending Provider Department, Room/Bed    3/19/19 Elective Anil Graham MD Kirk, Michael E, MD [address], S368/1    Discharge Date Discharge Disposition Discharge Destination         Home or Self Care              Attending Provider:  Anil Graham MD    Allergies:  Keflex [Cephalexin]    Isolation:  None   Infection:  None   Code Status:  CPR    Ht:  177.8 cm (70\")   Wt:  114 kg (252 lb 3.3 oz)    Admission Cmt:  None   Principal Problem:  Status post total hip replacement, right [Z96.641]                 Active Insurance as of 3/19/2019     Primary Coverage     Payor Plan Insurance Group Employer/Plan Group    ANTHEM BLUE CROSS ANTHEM BLUE CROSS BLUE [facility] PPO [id number]     Payor Plan Address Payor Plan Phone Number Payor Plan Fax Number Effective Dates    [address] [phone]  2019 - None Entered    [place] [address]       Subscriber Name Subscriber Birth Date Member ID       PATRICIO GARZA 1965 [id number]                 Emergency Contacts      (Rel.) Home Phone Work Phone Mobile Phone    Salina Pascual (Significant Other) [phone] -- [phone]        [address] [address]  Dept. Phone:  [phone]  Dept. Fax:   Date Ordered: Mar 20, 2019         Patient:  Patricio Garza MRN:  [id number]   [address] [facility] [address] :  1965  SSN:    Phone: [phone] Sex:  M     Weight: 114 kg (252 lb 3.3 oz)         Ht Readings from Last 1 Encounters:   19 177.8 cm (70\")         Larry" (Order ID: [id number])    Diagnosis:  Primary osteoarthritis of right hip (M16.11 [ICD-10-CM] 715.15 [ICD-9-CM])  Impaired functional mobility, balance, gait, and endurance (Z74.09 [ICD-10-CM] V49.89 [ICD-9-CM])  Impaired mobility and ADLs (Z74.09 [ICD-10-CM] 799.89 [ICD-9-CM])  Status post total hip replacement, right (Z96.641 [ICD-10-CM] V43.64 [ICD-9-CM])   Quantity:  1     Equipment:  Walker Folding with Wheels  Length of Need (99 Months = Lifetime): 99 Months = Lifetime        Authorizing Provider's Phone: [phone]   Verbal Order Mode: Telephone with readback   Authorizing Provider: Geraldo Ashley MD  Authorizing Provider's NPI: [id number]     Order Entered By: Gabriela Woods RN 3/20/2019 11:27 AM                    History & Physical      Geraldo Ashley MD at 3/19/2019  1:44 PM          Patient Name: Patricio Iverson  MRN: [id number]  : 1965  DOS: 3/19/2019    Attending: Anil Graham MD    Primary Care Provider: Sarbjit Rebolledo MD      Chief complaint:  Right hip pain    Subjective   Patient is a 53 y.o. male presented for scheduled surgery by Dr. Graham.  He anticipates a right total hip replacement today.  He has hip has been painful for many years.  He is a cane for ambulation.    When seen preop he is doing well.  He denies pain or other complaints. He denies nausea, shortness of breath or chest pain. No hx of DVT or PE.    He has had chronic back pain for many years and chronic narcotic use managed by PCP.    (Above is noted, agree.  I saw Mr. Iverson in PACU after surgery.  He underwent right total hip arthroplasty under spinal anesthesia, he tolerated surgery well and is being admitted for further management.  His pain is under good control after surgery.  No nausea or vomiting, no shortness of breath or chest pain.)  [place]    Allergies:  Allergies   Allergen Reactions   • Keflex [Cephalexin] Hives       Meds:  Medications Prior to Admission   Medication Sig  Dispense Refill Last Dose   • ALLERGY SERUM INJECTION Inject  under the skin into the appropriate area as directed 1 (One) Time.      • ALPRAZolam (XANAX) 0.5 MG tablet Take 0.5 mg by mouth 3 (Three) Times a Day As Needed.  2 3/19/2019   • amLODIPine (NORVASC) 5 MG tablet Take 5 mg by mouth Daily.  3 3/19/2019 at       • atorvastatin (LIPITOR) 40 MG tablet Take 40 mg by mouth Daily.  3 3/18/2019   • cetirizine (zyrTEC) 10 MG tablet Take 10 mg by mouth Daily.   3/19/2019   • desloratadine (CLARINEX) 5 MG tablet TAKE ONE TABLET BY MOUTH EVERY DAY AT BEDTIME AS DIRECTED  6 3/18/2019   • fenofibrate 160 MG tablet Take 160 mg by mouth Every Night.  3 3/18/2019   • gabapentin (NEURONTIN) 800 MG tablet Take 400 mg by mouth 2 (Two) Times a Day.  4 3/18/2019   • Ginkgo Biloba (GINKOBA PO) Take 1 capsule by mouth Daily.   3/18/2019   • HYDROcodone-acetaminophen (NORCO) 7.5-325 MG per tablet Take 1 tablet by mouth 3 (Three) Times a Day As Needed for Moderate Pain .  0 3/19/2019 at 0830   • ipratropium (ATROVENT) 0.06 % nasal spray 1 spray into the nostril(s) as directed by provider 2 (Two) Times a Day As Needed for Rhinitis.   Past Month   • lisinopril (PRINIVIL,ZESTRIL) 20 MG tablet Take 20 mg by mouth Daily.  3 3/18/2019   • loratadine (CLARITIN) 10 MG tablet Take 10 mg by mouth Daily.   3/19/2019   • Melatonin 10 MG capsule Take 2 capsules by mouth Every Night.   3/18/2019   • montelukast (SINGULAIR) 10 MG tablet Take 10 mg by mouth Every Night.  5 3/18/2019   • Multiple Vitamins-Minerals (MULTIVITAMIN ADULT PO) Take 1 capsule by mouth Daily.   3/18/2019   • omeprazole (priLOSEC) 20 MG capsule Take 20 mg by mouth Daily.   3/19/2019   • sildenafil (REVATIO) 20 MG tablet TAKE UP TO 3 TABLETS BY MOUTH EVERY DAY AS NEEDED  5 Past Month   • diclofenac (VOLTAREN) 75 MG EC tablet Take 75 mg by mouth Daily.  11 3/12/2019   • glucosamine-chondroitin 500-400 MG capsule capsule Take 1 capsule by mouth 2 (Two) Times a Day With Meals.    "3/12/2019   • Omega-3 Fatty Acids (FISH OIL) 1200 MG capsule delayed-release Take 1 capsule by mouth 2 (Two) Times a Day. 1290   3/12/2019   • Testosterone Cypionate (DEPOTESTOTERONE CYPIONATE) 200 MG/ML injection INJECT 1 ML IM EVERY 14 DAYS-  1 3/13/2019       History:   Past Medical History:   Diagnosis Date   • Abdominal hernia    • Arthritis    • GERD (gastroesophageal reflux disease)    • Headache    • [place] (hard of hearing)    • Hyperlipidemia    • Hypertriglyceridemia    • Migraine    • Seasonal allergies    • Sleep apnea with use of continuous positive airway pressure (CPAP)     compliant with machine    • SOBOE (shortness of breath on exertion)    • Wears glasses    • Wears partial dentures     \"dental implant\"  tops and most bottom - crowns and implants      Past Surgical History:   Procedure Laterality Date   • COLONOSCOPY     • DENTAL PROCEDURE  08/2018    Dental Implants   • ENDOSCOPY     • EPIDURAL      injection in shoulder    • HERNIA REPAIR      umbilical hernia on left side    • KNEE SURGERY Right 2010    scope   • SHOULDER SURGERY Right 2010    labrium and rotator cuff- couple times    • TONSILLECTOMY     • WISDOM TOOTH EXTRACTION      all 4 removed    • WRIST SURGERY Right      Family History   Problem Relation Age of Onset   • No Known Problems Mother    • Cancer Father      Social History     Tobacco Use   • Smoking status: Never Smoker   • Smokeless tobacco: Never Used   Substance Use Topics   • Alcohol use: Yes     Alcohol/week: 8.4 oz     Types: 7 Cans of beer, 7 Shots of liquor per week   • Drug use: No   Lives with his significant other.  He has 2 children.  Works in maintenance.    Review of Systems  All systems were reviewed and negative except for:  Respiratory: positive for  BATEMAN    Vital Signs  Visit Vitals  /89 (BP Location: Right arm, Patient Position: Lying)   Pulse 75   Temp 98.4 °F (36.9 °C) (Temporal)   Resp 18   SpO2 96%       Physical Exam:    General Appearance:    " Alert, cooperative, in no acute distress   Head:    Normocephalic, without obvious abnormality, atraumatic   Eyes:            Lids and lashes normal, conjunctivae and sclerae normal, no   icterus, no pallor, corneas clear   Ears:    Ears appear intact with no abnormalities noted   Neck:   No adenopathy, supple, trachea midline, no thyromegaly   Lungs:     Clear to auscultation,respirations regular, even and                   unlabored    Heart:    Regular rhythm and normal rate, normal S1 and S2, no            murmur, no gallop   Abdomen:     Normal bowel sounds, no masses, no organomegaly, soft        non-tender, non-distended, no guarding, no rebound                 tenderness   Genitalia:    Deferred   Extremities:   Moves all extremities well, no edema, no cyanosis, no              Redness  (Postoperative exam: Right hip with clean dry and intact dressing on.  Lower extremities with intact motor and sensory function distally.  Normal cap refill and pulses.)  [place]   Pulses:   Pulses palpable and equal bilaterally   Skin:   No bleeding, bruising or rash   Neurologic:   Cranial nerves 2 - 12 grossly intact, sensation intact      I reviewed the patient's new clinical results.     Results for GREG RAMIRO DAN (MRN [id number]) as of 3/19/2019 13:49   Ref. Range 3/7/2019 11:25   C-Reactive Protein Latest Ref Range: 0.00 - 1.00 mg/dL 0.07   Protime Latest Ref Range: 11.2 - 14.3 Seconds 12.6   INR Latest Ref Range: 0.85 - 1.16  0.99   PTT Latest Ref Range: 24.0 - 37.0 seconds 29.7   Sed Rate Latest Ref Range: 0 - 20 mm/hr 8     Lab Results   Component Value Date    HGBA1C 5.50 03/07/2019       Assessment and Plan:     Status post right total hip arthroplasty.    Primary osteoarthritis of right hip    HTN (hypertension)    HLD (hyperlipidemia)    FAIZAN on CPAP    Chronic pain    Chronic narcotic use      Plan  1. PT/OT- early ambulation post op  2. Pain control-prns   3. IS-encourage  4. DVT proph- mechs/ASA  5. Bowel  regimen  6. Resume home medications as appropriate  7. Monitor post-op labs  8. DC planning for home    HTN, HLD  - Continue home norvasc, statin and lisinopril  - Monitor BP   - Holding parameters for BP meds  - Labetalol PRN for SBP>170    FAIZAN  - CPAP at night      Hue ALLYSON Peace  03/19/19  1:49 PM     I have personally performed the evaluation on this patient. My history is consistent  with HPI obtained. My exam findings are listed above. I have personally reviewed and discussed the above formulated treatment plan with pt and [email or web address].      Electronically signed by Geraldo Ashley MD at 3/19/2019  5:39 PM     Anil Graham MD at 3/19/2019 12:06 PM          Pre-Op H&P  Patricio Iverson  [id number]  1965    Chief complaint: Right hip pain    HPI:    Patient is a 53 y.o.male who presents with right hip pain and found to have end stage right hip arthritis.  Here today for right total hip arthroplasty.  Recent right shoulder steroid injection 3/7/19    Review of Systems:  General ROS: negative for chills, fever or skin lesions;  No changes since last office visit  Cardiovascular ROS: no chest pain or dyspnea on exertion  Respiratory ROS: no cough, shortness of breath, or wheezing    Allergies:   Allergies   Allergen Reactions   • Keflex [Cephalexin] Hives       Home Meds:    No current facility-administered medications on file prior to encounter.      Current Outpatient Medications on File Prior to Encounter   Medication Sig Dispense Refill   • ALLERGY SERUM INJECTION Inject  under the skin into the appropriate area as directed 1 (One) Time.     • ALPRAZolam (XANAX) 0.5 MG tablet Take 0.5 mg by mouth 3 (Three) Times a Day As Needed.  2   • amLODIPine (NORVASC) 5 MG tablet Take 5 mg by mouth Daily.  3   • atorvastatin (LIPITOR) 40 MG tablet Take 40 mg by mouth Daily.  3   • chlorhexidine (HIBICLENS) 4 % external liquid  Shower with hibiclens solution as directed for 5 days prior to surgery  "236 mL 0   • desloratadine (CLARINEX) 5 MG tablet TAKE ONE TABLET BY MOUTH EVERY DAY AT BEDTIME AS DIRECTED  6   • fenofibrate 160 MG tablet Take 160 mg by mouth Every Night.  3   • gabapentin (NEURONTIN) 800 MG tablet Take 400 mg by mouth 2 (Two) Times a Day.  4   • HYDROcodone-acetaminophen (NORCO) 7.5-325 MG per tablet Take 1 tablet by mouth 3 (Three) Times a Day As Needed for Moderate Pain .  0   • ipratropium (ATROVENT) 0.06 % nasal spray 1 spray into the nostril(s) as directed by provider 2 (Two) Times a Day As Needed for Rhinitis.     • lisinopril (PRINIVIL,ZESTRIL) 20 MG tablet Take 20 mg by mouth Daily.  3   • montelukast (SINGULAIR) 10 MG tablet Take 10 mg by mouth Every Night.  5   • mupirocin (BACTROBAN) 2 % ointment Apply into the nostril(s) as directed by provider 2 (Two) Times a Day. 22 g 0   • sildenafil (REVATIO) 20 MG tablet TAKE UP TO 3 TABLETS BY MOUTH EVERY DAY AS NEEDED  5   • Testosterone Cypionate (DEPOTESTOTERONE CYPIONATE) 200 MG/ML injection INJECT 1 ML IM EVERY 14 DAYS-  1       PMH:   Past Medical History:   Diagnosis Date   • Abdominal hernia    • Arthritis    • GERD (gastroesophageal reflux disease)    • Headache    • [place] (hard of hearing)    • Hyperlipidemia    • Hypertriglyceridemia    • Migraine    • Seasonal allergies    • Sleep apnea with use of continuous positive airway pressure (CPAP)     compliant with machine    • SOBOE (shortness of breath on exertion)    • Wears glasses    • Wears partial dentures     \"dental implant\"  tops and most bottom - crowns and implants      PSH:    Past Surgical History:   Procedure Laterality Date   • COLONOSCOPY     • DENTAL PROCEDURE  08/2018    Dental Implants   • ENDOSCOPY     • EPIDURAL      injection in shoulder    • HERNIA REPAIR      umbilical hernia on left side    • KNEE SURGERY Right 2010    scope   • SHOULDER SURGERY Right 2010    labrium and rotator cuff- couple times    • TONSILLECTOMY     • WISDOM TOOTH EXTRACTION      all 4 removed "    • WRIST SURGERY Right      Social History:   Tobacco:   Social History     Tobacco Use   Smoking Status Never Smoker   Smokeless Tobacco Never Used      Alcohol:     Social History     Substance and Sexual Activity   Alcohol Use Yes   • Alcohol/week: 8.4 oz   • Types: 7 Cans of beer, 7 Shots of liquor per week       Vitals:           /89 (BP Location: Right arm, Patient Position: Lying)   Pulse 75   Temp 98.4 °F (36.9 °C) (Temporal)   Resp 18   SpO2 96%     Physical Exam:  General Appearance:    Alert, cooperative, no distress, appears stated age   Head:    Normocephalic, without obvious abnormality, atraumatic   Lungs:     Clear to auscultation bilaterally, respirations unlabored    Heart:   Regular rate and rhythm, S1 and S2 normal, no murmur, rub    or gallop    Abdomen:    Soft, non-tender.  +bowel sounds   Breast Exam:    deferred   Genitalia:    deferred   Extremities:   Extremities normal, atraumatic, no cyanosis or edema   Skin:   Skin color, texture, turgor normal, no rashes or lesions   Neurologic:   Grossly intact   Results Review  I reviewed the patient's new clinical results.    Cancer Staging (if applicable)  Cancer Patient: __ yes _x_no __unknown; If yes, clinical stage T:__ N:__M:__, stage group or __N/A    Impression: Osteoarthritis of right hip    Plan: I reviewed my findings with patient today. He would like to proceed with hip replacement surgery, knowing the risks, benefits, and alternatives.  Please see my counseling note for details.  He has exhausted conservative treatment options.       Surgical Counseling      I have informed the patient of the diagnosis and the prognosis.  Exhaustive conservative treatment modalities have not resulted in long term pain relief.  The symptoms have progressed to the point of daily pain and inability to perform activities of daily living without significant pain.  The patient has reached the point of desiring to proceed with total hip arthroplasty  after discussing the risks, benefits and alternatives to the procedure.  The surgical procedure itself was discussed in detail.  Risks of the procedure were discussed, which included but are not limited to, bleeding, infection, damage to blood vessels and nerves, incomplete pain relief, loosening of the prosthesis, deep infection, need for further surgery, leg length discrepancy, hip dislocation, loss of limb, deep venous thrombosis, pulmonary embolus, death, heart attack, stroke, kidney failure, liver failure, and anesthetic complications.  In addition, the potential for deep infection developing in the future was discussed, which could require further surgery.  The hip would have to be re-opened, debrided, and potentially remove the prosthesis, which may or may not be replaced in the future.  Also, the possibility for loosening of the prosthesis has been mentioned.  If the prosthesis loosened, a revision arthroplasty could be performed, with results that are not as predictable compared to the original procedure.  The typical rehabilitative course has also been discussed, and full recovery may take up to a year to see the maximum benefit.  The importance of patient cooperation in the rehabilitative efforts has also been discussed.  No guarantees whatsoever were given.  The patient understands the potential risks versus the benefits and desires to proceed with total hip arthroplasty at a mutually convenient time.    Larissa Orellana, ALLYSON   3/19/2019   12:06 PM       Agree with above.  Plan for right total hip arthroplasty.    Electronically signed by Anil Graham MD at 3/19/2019  3:51 PM       Discharge Summary     No notes of this type exist for this encounter.

## 2019-04-08 ENCOUNTER — OFFICE VISIT (OUTPATIENT)
Dept: ORTHOPEDIC SURGERY | Facility: CLINIC | Age: 54
End: 2019-04-08

## 2019-04-08 VITALS — HEIGHT: 70 IN | BODY MASS INDEX: 35.98 KG/M2 | OXYGEN SATURATION: 98 % | HEART RATE: 65 BPM | WEIGHT: 251.32 LBS

## 2019-04-08 DIAGNOSIS — Z96.641 STATUS POST TOTAL REPLACEMENT OF RIGHT HIP: Primary | ICD-10-CM

## 2019-04-08 DIAGNOSIS — Z47.89 ORTHOPEDIC AFTERCARE: ICD-10-CM

## 2019-04-08 DIAGNOSIS — M25.511 RIGHT SHOULDER PAIN, UNSPECIFIED CHRONICITY: Primary | ICD-10-CM

## 2019-04-08 DIAGNOSIS — M19.011 ARTHRITIS OF RIGHT ACROMIOCLAVICULAR JOINT: ICD-10-CM

## 2019-04-08 DIAGNOSIS — IMO0002 DISORDER OF ROTATOR CUFF SYNDROME OF RIGHT SHOULDER AND ALLIED DISORDER: ICD-10-CM

## 2019-04-08 PROCEDURE — 99024 POSTOP FOLLOW-UP VISIT: CPT | Performed by: ORTHOPAEDIC SURGERY

## 2019-04-08 PROCEDURE — 20605 DRAIN/INJ JOINT/BURSA W/O US: CPT | Performed by: PHYSICIAN ASSISTANT

## 2019-04-08 PROCEDURE — 99213 OFFICE O/P EST LOW 20 MIN: CPT | Performed by: PHYSICIAN ASSISTANT

## 2019-04-08 RX ORDER — DESLORATADINE 5 MG/1
5 TABLET ORAL DAILY
Refills: 5 | COMMUNITY
Start: 2019-03-25

## 2019-04-08 RX ORDER — TRIAMCINOLONE ACETONIDE 40 MG/ML
20 INJECTION, SUSPENSION INTRA-ARTICULAR; INTRAMUSCULAR
Status: COMPLETED | OUTPATIENT
Start: 2019-04-08 | End: 2019-04-08

## 2019-04-08 RX ORDER — LIDOCAINE HYDROCHLORIDE 10 MG/ML
0.5 INJECTION, SOLUTION INFILTRATION; PERINEURAL
Status: COMPLETED | OUTPATIENT
Start: 2019-04-08 | End: 2019-04-08

## 2019-04-08 RX ADMIN — TRIAMCINOLONE ACETONIDE 20 MG: 40 INJECTION, SUSPENSION INTRA-ARTICULAR; INTRAMUSCULAR at 13:51

## 2019-04-08 RX ADMIN — LIDOCAINE HYDROCHLORIDE 0.5 ML: 10 INJECTION, SOLUTION INFILTRATION; PERINEURAL at 13:51

## 2019-04-08 NOTE — PROGRESS NOTES
"    [facility] [place] [facility] [place]    Subjective     Chief Complaint   Patient presents with   • Right Hip - Follow-up      3 week f/u  Post (R) ELVIRA 3/19/19        HPI    Patricio Iverson is a 53 y.o. male.  He follows up today for his right total hip arthroplasty.  He is doing well today, with no complaints.  100% improvement compared to his preoperative symptoms.  Fully ambulatory without external aids.      Patient Active Problem List   Diagnosis   • Primary osteoarthritis of right hip   • HTN (hypertension)   • HLD (hyperlipidemia)   • FAIZAN on CPAP   • Chronic pain   • Chronic narcotic use   • Status post total hip replacement, right     Past Medical History:   Diagnosis Date   • Abdominal hernia    • Arthritis    • GERD (gastroesophageal reflux disease)    • Headache    • [place] (hard of hearing)    • Hyperlipidemia    • Hypertriglyceridemia    • Migraine    • Seasonal allergies    • Sleep apnea with use of continuous positive airway pressure (CPAP)     compliant with machine    • SOBOE (shortness of breath on exertion)    • Wears glasses    • Wears partial dentures     \"dental implant\"  tops and most bottom - crowns and implants       Past Surgical History:   Procedure Laterality Date   • COLONOSCOPY     • DENTAL PROCEDURE  08/2018    Dental Implants   • ENDOSCOPY     • EPIDURAL      injection in shoulder    • HERNIA REPAIR      umbilical hernia on left side    • KNEE SURGERY Right 2010    scope   • SHOULDER SURGERY Right 2010    labrium and rotator cuff- couple times    • TONSILLECTOMY     • TOTAL HIP ARTHROPLASTY Right 3/19/2019    Procedure: TOTAL RIGHT HIP ARTHROPLASTY;  Surgeon: Anil Graham MD;  Location: [facility];  Service: Orthopedics   • WISDOM TOOTH EXTRACTION      all 4 removed    • WRIST SURGERY Right       Family History   Problem Relation Age of Onset   • No Known Problems Mother    • Cancer Father      Social History     Socioeconomic History   • Marital status: Single     Spouse " name: Not on file   • Number of children: Not on file   • Years of education: Not on file   • Highest education level: Not on file   Tobacco Use   • Smoking status: Never Smoker   • Smokeless tobacco: Never Used   Substance and Sexual Activity   • Alcohol use: Yes     Alcohol/week: 8.4 oz     Types: 7 Cans of beer, 7 Shots of liquor per week   • Drug use: No   • Sexual activity: Defer      Current Outpatient Medications on File Prior to Visit   Medication Sig Dispense Refill   • ALLERGY SERUM INJECTION Inject  under the skin into the appropriate area as directed 1 (One) Time.     • ALPRAZolam (XANAX) 0.5 MG tablet Take 0.5 mg by mouth 3 (Three) Times a Day As Needed.  2   • amLODIPine (NORVASC) 5 MG tablet Take 5 mg by mouth Daily.  3   • aspirin  MG EC tablet Take 1 tablet by mouth Daily. 30 tablet 0   • atorvastatin (LIPITOR) 40 MG tablet Take 40 mg by mouth Daily.  3   • desloratadine (CLARINEX) 5 MG tablet Take 5 mg by mouth Daily.  5   • diclofenac (VOLTAREN) 75 MG EC tablet Take 75 mg by mouth Daily.  11   • docusate sodium 100 MG capsule Take 100 mg by mouth 2 (Two) Times a Day As Needed for Constipation. 40 each 0   • fenofibrate 160 MG tablet Take 160 mg by mouth Every Night.  3   • gabapentin (NEURONTIN) 800 MG tablet Take 400 mg by mouth 2 (Two) Times a Day.  4   • ipratropium (ATROVENT) 0.06 % nasal spray 1 spray into the nostril(s) as directed by provider 2 (Two) Times a Day As Needed for Rhinitis.     • lisinopril (PRINIVIL,ZESTRIL) 20 MG tablet Take 20 mg by mouth Daily.  3   • Melatonin 10 MG capsule Take 2 capsules by mouth Every Night.     • montelukast (SINGULAIR) 10 MG tablet Take 10 mg by mouth Every Night.  5   • Multiple Vitamins-Minerals (MULTIVITAMIN ADULT PO) Take 1 capsule by mouth Daily.     • Omega-3 Fatty Acids (FISH OIL) 1200 MG capsule delayed-release Take 1 capsule by mouth 2 (Two) Times a Day. 1290     • omeprazole (priLOSEC) 20 MG capsule Take 20 mg by mouth Daily.     •  oxyCODONE-acetaminophen (PERCOCET) 7.5-325 MG per tablet Take 1 tablet by mouth Every 6 (Six) Hours As Needed for Moderate Pain . 20 tablet 0   • sildenafil (REVATIO) 20 MG tablet TAKE UP TO 3 TABLETS BY MOUTH EVERY DAY AS NEEDED  5   • Testosterone Cypionate (DEPOTESTOTERONE CYPIONATE) 200 MG/ML injection INJECT 1 ML IM EVERY 14 DAYS-  1   • [DISCONTINUED] cetirizine (zyrTEC) 10 MG tablet Take 10 mg by mouth Daily.       No current facility-administered medications on file prior to visit.       Allergies   Allergen Reactions   • Keflex [Cephalexin] Hives        Review of Systems   Constitutional: Negative.    HENT: Negative.    Eyes: Negative.    Respiratory: Negative.    Cardiovascular: Negative.    Gastrointestinal: Negative.    Endocrine: Negative.    Genitourinary: Negative.    Musculoskeletal: Positive for arthralgias.   Skin: Negative.    Allergic/Immunologic: Negative.    Neurological: Negative.    Hematological: Negative.    Psychiatric/Behavioral: Negative.         Objective      Physical Exam  There were no vitals taken for this visit.    There is no height or weight on file to calculate BMI.    General:   Mental Status:  Alert   Appearance: Cooperative, in no acute distress   Build and Nutrition: Overweight male   Orientation: Alert and oriented to person, place and time   Posture: Normal   Gait: Normal    Integument:   Right hip: Wound is well-healed with no signs of infection    Lower Extremity:   Right Hip:    Tenderness:  None    Swelling:  None    Crepitus:  None    Range of motion:  External Rotation: 30°       Internal Rotation: 30°       Flexion:  100°       Extension:  0°    Deformities:  None  Functional testing: Negative Stinchfield    No leg length discrepancy      Imaging/Studies      Imaging Results (last 24 hours)     Procedure Component Value Units Date/Time    XR Hip With or Without Pelvis 1 View Right [[id number]] Resulted:  04/08/19 1330     Updated:  04/08/19 1331    Narrative:        Right Hip Radiographs  Indication: status-post right total hip arthroplasty  Views: low AP pelvis and lateral of the right hip    Comparison: no change compared to prior study, 3/19/2019    Findings:   The components are well aligned, with no signs of loosening or failure.          Assessment and Plan     Patricio was seen today for follow-up.    Diagnoses and all orders for this visit:    Status post total replacement of right hip  -     XR Hip With or Without Pelvis 1 View Right    Orthopedic aftercare        1. Status post total replacement of right hip    2. Orthopedic aftercare        I reviewed my findings with the patient today.  His right total hip arthroplasty is functioning well, he is pleased with results.  I will see him back in 6 weeks for recheck, but sooner for any problems.    Return in about 6 weeks (around 5/20/2019).      Medical Decision Making  Management Options : physical/occupational therapy  Data/Risk: radiology tests and independent visualization of imaging, lab tests, or EMG/NCV      Anil Graham MD  04/08/19  1:42 PM

## 2019-04-08 NOTE — PROGRESS NOTES
Procedure   Medium Joint Arthrocentesis: R acromioclavicular  Date/Time: 4/8/2019 1:51 PM  Consent given by: patient  Site marked: site marked  Timeout: Immediately prior to procedure a time out was called to verify the correct patient, procedure, equipment, support staff and site/side marked as required   Supporting Documentation  Indications: pain   Procedure Details  Location: shoulder - R acromioclavicular  Preparation: Patient was prepped and draped in the usual sterile fashion  Needle size: 25 G (short)  Approach: anterolateral  Medications administered: 20 mg triamcinolone acetonide 40 MG/ML; 0.5 mL lidocaine 1 %  Patient tolerance: patient tolerated the procedure well with no immediate complications

## 2019-04-08 NOTE — PROGRESS NOTES
"    [facility] [place] [facility] [place]    Subjective     CC: Follow-up of the Right Shoulder (1 MONTH F/U/Right shoulder pain)      HPI    Patricio Iverson is a 53 y.o. male.  Today for follow-up evaluation of his right shoulder.  I previously seen in 3/7/2019 and provided him a subacromial corticosteroid injection to the right shoulder.  He reports minimal benefit from this injection.  He still localizes pain mostly over the AC joint.  Pain scale 3-4/10.  Severity the pain mild to moderate.  Quality pain is constant dull and aching.  Associated symptoms include stiffness and popping.  No reported radiculopathy or paresthesias.    Patient is just undergone right ELVIRA on 3/19/2019 by Dr. Graham.      ROS:    Constiutional:Pt denies fever, chills, nausea, or vomiting.  MSK:as above    Objective      Past Medical History  Past Medical History:   Diagnosis Date   • Abdominal hernia    • Arthritis    • GERD (gastroesophageal reflux disease)    • Headache    • [place] (hard of hearing)    • Hyperlipidemia    • Hypertriglyceridemia    • Migraine    • Seasonal allergies    • Sleep apnea with use of continuous positive airway pressure (CPAP)     compliant with machine    • SOBOE (shortness of breath on exertion)    • Wears glasses    • Wears partial dentures     \"dental implant\"  tops and most bottom - crowns and implants          Physical Exam  Pulse 65   Ht 177.8 cm (70\")   Wt 114 kg (251 lb 5.2 oz)   SpO2 98%   BMI 36.06 kg/m²     Body mass index is 36.06 kg/m².    Patient is well nourished and well developed.        Ortho Exam  Musculoskeletal              Upper Extremity              Right Shoulder                           Inspection and Palpation:                           Tenderness -majority tenderness directly over ACJ with some mild discomfort still noted to the lateral aspect of the shoulder.                            Crepitus -positive to the ACJ.                          Sensation is normal                "           Examination reveals no ecchymosis.                              Strength and Tone:                          Supraspinatus - 4/5                          External Rotators- 4/5                          Infraspinatus - 4/5                          Subscapularis - 5/5                          Deltoid - 5/5                 Range of Motion              Right Shoulder:                          Internal Rotation: ROM - 50                          External Rotation: AROM - 75 degrees                          Elevation through flexion: AROM - 170 degrees                  Instability              Right shoulder                          Sulcus sign negative                          Apprehension test negative                          Roxanne relocation test negative                          Jerk test negative                 Impingement              Right shoulder                          Dave-Milton impingement test positive                          Neer impingement test positive                 Functional Testing              Right shoulder                          AC crossover adduction test positive                          Abdominal compression test negative                          Lift-off sign negative                          Speed's test negative                          Quezada's test negative      Imaging/Labs/EMG Reviewed:    Imaging Results (last 24 hours)     ** No results found for the last 24 hours. **      None today.    Assessment:  1. Right shoulder pain, unspecified chronicity    2. Disorder of rotator cuff syndrome of right shoulder and allied disorder    3. Arthritis of right acromioclavicular joint        Plan:  1. Right shoulder pain with rotator cuff syndrome and AC joint arthritis.  Minimal improvement with subacromial corticosteroid injection.  2. He was offered and accepted ACJ corticosteroid injection.  It was given today.  3. Continue with gentle range of motion of the  shoulder.  4. Continue with over-the-counter pain medication as directed.  5. Follow-up in 4 weeks for repeat evaluation.  6. Questions and concerns answered.    After discussing the risks of injection, the patient gave consent to proceed.  His right shoulder ACJ was confirmed as the correct joint to be injected with a timeout.  It was then prepped using Hibiclens and injected with a mixture of 1/2 cc of 1% plain lidocaine and 1/2 cc of Kenalog (40 mg per mL) without any resistance through the suiperior approach, patient in seated position.  Area was cleaned, hemostasis was achieved and a Band-Aid was applied over the injection site.  The patient tolerated procedure well.  I instructed the patient on signs and symptoms of infection.  They should report to the emergency department or return to clinic if any of these develop, for further evaluation and treatment.  Recommended modifying activity for the next 48 hours to include rest, ice, elevation and oral pain medication as needed.      Medical Decision Making  Management Options : over-the-counter medicine and prescription/IM medicine      Joyce Lentz PA-C  04/08/19  2:19 PM

## 2019-05-07 ENCOUNTER — OFFICE VISIT (OUTPATIENT)
Dept: ORTHOPEDIC SURGERY | Facility: CLINIC | Age: 54
End: 2019-05-07

## 2019-05-07 VITALS — BODY MASS INDEX: 35.98 KG/M2 | HEIGHT: 70 IN | WEIGHT: 251.32 LBS | OXYGEN SATURATION: 98 % | HEART RATE: 77 BPM

## 2019-05-07 DIAGNOSIS — IMO0002 DISORDER OF ROTATOR CUFF SYNDROME OF RIGHT SHOULDER AND ALLIED DISORDER: ICD-10-CM

## 2019-05-07 DIAGNOSIS — G56.02 CARPAL TUNNEL SYNDROME OF LEFT WRIST: ICD-10-CM

## 2019-05-07 DIAGNOSIS — M19.011 ARTHRITIS OF RIGHT ACROMIOCLAVICULAR JOINT: ICD-10-CM

## 2019-05-07 DIAGNOSIS — M19.022 ARTHRITIS OF LEFT ELBOW: Primary | ICD-10-CM

## 2019-05-07 PROCEDURE — 20605 DRAIN/INJ JOINT/BURSA W/O US: CPT | Performed by: ORTHOPAEDIC SURGERY

## 2019-05-07 PROCEDURE — 99213 OFFICE O/P EST LOW 20 MIN: CPT | Performed by: ORTHOPAEDIC SURGERY

## 2019-05-07 RX ORDER — TRIAMCINOLONE ACETONIDE 40 MG/ML
20 INJECTION, SUSPENSION INTRA-ARTICULAR; INTRAMUSCULAR
Status: COMPLETED | OUTPATIENT
Start: 2019-05-07 | End: 2019-05-07

## 2019-05-07 RX ORDER — LIDOCAINE HYDROCHLORIDE 10 MG/ML
1 INJECTION, SOLUTION EPIDURAL; INFILTRATION; INTRACAUDAL; PERINEURAL
Status: COMPLETED | OUTPATIENT
Start: 2019-05-07 | End: 2019-05-07

## 2019-05-07 RX ADMIN — LIDOCAINE HYDROCHLORIDE 1 ML: 10 INJECTION, SOLUTION EPIDURAL; INFILTRATION; INTRACAUDAL; PERINEURAL at 11:47

## 2019-05-07 RX ADMIN — TRIAMCINOLONE ACETONIDE 20 MG: 40 INJECTION, SUSPENSION INTRA-ARTICULAR; INTRAMUSCULAR at 11:47

## 2019-05-07 NOTE — PROGRESS NOTES
"    [facility] [place] [facility] Note    Subjective     CC: Follow-up (4 week follow up. Last cortisone injection given in ACJ 4/8/19)      HPI    Patricio Iverson is a 53 y.o. male.  Patient returns for follow-up of his right shoulder and also his left elbow.  The left elbow is bothering him more than anything else.  He is done really well after right carpal tunnel release.  The left side still bother him from time to time.  When we initially saw him in a while back, we injected his ulnohumeral joint he got great relief.  Right shoulder helped him a little bit and he has had prior surgery on that shoulder.  He has not had an MRI there.      ROS:    Constiutional:Pt denies fever, chills, nausea, or vomiting.  MSK:as above    Objective      Past Medical History  Past Medical History:   Diagnosis Date   • Abdominal hernia    • Arthritis    • GERD (gastroesophageal reflux disease)    • Headache    • [place] (hard of hearing)    • Hyperlipidemia    • Hypertriglyceridemia    • Migraine    • Seasonal allergies    • Sleep apnea with use of continuous positive airway pressure (CPAP)     compliant with machine    • SOBOE (shortness of breath on exertion)    • Wears glasses    • Wears partial dentures     \"dental implant\"  tops and most bottom - crowns and implants          Physical Exam  Pulse 77   Ht 177.8 cm (70\")   Wt 114 kg (251 lb 5.2 oz)   SpO2 98%   BMI 36.06 kg/m²     Body mass index is 36.06 kg/m².    Patient is well nourished and well developed.        Ortho Exam  Positive Dave and Neer on the right shoulder  5 out of 5 supraspinatus with pain  Left elbow: Range of motion   Full supination pronation  Tender at the radiocapitellar joint    Assessment:  1. Arthritis of left elbow    2. Disorder of rotator cuff syndrome of right shoulder and allied disorder    3. Arthritis of right acromioclavicular joint    4. Carpal tunnel syndrome of left wrist        Plan:  1. Recommend over the counter " anti-inflammatories for pain and/or swelling  2. Right shoulder pain with AC joint arthritis and rotator cuff syndrome--patient will need an MRI at some point when he wants to evaluate things further.  We will observe this for now  3. Left elbow arthritis--repeat ulnohumeral injection will be given today through a lateral approach.  This was tolerated well.  Follow-up when necessary for the left elbow.  4. Left carpal tunnel syndrome--moderate on nerve conduction studies done in April 2018 and read by Dr. Floyd.  Median nerve motor latency 4.6 ms.  Patient will likely need surgical release at some point going forward.  Observe for now.      Medical Decision Making  Management Options : over-the-counter medicine and prescription/IM medicine      Rolando Mosqueda MD  05/07/19  12:26 PM

## 2019-05-07 NOTE — PROGRESS NOTES
Procedure   Medium Joint Arthrocentesis: L elbow  Date/Time: 5/7/2019 11:47 AM  Consent given by: patient  Site marked: site marked  Timeout: Immediately prior to procedure a time out was called to verify the correct patient, procedure, equipment, support staff and site/side marked as required   Supporting Documentation  Indications: pain   Procedure Details  Location: elbow - L elbow  Preparation: Patient was prepped and draped in the usual sterile fashion  Needle size: 25 G  Medications administered: 1 mL lidocaine PF 1% 1 %; 20 mg triamcinolone acetonide 40 MG/ML  Patient tolerance: patient tolerated the procedure well with no immediate complications

## 2019-05-15 ENCOUNTER — OFFICE VISIT (OUTPATIENT)
Dept: ORTHOPEDIC SURGERY | Facility: CLINIC | Age: 54
End: 2019-05-15

## 2019-05-15 DIAGNOSIS — Z47.89 ORTHOPEDIC AFTERCARE: ICD-10-CM

## 2019-05-15 DIAGNOSIS — Z96.641 STATUS POST TOTAL REPLACEMENT OF RIGHT HIP: Primary | ICD-10-CM

## 2019-05-15 PROCEDURE — 99024 POSTOP FOLLOW-UP VISIT: CPT | Performed by: ORTHOPAEDIC SURGERY

## 2019-05-15 NOTE — PROGRESS NOTES
"    [facility] [place] [facility] [place]    Subjective     Chief Complaint   Patient presents with   • Right Hip - Follow-up     6 week follow up. 2 month status post total hip arthroplasty 3/19/19        HPI    Patricio Iverson is a 53 y.o. male.  He follows up today for his right total hip arthroplasty.  He is doing well today, with 100% relief compared to his preoperative symptoms.  He is pleased with the results.  No pain today.  Fully ambulatory without external aids.      Patient Active Problem List   Diagnosis   • Primary osteoarthritis of right hip   • HTN (hypertension)   • HLD (hyperlipidemia)   • FAIZAN on CPAP   • Chronic pain   • Chronic narcotic use   • Status post total hip replacement, right     Past Medical History:   Diagnosis Date   • Abdominal hernia    • Arthritis    • GERD (gastroesophageal reflux disease)    • Headache    • [place] (hard of hearing)    • Hyperlipidemia    • Hypertriglyceridemia    • Migraine    • Seasonal allergies    • Sleep apnea with use of continuous positive airway pressure (CPAP)     compliant with machine    • SOBOE (shortness of breath on exertion)    • Wears glasses    • Wears partial dentures     \"dental implant\"  tops and most bottom - crowns and implants       Past Surgical History:   Procedure Laterality Date   • COLONOSCOPY     • DENTAL PROCEDURE  08/2018    Dental Implants   • ENDOSCOPY     • EPIDURAL      injection in shoulder    • HERNIA REPAIR      umbilical hernia on left side    • KNEE SURGERY Right 2010    scope   • SHOULDER SURGERY Right 2010    labrium and rotator cuff- couple times    • TONSILLECTOMY     • TOTAL HIP ARTHROPLASTY Right 3/19/2019    Procedure: TOTAL RIGHT HIP ARTHROPLASTY;  Surgeon: Anil Graham MD;  Location: [facility];  Service: Orthopedics   • WISDOM TOOTH EXTRACTION      all 4 removed    • WRIST SURGERY Right       Family History   Problem Relation Age of Onset   • No Known Problems Mother    • Cancer Father      Social History "     Socioeconomic History   • Marital status: Single     Spouse name: Not on file   • Number of children: Not on file   • Years of education: Not on file   • Highest education level: Not on file   Tobacco Use   • Smoking status: Never Smoker   • Smokeless tobacco: Never Used   Substance and Sexual Activity   • Alcohol use: Yes     Alcohol/week: 8.4 oz     Types: 7 Cans of beer, 7 Shots of liquor per week   • Drug use: No   • Sexual activity: Defer      Current Outpatient Medications on File Prior to Visit   Medication Sig Dispense Refill   • ALLERGY SERUM INJECTION Inject  under the skin into the appropriate area as directed 1 (One) Time.     • ALPRAZolam (XANAX) 0.5 MG tablet Take 0.5 mg by mouth 3 (Three) Times a Day As Needed.  2   • amLODIPine (NORVASC) 5 MG tablet Take 5 mg by mouth Daily.  3   • aspirin  MG EC tablet Take 1 tablet by mouth Daily. 30 tablet 0   • atorvastatin (LIPITOR) 40 MG tablet Take 40 mg by mouth Daily.  3   • desloratadine (CLARINEX) 5 MG tablet Take 5 mg by mouth Daily.  5   • diclofenac (VOLTAREN) 75 MG EC tablet Take 75 mg by mouth Daily.  11   • docusate sodium 100 MG capsule Take 100 mg by mouth 2 (Two) Times a Day As Needed for Constipation. 40 each 0   • fenofibrate 160 MG tablet Take 160 mg by mouth Every Night.  3   • gabapentin (NEURONTIN) 800 MG tablet Take 400 mg by mouth 2 (Two) Times a Day.  4   • ipratropium (ATROVENT) 0.06 % nasal spray 1 spray into the nostril(s) as directed by provider 2 (Two) Times a Day As Needed for Rhinitis.     • lisinopril (PRINIVIL,ZESTRIL) 20 MG tablet Take 20 mg by mouth Daily.  3   • Melatonin 10 MG capsule Take 2 capsules by mouth Every Night.     • montelukast (SINGULAIR) 10 MG tablet Take 10 mg by mouth Every Night.  5   • Multiple Vitamins-Minerals (MULTIVITAMIN ADULT PO) Take 1 capsule by mouth Daily.     • Omega-3 Fatty Acids (FISH OIL) 1200 MG capsule delayed-release Take 1 capsule by mouth 2 (Two) Times a Day. 1290     •  omeprazole (priLOSEC) 20 MG capsule Take 20 mg by mouth Daily.     • sildenafil (REVATIO) 20 MG tablet TAKE UP TO 3 TABLETS BY MOUTH EVERY DAY AS NEEDED  5   • Testosterone Cypionate (DEPOTESTOTERONE CYPIONATE) 200 MG/ML injection INJECT 1 ML IM EVERY 14 DAYS-  1   • [DISCONTINUED] oxyCODONE-acetaminophen (PERCOCET) 7.5-325 MG per tablet Take 1 tablet by mouth Every 6 (Six) Hours As Needed for Moderate Pain . 20 tablet 0     No current facility-administered medications on file prior to visit.       Allergies   Allergen Reactions   • Keflex [Cephalexin] Hives        Review of Systems   Constitutional: Positive for activity change.   HENT: Negative.    Eyes: Negative.    Respiratory: Negative.    Cardiovascular: Negative.    Gastrointestinal: Negative.    Endocrine: Negative.    Genitourinary: Negative.    Musculoskeletal: Positive for arthralgias.   Skin: Negative.    Allergic/Immunologic: Negative.    Neurological: Negative.    Hematological: Negative.    Psychiatric/Behavioral: Negative.         Objective      Physical Exam  There were no vitals taken for this visit.    There is no height or weight on file to calculate BMI.    General:   Mental Status:  Alert   Appearance: Cooperative, in no acute distress   Build and Nutrition: Overweight male   Orientation: Alert and oriented to person, place and time   Posture: Normal   Gait: Normal    Integument:   Right hip: Wound is well-healed with no signs of infection    Lower Extremity:   Right Hip:    Tenderness:  None    Swelling:  None    Crepitus:  None    Range of motion:  External Rotation: 30°       Internal Rotation: 30°       Flexion:  100°       Extension:  0°    Deformities:  None  Functional testing: [facility]    No leg length discrepancy      Assessment and Plan     Patricio was seen today for follow-up.    Diagnoses and all orders for this visit:    Status post total replacement of right hip    Orthopedic aftercare        1. Status post total  replacement of right hip    2. Orthopedic aftercare        I reviewed my findings with the patient today.  His right total hip arthroplasty is functioning well, and I will see him back in 4 months with a repeat x-ray.  I will see him back sooner for any problems.  He is pleased with the results.    Return in about 4 months (around 9/15/2019) for Recheck with X-Rays.      Anil Graham MD  05/15/19  1:36 PM

## 2019-07-18 ENCOUNTER — OFFICE VISIT (OUTPATIENT)
Dept: ORTHOPEDIC SURGERY | Facility: CLINIC | Age: 54
End: 2019-07-18

## 2019-07-18 VITALS — WEIGHT: 224.65 LBS | HEART RATE: 95 BPM | HEIGHT: 70 IN | OXYGEN SATURATION: 98 % | BODY MASS INDEX: 32.16 KG/M2

## 2019-07-18 DIAGNOSIS — M25.562 LEFT KNEE PAIN, UNSPECIFIED CHRONICITY: Primary | ICD-10-CM

## 2019-07-18 DIAGNOSIS — M17.12 PRIMARY OSTEOARTHRITIS OF LEFT KNEE: ICD-10-CM

## 2019-07-18 PROCEDURE — 20610 DRAIN/INJ JOINT/BURSA W/O US: CPT | Performed by: PHYSICIAN ASSISTANT

## 2019-07-18 PROCEDURE — 99214 OFFICE O/P EST MOD 30 MIN: CPT | Performed by: PHYSICIAN ASSISTANT

## 2019-07-18 RX ORDER — HYDROCODONE BITARTRATE AND ACETAMINOPHEN 10; 325 MG/1; MG/1
1 TABLET ORAL 4 TIMES DAILY PRN
Refills: 0 | COMMUNITY
Start: 2019-07-05 | End: 2021-09-01

## 2019-07-18 RX ADMIN — LIDOCAINE HYDROCHLORIDE 4 ML: 10 INJECTION, SOLUTION EPIDURAL; INFILTRATION; INTRACAUDAL; PERINEURAL at 09:27

## 2019-07-18 RX ADMIN — TRIAMCINOLONE ACETONIDE 40 MG: 40 INJECTION, SUSPENSION INTRA-ARTICULAR; INTRAMUSCULAR at 09:27

## 2019-07-18 NOTE — PROGRESS NOTES
"    [facility] [place] [facility] Clinic Note    Subjective     CC: Pain of the Left Knee      HPI    Patricio Iverson is a 53 y.o. male.  Patient presents orthopedic clinic for evaluation of his left knee.  States he began noting pain about a week ago.  He was cutting down trees and squatting and he noted increasing pain especially along the medial aspect but also travels on the anterior knee into the lateral side.  No mechanical symptoms such as locking or catching.  He has noted intermittent swelling.  Currently endorses a pain scale of 5/10.  Severity the pain moderate.  Quality the pain aching, throbbing.  Associated symptoms swelling, popping, stiffness.  Symptoms worse with walking and stair climbing.  Denies any numbness or tingling into the extremity.    ROS:    Constiutional:Pt denies fever, chills, nausea, or vomiting.  MSK:as above    Objective      Past Medical History  Past Medical History:   Diagnosis Date   • Abdominal hernia    • Arthritis    • GERD (gastroesophageal reflux disease)    • Headache    • [place] (hard of hearing)    • Hyperlipidemia    • Hypertriglyceridemia    • Migraine    • Seasonal allergies    • Sleep apnea with use of continuous positive airway pressure (CPAP)     compliant with machine    • SOBOE (shortness of breath on exertion)    • Wears glasses    • Wears partial dentures     \"dental implant\"  tops and most bottom - crowns and implants          Physical Exam  Pulse 95   Ht 177.8 cm (70\")   Wt 102 kg (224 lb 10.4 oz)   SpO2 98%   BMI 32.23 kg/m²     Body mass index is 32.23 kg/m².    Patient is well nourished and well developed.        Ortho Exam  Peripheral Vascular:    Upper Extremity:   Inspection:  Left--no cyanotic nail beds Right--no cyanotic nail beds   Bilateral:  Pink nail beds with brisk capillary refill   Palpation:  Bilateral radial pulse normal    Musculoskeletal:  Global Assessment:  Overall assessment of Lower Extremity Muscle Strength and Tone:    Right " quadriceps--5/5  Right hamstrings--5/5  Right tibialis anterior--5/5  Right gastroc soleus--5/5  Right EHL--5/5    Lower Extremity:  Knee/Patella:  No digital clubbing or cyanosis.    Examination of right knee reveals:  Normal deep tendon reflexes, coordination, strength, tone, sensation.  No known fractures or deformities.    Inspection and Palpation:    Right knee:  Tenderness: Positive medial joint line, anterior knee and minimal discomfort noted to the lateral joint line.  Effusion: Trace  Crepitus: Positive  Pulses:  2+  Ecchymosis:  None  Warmth:  None     ROM:  Right:  Extension:0    Flexion:125  Left:  Extension:0     Flexion:125    Instability:    Right:  Lachman Test:  Negative, Varus stress test negative, Valgus stress test negative   Anterior Drawer Test:  Negative, Posterior Drawer Test:  Negative    Deformities/Malalignments/Discrepancies:    Left: Genu varum  Right:  Genu varum    Functional Testing:  Right:  Rtiika's test:  Negative  Patella grind test:  Positive  Q-angle:  Normal  Apprehension Sign:  Negative      Imaging/Labs/EMG Reviewed:  Ordered plain film imaging left knee.  Images reviewed by Dr. Mosqueda.    Knee X-Ray     Indication: Pain     Study:  Upright AP, Skiers, Lateral, and Sunrise views of Left knee(s)     Comparison: None     Findings:     Patient appears to have mild to early moderate degenerative changes in the medial compartment.  There are minimal degenerative changes in the lateral compartment.  There are mild early moderate changes in the patellofemoral compartment.  Patient has overall varus alignment.       Impression: Mild to early moderate medial and patellofemoral compartment osteoarthritis of the left knee.      Assessment:  1. Left knee pain, unspecified chronicity    2. Primary osteoarthritis of left knee        Plan:  1. Left knee pain with known osteoarthritis, acute flare.  2. Offered and accepted a corticosteroid injection to the left knee.  Injection was  given today.  3. Recommend over-the-counter pain medication as needed.  4. Discussed icing to help assist with inflammation/pain control.  5. Follow up in 6 weeks for repeat evaluation, sooner if issues arise or symptoms worsen/change.  6. Questions and concerns answered.    After discussing the risks, benefits, indications of injection, the patient gave consent to proceed.  His left knee was confirmed as the correct joint to be injected with a timeout.  It was then prepped using Hibiclens and injected with a mixture of 4 cc of 1% plain lidocaine and 1 cc of Kenalog (40 mg per mL) without any resistance through the anterior lateral approach, patient in seated position.  Area was cleaned, hemostasis was achieved and a Band-Aid was applied over the injection site.  The patient tolerated procedure well.  I instructed the patient on signs and symptoms of infection.  They should report to the emergency department or return to clinic if any of these develop, for further evaluation and treatment.  Recommended modifying activity for the next 48 hours to include rest, ice, elevation and oral pain medication as needed.  Patient was observed ambulating normally after the injection.    Case was discussed with Dr. Mosqueda who agreed with the above assessment and plan.    Joyce Lentz PA-C  07/19/19  7:57 AM

## 2019-07-18 NOTE — PROGRESS NOTES
Procedure   Large Joint Arthrocentesis: L knee  Date/Time: 7/18/2019 9:27 AM  Consent given by: patient  Site marked: site marked  Timeout: Immediately prior to procedure a time out was called to verify the correct patient, procedure, equipment, support staff and site/side marked as required   Supporting Documentation  Indications: pain   Procedure Details  Location: knee - L knee  Preparation: Patient was prepped and draped in the usual sterile fashion  Needle size: 22 G  Approach: anterolateral  Medications administered: 4 mL lidocaine PF 1% 1 %; 40 mg triamcinolone acetonide 40 MG/ML  Patient tolerance: patient tolerated the procedure well with no immediate complications

## 2019-07-19 RX ORDER — TRIAMCINOLONE ACETONIDE 40 MG/ML
40 INJECTION, SUSPENSION INTRA-ARTICULAR; INTRAMUSCULAR
Status: COMPLETED | OUTPATIENT
Start: 2019-07-18 | End: 2019-07-18

## 2019-07-19 RX ORDER — LIDOCAINE HYDROCHLORIDE 10 MG/ML
4 INJECTION, SOLUTION EPIDURAL; INFILTRATION; INTRACAUDAL; PERINEURAL
Status: COMPLETED | OUTPATIENT
Start: 2019-07-18 | End: 2019-07-18

## 2019-09-18 ENCOUNTER — OFFICE VISIT (OUTPATIENT)
Dept: ORTHOPEDIC SURGERY | Facility: CLINIC | Age: 54
End: 2019-09-18

## 2019-09-18 VITALS — BODY MASS INDEX: 32.35 KG/M2 | HEART RATE: 72 BPM | WEIGHT: 226 LBS | HEIGHT: 70 IN | OXYGEN SATURATION: 98 %

## 2019-09-18 DIAGNOSIS — Z96.641 STATUS POST TOTAL REPLACEMENT OF RIGHT HIP: Primary | ICD-10-CM

## 2019-09-18 DIAGNOSIS — Z09 POSTOPERATIVE EXAMINATION: ICD-10-CM

## 2019-09-18 PROCEDURE — 99213 OFFICE O/P EST LOW 20 MIN: CPT | Performed by: ORTHOPAEDIC SURGERY

## 2019-09-18 NOTE — PROGRESS NOTES
"    [facility] [place] [facility] Clinic Note    Subjective     Chief Complaint   Patient presents with   • Follow-up     4 month follow up. 6 month status post total hip arthroplasty 3/19/19        HPI    Patricio Iverson is a 53 y.o. male.  He follows up today for his right total hip arthroplasty.  He is doing well today, with 100% relief compared to his preoperative symptoms.  Fully ambulatory without external aids.  No complaints.      Patient Active Problem List   Diagnosis   • Primary osteoarthritis of right hip   • HTN (hypertension)   • HLD (hyperlipidemia)   • FAIZAN on CPAP   • Chronic pain   • Chronic narcotic use   • Status post total hip replacement, right     Past Medical History:   Diagnosis Date   • Abdominal hernia    • Arthritis    • GERD (gastroesophageal reflux disease)    • Headache    • [place] (hard of hearing)    • Hyperlipidemia    • Hypertriglyceridemia    • Migraine    • Seasonal allergies    • Sleep apnea with use of continuous positive airway pressure (CPAP)     compliant with machine    • SOBOE (shortness of breath on exertion)    • Wears glasses    • Wears partial dentures     \"dental implant\"  tops and most bottom - crowns and implants       Past Surgical History:   Procedure Laterality Date   • COLONOSCOPY     • DENTAL PROCEDURE  08/2018    Dental Implants   • ENDOSCOPY     • EPIDURAL      injection in shoulder    • HERNIA REPAIR      umbilical hernia on left side    • KNEE SURGERY Right 2010    scope   • SHOULDER SURGERY Right 2010    labrium and rotator cuff- couple times    • TONSILLECTOMY     • TOTAL HIP ARTHROPLASTY Right 3/19/2019    Procedure: TOTAL RIGHT HIP ARTHROPLASTY;  Surgeon: Anil Graham MD;  Location: [facility];  Service: Orthopedics   • WISDOM TOOTH EXTRACTION      all 4 removed    • WRIST SURGERY Right       Family History   Problem Relation Age of Onset   • No Known Problems Mother    • Cancer Father      Social History     Socioeconomic History   • Marital status: " Single     Spouse name: Not on file   • Number of children: Not on file   • Years of education: Not on file   • Highest education level: Not on file   Tobacco Use   • Smoking status: Never Smoker   • Smokeless tobacco: Never Used   Substance and Sexual Activity   • Alcohol use: Yes     Alcohol/week: 8.4 oz     Types: 7 Cans of beer, 7 Shots of liquor per week   • Drug use: No   • Sexual activity: Defer      Current Outpatient Medications on File Prior to Visit   Medication Sig Dispense Refill   • ALLERGY SERUM INJECTION Inject  under the skin into the appropriate area as directed 1 (One) Time.     • ALPRAZolam (XANAX) 0.5 MG tablet Take 0.5 mg by mouth 3 (Three) Times a Day As Needed.  2   • amLODIPine (NORVASC) 5 MG tablet Take 5 mg by mouth Daily.  3   • aspirin  MG EC tablet Take 1 tablet by mouth Daily. 30 tablet 0   • atorvastatin (LIPITOR) 40 MG tablet Take 40 mg by mouth Daily.  3   • desloratadine (CLARINEX) 5 MG tablet Take 5 mg by mouth Daily.  5   • diclofenac (VOLTAREN) 75 MG EC tablet Take 75 mg by mouth Daily.  11   • docusate sodium 100 MG capsule Take 100 mg by mouth 2 (Two) Times a Day As Needed for Constipation. 40 each 0   • fenofibrate 160 MG tablet Take 160 mg by mouth Every Night.  3   • gabapentin (NEURONTIN) 800 MG tablet Take 400 mg by mouth 2 (Two) Times a Day.  4   • HYDROcodone-acetaminophen (NORCO)  MG per tablet Take 1 tablet by mouth 4 (Four) Times a Day As Needed.  0   • ipratropium (ATROVENT) 0.06 % nasal spray 1 spray into the nostril(s) as directed by provider 2 (Two) Times a Day As Needed for Rhinitis.     • lisinopril (PRINIVIL,ZESTRIL) 20 MG tablet Take 20 mg by mouth Daily.  3   • Melatonin 10 MG capsule Take 2 capsules by mouth Every Night.     • montelukast (SINGULAIR) 10 MG tablet Take 10 mg by mouth Every Night.  5   • Multiple Vitamins-Minerals (MULTIVITAMIN ADULT PO) Take 1 capsule by mouth Daily.     • Omega-3 Fatty Acids (FISH OIL) 1200 MG capsule  "delayed-release Take 1 capsule by mouth 2 (Two) Times a Day. 1290     • omeprazole (priLOSEC) 20 MG capsule Take 20 mg by mouth Daily.     • sildenafil (REVATIO) 20 MG tablet TAKE UP TO 3 TABLETS BY MOUTH EVERY DAY AS NEEDED  5   • Testosterone Cypionate (DEPOTESTOTERONE CYPIONATE) 200 MG/ML injection INJECT 1 ML IM EVERY 14 DAYS-  1     No current facility-administered medications on file prior to visit.       Allergies   Allergen Reactions   • Keflex [Cephalexin] Hives        Review of Systems   Constitutional: Negative.    HENT: Negative.    Eyes: Negative.    Respiratory: Negative.    Cardiovascular: Negative.    Gastrointestinal: Negative.    Endocrine: Negative.    Genitourinary: Negative.    Musculoskeletal: Positive for arthralgias.   Skin: Negative.    Allergic/Immunologic: Negative.    Neurological: Negative.    Hematological: Negative.    Psychiatric/Behavioral: Negative.         Objective      Physical Exam  Pulse 72   Ht 177.8 cm (70\")   Wt 103 kg (226 lb)   SpO2 98%   BMI 32.43 kg/m²     Body mass index is 32.43 kg/m².    General:   Mental Status:  Alert   Appearance: Cooperative, in no acute distress   Build and Nutrition: Overweight male   Orientation: Alert and oriented to person, place and time   Posture: Normal   Gait: Normal    Integument:   Right hip: Wound is well-healed with no signs of infection    Lower Extremity:   Right Hip:    Tenderness:  None    Swelling:  None    Crepitus:  None    Range of motion:  External Rotation: 30°       Internal Rotation: 30°       Flexion:  100°       Extension:  0°    Deformities:  None  Functional testing: Negative Stinchfield    No leg length discrepancy      Imaging/Studies      Imaging Results (last 24 hours)     Procedure Component Value Units Date/Time    XR Hip With or Without Pelvis 2 - 3 View Right [[id number]] Resulted:  09/18/19 1000     Updated:  09/18/19 1000    Narrative:       Right Hip Radiographs  Indication: status-post right total hip " arthroplasty  Views: low AP pelvis and lateral of the right hip    Comparison: no change compared to prior study, 4/8/2019    Findings:   The components are well aligned, with no signs of loosening or failure.          Assessment and Plan     Patricio was seen today for follow-up.    Diagnoses and all orders for this visit:    Status post total replacement of right hip  -     XR Hip With or Without Pelvis 2 - 3 View Right    Postoperative examination        1. Status post total replacement of right hip    2. Postoperative examination        I reviewed my findings with patient today.  His right total hip arthroplasty is functioning well, and he is pleased with results.  I will see him back in 6 months, which would be a one-year checkup, but sooner for any problems.  X-rays of his hip on the next visit.    As he was leaving today, he was telling me about his left knee, which is bothering him.  He is going to make a separate appointment for an evaluation on that knee.  He did have a steroid injection, with no significant relief.    Return in about 6 months (around 3/18/2020) for Recheck with X-Rays.      Medical Decision Making  Data/Risk: radiology tests and independent visualization of imaging, lab tests, or EMG/NCV      Anil Graham MD  09/18/19  10:26 AM

## 2019-10-22 ENCOUNTER — OFFICE VISIT (OUTPATIENT)
Dept: ORTHOPEDIC SURGERY | Facility: CLINIC | Age: 54
End: 2019-10-22

## 2019-10-22 VITALS — OXYGEN SATURATION: 99 % | HEIGHT: 70 IN | WEIGHT: 227.07 LBS | BODY MASS INDEX: 32.51 KG/M2 | HEART RATE: 85 BPM

## 2019-10-22 DIAGNOSIS — M25.511 RIGHT SHOULDER PAIN, UNSPECIFIED CHRONICITY: ICD-10-CM

## 2019-10-22 DIAGNOSIS — M19.011 ARTHRITIS OF RIGHT ACROMIOCLAVICULAR JOINT: ICD-10-CM

## 2019-10-22 DIAGNOSIS — IMO0002 DISORDER OF ROTATOR CUFF SYNDROME OF RIGHT SHOULDER AND ALLIED DISORDER: ICD-10-CM

## 2019-10-22 DIAGNOSIS — M25.562 LEFT KNEE PAIN, UNSPECIFIED CHRONICITY: Primary | ICD-10-CM

## 2019-10-22 DIAGNOSIS — M17.12 PRIMARY OSTEOARTHRITIS OF LEFT KNEE: ICD-10-CM

## 2019-10-22 PROCEDURE — 99213 OFFICE O/P EST LOW 20 MIN: CPT | Performed by: PHYSICIAN ASSISTANT

## 2019-10-22 NOTE — PROGRESS NOTES
"    [facility] [place] [facility] [place]    Subjective     CC: Pain of the Left Knee and Pain of the Right Shoulder      HPI    Patricio Iverson is a 54 y.o. male.  Patient returns today for follow-up evaluation of his right shoulder and left knee.  With regard to his right shoulder, he reports constant pain.  Significant stiffness and grinding with any type of movements or activities.  Difficulty with any reaching, lifting or overhead movements.  He is undergone various injections (subacromial, AC), home PT, anti-inflammatories--all with very minimal benefit initially and pain is gradually worsening.    He reports his left knee did all right with the corticosteroid injection given in July 2019 but his pain is returning.  Pain is still predominantly localized medial aspect of the knee.  No mechanical symptoms such as locking or catching.  Symptoms are worse with walking and stair climbing.    Current pain scale 3/10.  Severity the pain moderate.  Quality pain dull.      ROS:    Constiutional:Pt denies fever, chills, nausea, or vomiting.  MSK:as above    Objective      Past Medical History  Past Medical History:   Diagnosis Date   • Abdominal hernia    • Arthritis    • GERD (gastroesophageal reflux disease)    • Headache    • [place] (hard of hearing)    • Hyperlipidemia    • Hypertriglyceridemia    • Migraine    • Seasonal allergies    • Sleep apnea with use of continuous positive airway pressure (CPAP)     compliant with machine    • SOBOE (shortness of breath on exertion)    • Wears glasses    • Wears partial dentures     \"dental implant\"  tops and most bottom - crowns and implants          Physical Exam  Pulse 85   Ht 177.8 cm (70\")   Wt 103 kg (227 lb 1.2 oz)   SpO2 99%   BMI 32.58 kg/m²     Body mass index is 32.58 kg/m².    Patient is well nourished and well developed.        Ortho Exam  Right shoulder  Tenderness: Lateral aspect of the shoulder as well as over ACJ.  He also notes pain deep inside.  "   Cuff strength: 4/5  Motion: Forward flexion 170 degrees with increasing pain, external rotation 65 degrees, internal rotation L4.  Testing: Dave, Neer, AC crossover adduction test, abdominal compression test all positive.  Motor/sensory: Remains grossly intact C5-T1.    Left knee  Tenderness: Medial greater than lateral joint line.  Motion: 0-125 degrees with crepitus noted  Instability: Lachman negative, varus valgus stress negative  Motor/sensory: Grossly intact L2-S1.      Imaging/Labs/EMG Reviewed:  Imaging Results (last 24 hours)     ** No results found for the last 24 hours. **      No new imaging today.      Assessment:  1. Left knee pain, unspecified chronicity    2. Primary osteoarthritis of left knee    3. Right shoulder pain, unspecified chronicity    4. Disorder of rotator cuff syndrome of right shoulder and allied disorder    5. Arthritis of right acromioclavicular joint        Plan:  1. Left knee pain due to osteoarthritis--patient would like to try Visco supplementation series.  Order placed for preauthorization through insurance.  Once injections available patient will return to clinic.  2. Right shoulder pain due to rotator cuff syndrome and ACJ osteoarthritis--order MRI for better evaluation as patient is not responding to injections, anti-inflammatories and PT.  Follow-up after MRI completed.  3. Continue with over-the-counter pain medication as needed.  4. Questions and concerns answered.      Joyce Lentz PA-C  10/24/19  8:50 AM

## 2019-10-27 ENCOUNTER — HOSPITAL ENCOUNTER (OUTPATIENT)
Dept: MRI IMAGING | Facility: HOSPITAL | Age: 54
Discharge: HOME | End: 2019-10-27
Admitting: PHYSICIAN ASSISTANT

## 2019-10-27 DIAGNOSIS — IMO0002 DISORDER OF ROTATOR CUFF SYNDROME OF RIGHT SHOULDER AND ALLIED DISORDER: ICD-10-CM

## 2019-10-27 DIAGNOSIS — M19.011 ARTHRITIS OF RIGHT ACROMIOCLAVICULAR JOINT: ICD-10-CM

## 2019-10-27 DIAGNOSIS — M25.511 RIGHT SHOULDER PAIN, UNSPECIFIED CHRONICITY: ICD-10-CM

## 2019-10-27 PROCEDURE — 73221 MRI JOINT UPR EXTREM W/O DYE: CPT

## 2019-11-05 ENCOUNTER — OFFICE VISIT (OUTPATIENT)
Dept: ORTHOPEDIC SURGERY | Facility: CLINIC | Age: 54
End: 2019-11-05

## 2019-11-05 VITALS — OXYGEN SATURATION: 98 % | HEART RATE: 84 BPM | WEIGHT: 227.07 LBS | BODY MASS INDEX: 32.51 KG/M2 | HEIGHT: 70 IN

## 2019-11-05 DIAGNOSIS — M25.511 RIGHT SHOULDER PAIN, UNSPECIFIED CHRONICITY: Primary | ICD-10-CM

## 2019-11-05 DIAGNOSIS — M19.011 ARTHRITIS OF RIGHT ACROMIOCLAVICULAR JOINT: ICD-10-CM

## 2019-11-05 DIAGNOSIS — M75.111 INCOMPLETE TEAR OF RIGHT ROTATOR CUFF, UNSPECIFIED WHETHER TRAUMATIC: ICD-10-CM

## 2019-11-05 PROCEDURE — 99213 OFFICE O/P EST LOW 20 MIN: CPT | Performed by: ORTHOPAEDIC SURGERY

## 2019-11-05 PROCEDURE — 20605 DRAIN/INJ JOINT/BURSA W/O US: CPT | Performed by: ORTHOPAEDIC SURGERY

## 2019-11-05 RX ORDER — TRIAMCINOLONE ACETONIDE 40 MG/ML
20 INJECTION, SUSPENSION INTRA-ARTICULAR; INTRAMUSCULAR
Status: COMPLETED | OUTPATIENT
Start: 2019-11-05 | End: 2019-11-05

## 2019-11-05 RX ORDER — LIDOCAINE HYDROCHLORIDE 10 MG/ML
1 INJECTION, SOLUTION INFILTRATION; PERINEURAL
Status: COMPLETED | OUTPATIENT
Start: 2019-11-05 | End: 2019-11-05

## 2019-11-05 RX ADMIN — LIDOCAINE HYDROCHLORIDE 1 ML: 10 INJECTION, SOLUTION INFILTRATION; PERINEURAL at 08:45

## 2019-11-05 RX ADMIN — TRIAMCINOLONE ACETONIDE 20 MG: 40 INJECTION, SUSPENSION INTRA-ARTICULAR; INTRAMUSCULAR at 08:45

## 2019-11-05 NOTE — PROGRESS NOTES
Procedure   Medium Joint Arthrocentesis: R acromioclavicular  Date/Time: 11/5/2019 8:45 AM  Consent given by: patient  Site marked: site marked  Timeout: Immediately prior to procedure a time out was called to verify the correct patient, procedure, equipment, support staff and site/side marked as required   Supporting Documentation  Indications: pain   Procedure Details  Location: shoulder - R acromioclavicular  Preparation: Patient was prepped and draped in the usual sterile fashion  Needle size: 25 G  Approach: superior  Medications administered: 1 mL lidocaine 1 %; 20 mg triamcinolone acetonide 40 MG/ML  Patient tolerance: patient tolerated the procedure well with no immediate complications

## 2019-11-05 NOTE — PROGRESS NOTES
"    [facility] [place] [facility] Clinic Note    Subjective     CC: Follow-up of the Right Shoulder (After MRI 10/27/2019)      HPI    Patricio Iverson is a 54 y.o. male.  Patient is here for MRI follow-up of the right shoulder.  Patient has been injected in April at the AC joint by Joyce and also subacromially.  He has had prior rotator cuff repair by Dr. Contreras 8 to 10 years ago.  He has had another surgery by Dr. Whitt where his \"labrum was scraped\".      ROS:    Constiutional:Pt denies fever, chills, nausea, or vomiting.  MSK:as above    Objective      Past Medical History  Past Medical History:   Diagnosis Date   • Abdominal hernia    • Arthritis    • GERD (gastroesophageal reflux disease)    • Headache    • [place] (hard of hearing)    • Hyperlipidemia    • Hypertriglyceridemia    • Migraine    • Seasonal allergies    • Sleep apnea with use of continuous positive airway pressure (CPAP)     compliant with machine    • SOBOE (shortness of breath on exertion)    • Wears glasses    • Wears partial dentures     \"dental implant\"  tops and most bottom - crowns and implants          Physical Exam  Pulse 84   Ht 177.8 cm (70\")   Wt 103 kg (227 lb 1.2 oz)   SpO2 98%   BMI 32.58 kg/m²     Body mass index is 32.58 kg/m².    Patient is well nourished and well developed.        Ortho Exam  Musculoskeletal   Upper Extremity   Right Shoulder     Inspection and Palpation:     Medial border scapular tenderness-none    AC Joint Tenderness -moderate    Sensation is normal    Examination reveals no ecchymosis.        Strength and Tone:    Supraspinatus - 5/5 with pain    External Rotators-5/5    Infraspinatus - 5/5    Subscapularis - 5/5    Deltoid - 5/5     Range of Motion      RightShoulder:    Internal Rotation: ROM - L4    External Rotation: AROM - 60 degrees    Elevation through flexion: AROM - 140 degrees        Impingement   Right shoulder    Dave-Milton impingement test positive    Neer impingement test " negative     Functional Testing   Right shoulder    AC crossover adduction test positive    Speeds test negative    Uppercut test negative    O'Briens test negative    Drop arm sign negative    Apprehension relocation negative      Imaging/Labs/EMG Reviewed:  Imaging Results (Last 24 Hours)     ** No results found for the last 24 hours. **      IMPRESSION:  1. Previous rotator cuff repair.  2. Small insertional tear of the supraspinatus, and focal  partial-thickness bursal surface tear. Mild undersurface irregularity  more questionable for mid supraspinatus tear.  3. No evidence of internal derangement elsewhere.  4. Extensive AC joint arthropathy noted.         D:  10/27/2019  E:  10/28/2019     This report was finalized on 10/31/2019 9:31 PM by DR. Red Clark MD.      We have reviewed the images and report listed above.  Patient does appear to have a small area where there is a pinpoint significant thinning of the tendon at the musculotendinous junction.  For the most part however the supraspinatus appears intact.  There is significant edema at the AC joint.    Assessment:  1. Right shoulder pain, unspecified chronicity    2. Incomplete tear of right rotator cuff, unspecified whether traumatic    3. Arthritis of right acromioclavicular joint        Plan:  1. Recommend over the counter anti-inflammatories for pain and/or swelling  2. Right AC joint arthritis and chronic right shoulder pain with partial-thickness re-tear of the supraspinatus--overall, the tendon looks healthy.  I would not suggest any further surgery on the supraspinatus.  With regards to his AC joint arthritis, diagnostic and therapeutic injection will be attempted there.  See him back in about 6 weeks to assess efficacy.      Rolando Mosqueda MD  11/05/19  8:29 AM

## 2019-12-17 ENCOUNTER — OFFICE VISIT (OUTPATIENT)
Dept: ORTHOPEDIC SURGERY | Facility: CLINIC | Age: 54
End: 2019-12-17

## 2019-12-17 VITALS — HEART RATE: 81 BPM | OXYGEN SATURATION: 97 % | WEIGHT: 227.07 LBS | HEIGHT: 70 IN | BODY MASS INDEX: 32.51 KG/M2

## 2019-12-17 DIAGNOSIS — M19.011 ARTHRITIS OF RIGHT ACROMIOCLAVICULAR JOINT: ICD-10-CM

## 2019-12-17 DIAGNOSIS — M25.511 RIGHT SHOULDER PAIN, UNSPECIFIED CHRONICITY: Primary | ICD-10-CM

## 2019-12-17 DIAGNOSIS — M75.111 INCOMPLETE TEAR OF RIGHT ROTATOR CUFF, UNSPECIFIED WHETHER TRAUMATIC: ICD-10-CM

## 2019-12-17 PROCEDURE — 99213 OFFICE O/P EST LOW 20 MIN: CPT | Performed by: ORTHOPAEDIC SURGERY

## 2019-12-17 NOTE — PROGRESS NOTES
"    [facility] [place] Orthopa[facility] Surgery Clinic Note    Subjective     CC: Follow-up of the Right Shoulder ( 6 weeks)      HPI    Patricio Iverson is a 54 y.o. male.  Patient is here today for follow-up after his right AC joint injection.  He has had prior rotator cuff repair.  An MRI has been done recently on 10/31/2019 roughly.  He was injected at his last visit on 11/5/2019 and got a little bit of relief.  He has been to [place] recently remodeling his beach house.  He had to hang cabinets and was doing a lot of overhead work.      ROS:    Constiutional:Pt denies fever, chills, nausea, or vomiting.  MSK:as above    Objective      Past Medical History  Past Medical History:   Diagnosis Date   • Abdominal hernia    • Arthritis    • GERD (gastroesophageal reflux disease)    • Headache    • [place] (hard of hearing)    • Hyperlipidemia    • Hypertriglyceridemia    • Migraine    • Seasonal allergies    • Sleep apnea with use of continuous positive airway pressure (CPAP)     compliant with machine    • SOBOE (shortness of breath on exertion)    • Wears glasses    • Wears partial dentures     \"dental implant\"  tops and most bottom - crowns and implants          Physical Exam  Pulse 81   Ht 177.8 cm (70\")   Wt 103 kg (227 lb 1.2 oz)   SpO2 97%   BMI 32.58 kg/m²     Body mass index is 32.58 kg/m².    Patient is well nourished and well developed.        Ortho Exam  Forward elevation 150  X rotation 70  Internal rotation L4  Mild tenderness at the AC joint  No tenderness at the medial border the scapula  Mildly positive Jolie and Mak    Imaging/Labs/EMG Reviewed:  Imaging Results (Last 24 Hours)     ** No results found for the last 24 hours. **          Assessment:  1. Right shoulder pain, unspecified chronicity    2. Incomplete tear of right rotator cuff, unspecified whether traumatic    3. Arthritis of right acromioclavicular joint        Plan:  1. Recommend over the counter anti-inflammatories for pain and/or swelling  2. AC joint " arthritis and incomplete re-tear status post rotator cuff repair--we have gone back and looked at the patient's MRI and he does have about a 50% partial-thickness bursal sided tear that is present in 2 spots, the footprint and near the musculotendinous junction.  He has significant AC joint arthritis as well.  We have told him as much and his significant other and I both agree that physical therapy would be helpful for him for global strengthening for his scapular musculature, his deltoid, and pec musculature.  Furthermore, education on what he should and should not do and what would likely exacerbate his underlying condition would be beneficial.  I will see him back as needed going forward.      Rolando Mosqueda MD  12/17/19  8:33 AM

## 2020-09-01 ENCOUNTER — OFFICE VISIT (OUTPATIENT)
Dept: ORTHOPEDIC SURGERY | Facility: CLINIC | Age: 55
End: 2020-09-01

## 2020-09-01 VITALS — OXYGEN SATURATION: 98 % | HEIGHT: 70 IN | BODY MASS INDEX: 31.5 KG/M2 | WEIGHT: 220 LBS | HEART RATE: 72 BPM

## 2020-09-01 DIAGNOSIS — M25.521 RIGHT ELBOW PAIN: Primary | ICD-10-CM

## 2020-09-01 DIAGNOSIS — M19.021 PRIMARY OSTEOARTHRITIS OF RIGHT ELBOW: ICD-10-CM

## 2020-09-01 DIAGNOSIS — M75.111 INCOMPLETE TEAR OF RIGHT ROTATOR CUFF, UNSPECIFIED WHETHER TRAUMATIC: ICD-10-CM

## 2020-09-01 DIAGNOSIS — M19.011 ARTHRITIS OF RIGHT ACROMIOCLAVICULAR JOINT: ICD-10-CM

## 2020-09-01 DIAGNOSIS — M25.511 RIGHT SHOULDER PAIN, UNSPECIFIED CHRONICITY: ICD-10-CM

## 2020-09-01 PROCEDURE — 99214 OFFICE O/P EST MOD 30 MIN: CPT | Performed by: ORTHOPAEDIC SURGERY

## 2020-09-01 PROCEDURE — 20605 DRAIN/INJ JOINT/BURSA W/O US: CPT | Performed by: ORTHOPAEDIC SURGERY

## 2020-09-01 RX ORDER — TRIAMCINOLONE ACETONIDE 40 MG/ML
20 INJECTION, SUSPENSION INTRA-ARTICULAR; INTRAMUSCULAR
Status: COMPLETED | OUTPATIENT
Start: 2020-09-01 | End: 2020-09-01

## 2020-09-01 RX ORDER — LIDOCAINE HYDROCHLORIDE 10 MG/ML
1 INJECTION, SOLUTION INFILTRATION; PERINEURAL
Status: COMPLETED | OUTPATIENT
Start: 2020-09-01 | End: 2020-09-01

## 2020-09-01 RX ADMIN — LIDOCAINE HYDROCHLORIDE 1 ML: 10 INJECTION, SOLUTION INFILTRATION; PERINEURAL at 12:33

## 2020-09-01 RX ADMIN — TRIAMCINOLONE ACETONIDE 20 MG: 40 INJECTION, SUSPENSION INTRA-ARTICULAR; INTRAMUSCULAR at 12:33

## 2020-09-01 NOTE — PROGRESS NOTES
[facility]        Subjective     CC: Pain of the Right Elbow and Follow-up (9 month f/u; Arthritis of right acromioclavicular joint )      HPI    Patricio Iverson is a 54 y.o. male who presents with new problem of: right elbow pain.  Onset: atraumatic and gradual in nature. The issue has been ongoing for 3 month(s). Pain is a 4/10 on the pain scale. Pain is described as dull and aching. Associated symptoms include pain, swelling, popping and grinding. The pain is worse with sleeping, working and leisure; exercising improves the pain. Previous treatments have included: bracing and home exercise program.    I have reviewed the following portions of the patient's history:History of Present Illnessand review of systems.      Patient is here today for new problem today regarding his right elbow.  This is been going on over the last 3 months.  He is building a garage getting help from some [facility] contractors.  He says that things are tolerable.  He says few weeks ago, his elbow was miserable with medial and lateral pain.  He has a history of arthritis of the elbow on the left side that got great relief after radio capitellar injections.  He rates the discomfort as 4 out of 10.  He has taken over-the-counter medications and rested the elbow.    Patient is also here for follow-up of his chronic right shoulder pain.  He has a partial-thickness rotator cuff tear and an MRI has been done in December 2019.  He has prior history of rotator cuff repair by Dr. Contreras.  He has done well with injections in the past by Joyce    Overall, patient's symptoms are slightly improved when compared to about a month ago.    ROS:    Constiutional:Pt denies fever, chills, nausea, or vomiting.  MSK:as above        Objective      Past Medical History  Past Medical History:   Diagnosis Date   • Abdominal hernia    • Arthritis    • GERD (gastroesophageal reflux disease)    • Headache    • [place] (hard of hearing)    •  "Hyperlipidemia    • Hypertriglyceridemia    • Migraine    • Seasonal allergies    • Sleep apnea with use of continuous positive airway pressure (CPAP)     compliant with machine    • SOBOE (shortness of breath on exertion)    • Wears glasses    • Wears partial dentures     \"dental implant\"  tops and most bottom - crowns and implants          Physical Exam  Pulse 72   Ht 177.8 cm (70\")   Wt 99.8 kg (220 lb)   SpO2 98%   BMI 31.57 kg/m²     Body mass index is 31.57 kg/m².    Patient is well nourished and well developed.        Ortho Exam  Right shoulder:  Forward elevation 150, external rotation 70, internal rotation L4.  He is tender at the AC joint.  Positive crossover.  4+ out of 5 supraspinatus with mild pain.  5 out of 5 external rotators and subscap without pain.    Right elbow: Patient has range of motion of .  He has full supination pronation.  He has some mild radiocapitellar joint line tenderness.  No crepitance with flexion but he does have mild crepitance with supination and pronation.    Imaging/Labs/EMG Reviewed:  Imaging Results (Last 24 Hours)     Procedure Component Value Units Date/Time    XR Elbow 3+ View Right [[id number]] Resulted:  09/01/20 1258     Updated:  09/01/20 1300    Narrative:       Right Elbow X-Ray    Indication: Pain    Views: AP, oblique and Lateral     Comparison: Left elbow 6/28/2018    Findings:  No fracture  No bony lesion  Normal soft tissues  Degenerative changes are most clearly demonstrated at the ulnohumeral   articulation and on the lateral view, the anterior articulation of the   distal humerus with the olecranon.    Impression: Degenerative arthritis right elbow.                XR Shoulder 2+ View Right [[id number]] Resulted:  09/01/20 1254     Updated:  09/01/20 1258    Narrative:       Right Shoulder X-Ray    Indication: Pain    Study:  AP, axillary lateral, and scapular Y views    Comparison: Right shoulder 3/7/2019    Findings:  No acute fractures are " visualized  No bony lesions are visualized.  Normal soft tissue appearance  AC joint: Severe hypertrophic joint space narrowing  Glenohumeral joint: Minimal joint space narrowing  Acromion type: 1  Change the greater tuberosity consistent with chronic cuff pathology      Impression:    No acute bony abnormalities noted  Change the greater tuberosity consistent with chronic pathology  Severe AC joint arthritis            Assessment    Assessment:  1. Right elbow pain    2. Right shoulder pain, unspecified chronicity    3. Incomplete tear of right rotator cuff, unspecified whether traumatic    4. Arthritis of right acromioclavicular joint    5. Primary osteoarthritis of right elbow        Plan:  1. Recommend over the counter anti-inflammatories for pain and/or swelling  2. Right elbow pain in the face of osteoarthritis--patient symptoms are currently under control.  I recommended observation for now.  Certainly given how well he is done with radiocapitellar injections in the past on the contralateral side, this can be repeated if necessary.  3. Partial-thickness right rotator cuff tear with AC joint arthritis--patient does not want anything aggressive done at this point.  He says things are manageable.  He is tender more at the top of the shoulder than anywhere else and has requested a corticosteroid injection.  This will be given today at the right AC joint.  Follow-up in about 3 to 4 months we will see how is doing overall    Procedure Note:    I discussed with the patient the potential benefits of performing a therapeutic injections as well as potential risks including but not limited to infection, swelling, pain, bleeding, bruising, nerve/vessel damage, skin color changes, transient elevation in blood glucose levels, and fat atrophy. After informed consent and after the areas were prepped with chlorhexadine soap, ethyl chloride was used to numb the skin. Via the superior approach, a combination of 1mL of 1%  lidocaine and 20mg of Kenalog were injected into the AC joint of the right shoulder. The patient tolerated the procedure well. There were no complications. A sterile dressing was placed over the injection sites.        Rolando Mosqueda MD  09/01/20  13:26      Dragon disclaimer:  Much of this encounter note is an electronic transcription/translation of spoken language to printed text. The electronic translation of spoken language may permit erroneous, or at times, nonsensical words or phrases to be inadvertently transcribed; Although I have reviewed the note for such errors, some may still exist.

## 2020-09-01 NOTE — PROGRESS NOTES
Procedure   Medium Joint Arthrocentesis: R acromioclavicular  Date/Time: 9/1/2020 12:33 PM  Consent given by: patient  Site marked: site marked  Timeout: Immediately prior to procedure a time out was called to verify the correct patient, procedure, equipment, support staff and site/side marked as required   Supporting Documentation  Indications: pain   Procedure Details  Location: shoulder - R acromioclavicular  Preparation: Patient was prepped and draped in the usual sterile fashion  Needle size: 25 G  Approach: superior  Medications administered: 20 mg triamcinolone acetonide 40 MG/ML; 1 mL lidocaine 1 %  Patient tolerance: patient tolerated the procedure well with no immediate complications

## 2021-05-09 ENCOUNTER — HOSPITAL ENCOUNTER (EMERGENCY)
Facility: HOSPITAL | Age: 56
Discharge: HOME | End: 2021-05-09
Attending: EMERGENCY MEDICINE | Admitting: EMERGENCY MEDICINE

## 2021-05-09 VITALS
WEIGHT: 225 LBS | SYSTOLIC BLOOD PRESSURE: 126 MMHG | OXYGEN SATURATION: 96 % | TEMPERATURE: 99 F | HEIGHT: 70 IN | BODY MASS INDEX: 32.21 KG/M2 | HEART RATE: 79 BPM | RESPIRATION RATE: 16 BRPM | DIASTOLIC BLOOD PRESSURE: 66 MMHG

## 2021-05-09 DIAGNOSIS — S51.812A FOREARM LACERATION, LEFT, INITIAL ENCOUNTER: Primary | ICD-10-CM

## 2021-05-09 PROCEDURE — 90715 TDAP VACCINE 7 YRS/> IM: CPT | Performed by: PHYSICIAN ASSISTANT

## 2021-05-09 PROCEDURE — 99282 EMERGENCY DEPT VISIT SF MDM: CPT

## 2021-05-09 PROCEDURE — 90471 IMMUNIZATION ADMIN: CPT | Performed by: PHYSICIAN ASSISTANT

## 2021-05-09 PROCEDURE — 25010000002 TDAP 5-2.5-18.5 LF-MCG/0.5 SUSPENSION: Performed by: PHYSICIAN ASSISTANT

## 2021-05-09 RX ORDER — LIDOCAINE HYDROCHLORIDE AND EPINEPHRINE 10; 10 MG/ML; UG/ML
10 INJECTION, SOLUTION INFILTRATION; PERINEURAL ONCE
Status: COMPLETED | OUTPATIENT
Start: 2021-05-09 | End: 2021-05-09

## 2021-05-09 RX ADMIN — LIDOCAINE HYDROCHLORIDE,EPINEPHRINE BITARTRATE 10 ML: 10; .01 INJECTION, SOLUTION INFILTRATION; PERINEURAL at 16:49

## 2021-05-09 RX ADMIN — TETANUS TOXOID, REDUCED DIPHTHERIA TOXOID AND ACELLULAR PERTUSSIS VACCINE, ADSORBED 0.5 ML: 5; 2.5; 8; 8; 2.5 SUSPENSION INTRAMUSCULAR at 16:51

## 2021-05-09 NOTE — ED NOTES
This RN wearing all appropriate PPE during patient encounter. Hand hygiene performed before and during entering room.       Елена Allan, ELAINE  05/09/21 0387

## 2021-05-09 NOTE — ED TRIAGE NOTES
Pt reports he was installing a lamp and it fell hitting his left arm causing a laceration. Pt arrived with his wound wrapped. Bleeding is controled at this time.    Pt was wearing a mask during assessment.  This RN wore appropriate PPE

## 2021-05-09 NOTE — ED PROVIDER NOTES
EMERGENCY DEPARTMENT ENCOUNTER    Room Number:  07/07  Date of encounter:  5/9/2021  PCP: Sarbjit Rebolledo MD  Historian: Patient, spouse      I used full protective equipment while examining this patient.  This includes face mask, gloves and protective eyewear.  I washed my hands before entering the room and immediately upon leaving the room      HPI:  Chief Complaint: Laceration  A complete HPI/ROS/PMH/PSH/SH/FH are unobtainable due to: Nothing    Context: Patricio Iverson is a 55 y.o. male who presents to the ED c/o lacerations to bilateral forearms.  Patient states he was hanging up a light fixture when it fell and broke.  Patient has a superficial laceration to his right anterior forearm, as well as a subcutaneous laceration to his left anterior mid forearm.  Patient denies any numbness or tingling to his distal forearm or hand.  He has painless full range of motion.  Patient states it was a large piece of glass that lacerated his arm and does not believe there is any chance of a foreign body.  Unknown last tetanus shot.  Patient is right-handed.  He denies any other injuries.    Review of Medical Records  I reviewed patient's last orthopedic office visit from 9/1/2020.  Patient seen for right elbow pain.    PAST MEDICAL HISTORY  Active Ambulatory Problems     Diagnosis Date Noted   • Primary osteoarthritis of right hip 01/28/2019   • HTN (hypertension) 03/19/2019   • HLD (hyperlipidemia) 03/19/2019   • FAIZAN on CPAP 03/19/2019   • Chronic pain 03/19/2019   • Chronic narcotic use 03/19/2019   • Status post total hip replacement, right 03/20/2019     Resolved Ambulatory Problems     Diagnosis Date Noted   • No Resolved Ambulatory Problems     Past Medical History:   Diagnosis Date   • Abdominal hernia    • Arthritis    • GERD (gastroesophageal reflux disease)    • Headache    • [place] (hard of hearing)    • Hyperlipidemia    • Hypertriglyceridemia    • Migraine    • Seasonal allergies    • Sleep apnea with use of  continuous positive airway pressure (CPAP)    • SOBOE (shortness of breath on exertion)    • Wears glasses    • Wears partial dentures          PAST SURGICAL HISTORY  Past Surgical History:   Procedure Laterality Date   • COLONOSCOPY     • DENTAL PROCEDURE  08/2018    Dental Implants   • ENDOSCOPY     • EPIDURAL      injection in shoulder    • HERNIA REPAIR      umbilical hernia on left side    • KNEE SURGERY Right 2010    scope   • SHOULDER SURGERY Right 2010    labrium and rotator cuff- couple times    • TONSILLECTOMY     • TOTAL HIP ARTHROPLASTY Right 3/19/2019    Procedure: TOTAL RIGHT HIP ARTHROPLASTY;  Surgeon: Anil Graham MD;  [facility];  Service: Orthopedics   • WISDOM TOOTH EXTRACTION      all 4 removed    • WRIST SURGERY Right          FAMILY HISTORY  Family History   Problem Relation Age of Onset   • No Known Problems Mother    • Cancer Father          SOCIAL HISTORY  Social History     Socioeconomic History   • Marital status: Single     Spouse name: Not on file   • Number of children: Not on file   • Years of education: Not on file   • Highest education level: Not on file   Tobacco Use   • Smoking status: Never Smoker   • Smokeless tobacco: Never Used   Substance and Sexual Activity   • Alcohol use: Yes     Alcohol/week: 14.0 standard drinks     Types: 7 Cans of beer, 7 Shots of liquor per week   • Drug use: No   • Sexual activity: Defer         ALLERGIES  Keflex [cephalexin]        REVIEW OF SYSTEMS  All systems reviewed and negative except for those discussed in HPI.       PHYSICAL EXAM    I have reviewed the triage vital signs and nursing notes.    ED Triage Vitals   Temp Heart Rate Resp BP SpO2   05/09/21 1540 05/09/21 1540 05/09/21 1540 05/09/21 1549 05/09/21 1540   99 °F (37.2 °C) 92 16 133/88 95 %      Temp src Heart Rate Source Patient Position BP Location FiO2 (%)   -- 05/09/21 1540 05/09/21 1549 05/09/21 1549 --    Monitor Lying Right arm        Physical Exam  GENERAL: Alert,  oriented, not distressed  HENT: head atraumatic, no nuchal rigidity  EYES: no scleral icterus, EOMI  CV: regular rhythm, regular rate, no murmur  RESPIRATORY: normal effort, CTA  MUSCULOSKELETAL: Subcutaneous laceration to left anterior mid forearm.  Neurovascular intact distally.  Full painless range of motion of left hand and wrist.  Superficial laceration to mid anterior right forearm.  NEURO: alert, moves all extremities, follows commands  SKIN: warm, dry    PROCEDURES    Laceration Repair    Date/Time: 5/9/2021 8:03 PM  Performed by: Jean Bazan PA  Authorized by: Juan David Briceño MD     Consent:     Consent obtained:  Verbal    Consent given by:  Patient  Anesthesia (see MAR for exact dosages):     Anesthesia method:  Local infiltration    Local anesthetic:  Lidocaine 1% WITH epi  Laceration details:     Location:  Shoulder/arm    Shoulder/arm location:  R lower arm    Length (cm):  4.2  Repair type:     Repair type:  Simple  Exploration:     Hemostasis achieved with:  Epinephrine    Wound extent: no nerve damage noted and no tendon damage noted      Contaminated: no    Treatment:     Area cleansed with:  Hibiclens    Amount of cleaning:  Standard  Skin repair:     Repair method:  Sutures    Suture size:  4-0    Suture material:  Nylon    Number of sutures:  8  Approximation:     Approximation:  Close  Post-procedure details:     Dressing:  Antibiotic ointment    Patient tolerance of procedure:  Tolerated well, no immediate complications          MEDICATIONS GIVEN IN ER    Medications   lidocaine 1% - EPINEPHrine 1:[id number] (XYLOCAINE W/EPI) 1 %-1:[id number] injection 10 mL (10 mL Injection Given 5/9/21 1649)   Tdap (BOOSTRIX) injection 0.5 mL (0.5 mL Intramuscular Given 5/9/21 1651)         PROGRESS, DATA ANALYSIS, CONSULTS, AND MEDICAL DECISION MAKING    All labs have been independently reviewed by me.  All radiology studies have been reviewed by me and discussed with radiologist dictating the report.   EKG's  independently viewed and interpreted by me.  Discussion below represents my analysis of pertinent findings related to patient's condition, differential diagnosis, treatment plan and final disposition.    I have discussed case with Dr. Briceño, emergency room physician.  He has performed his own bedside examination and agrees with treatment plan.    ED Course as of May 09 2005   Sun May 09, 2021   1550 Presents with laceration to left anterior forearm after accident at home.  Neurovascular intact distally.  No tendon injury.  Plan for wound closure and tetanus prophylaxis.    [EE]      ED Course User Index  [EE] Jean Bazan PA       AS OF 20:05 EDT VITALS:    BP - 126/66  HR - 79  TEMP - 99 °F (37.2 °C)  O2 SATS - 96%        DIAGNOSIS  Final diagnoses:   Forearm laceration, left, initial encounter         DISPOSITION  Discharged           Jean Bazan PA  05/09/21 2005

## 2021-05-11 NOTE — ED PROVIDER NOTES
MD ATTESTATION NOTE    The MOIZ and I have discussed this patient's history, physical exam, and treatment plan.  I have reviewed the documentation and personally had a face to face interaction with the patient. I affirm the documentation and agree with the treatment and plan.  The attached note describes my personal findings.    55-year-old gentleman presents with lacerations to his forearms.  This occurred when a light fixture fell and broke.  The largest laceration is on the left forearm, has irregular margins and invades the subcutaneous tissues.  I see no evidence of neurovascular or tendinous injury, and the wound will be explored and repaired by the physician assistant.     Juan David Briceño MD  05/10/21 2618

## 2021-06-15 ENCOUNTER — OFFICE VISIT (OUTPATIENT)
Dept: ORTHOPEDIC SURGERY | Facility: CLINIC | Age: 56
End: 2021-06-15

## 2021-06-15 VITALS
WEIGHT: 224.87 LBS | BODY MASS INDEX: 32.19 KG/M2 | HEIGHT: 70 IN | HEART RATE: 88 BPM | DIASTOLIC BLOOD PRESSURE: 90 MMHG | SYSTOLIC BLOOD PRESSURE: 180 MMHG

## 2021-06-15 DIAGNOSIS — Z98.890 S/P COMPLETE REPAIR OF ROTATOR CUFF: ICD-10-CM

## 2021-06-15 DIAGNOSIS — M25.511 RIGHT SHOULDER PAIN, UNSPECIFIED CHRONICITY: Primary | ICD-10-CM

## 2021-06-15 DIAGNOSIS — M19.011 ARTHRITIS OF RIGHT ACROMIOCLAVICULAR JOINT: ICD-10-CM

## 2021-06-15 DIAGNOSIS — G89.29 CHRONIC PAIN OF LEFT KNEE: ICD-10-CM

## 2021-06-15 DIAGNOSIS — M25.562 CHRONIC PAIN OF LEFT KNEE: ICD-10-CM

## 2021-06-15 DIAGNOSIS — M75.111 INCOMPLETE TEAR OF RIGHT ROTATOR CUFF, UNSPECIFIED WHETHER TRAUMATIC: ICD-10-CM

## 2021-06-15 DIAGNOSIS — M17.12 PRIMARY OSTEOARTHRITIS OF LEFT KNEE: ICD-10-CM

## 2021-06-15 PROCEDURE — 20605 DRAIN/INJ JOINT/BURSA W/O US: CPT | Performed by: ORTHOPAEDIC SURGERY

## 2021-06-15 PROCEDURE — 99214 OFFICE O/P EST MOD 30 MIN: CPT | Performed by: ORTHOPAEDIC SURGERY

## 2021-06-15 PROCEDURE — 20610 DRAIN/INJ JOINT/BURSA W/O US: CPT | Performed by: ORTHOPAEDIC SURGERY

## 2021-06-15 RX ORDER — LIDOCAINE HYDROCHLORIDE 10 MG/ML
3 INJECTION, SOLUTION EPIDURAL; INFILTRATION; INTRACAUDAL; PERINEURAL
Status: COMPLETED | OUTPATIENT
Start: 2021-06-15 | End: 2021-06-15

## 2021-06-15 RX ORDER — LIDOCAINE HYDROCHLORIDE 10 MG/ML
1 INJECTION, SOLUTION INFILTRATION; PERINEURAL
Status: COMPLETED | OUTPATIENT
Start: 2021-06-15 | End: 2021-06-15

## 2021-06-15 RX ORDER — TRIAMCINOLONE ACETONIDE 40 MG/ML
20 INJECTION, SUSPENSION INTRA-ARTICULAR; INTRAMUSCULAR
Status: COMPLETED | OUTPATIENT
Start: 2021-06-15 | End: 2021-06-15

## 2021-06-15 RX ORDER — TRIAMCINOLONE ACETONIDE 40 MG/ML
40 INJECTION, SUSPENSION INTRA-ARTICULAR; INTRAMUSCULAR
Status: COMPLETED | OUTPATIENT
Start: 2021-06-15 | End: 2021-06-15

## 2021-06-15 RX ADMIN — LIDOCAINE HYDROCHLORIDE 3 ML: 10 INJECTION, SOLUTION EPIDURAL; INFILTRATION; INTRACAUDAL; PERINEURAL at 16:18

## 2021-06-15 RX ADMIN — LIDOCAINE HYDROCHLORIDE 1 ML: 10 INJECTION, SOLUTION INFILTRATION; PERINEURAL at 16:17

## 2021-06-15 RX ADMIN — TRIAMCINOLONE ACETONIDE 40 MG: 40 INJECTION, SUSPENSION INTRA-ARTICULAR; INTRAMUSCULAR at 16:18

## 2021-06-15 RX ADMIN — TRIAMCINOLONE ACETONIDE 20 MG: 40 INJECTION, SUSPENSION INTRA-ARTICULAR; INTRAMUSCULAR at 16:17

## 2021-06-15 NOTE — PROGRESS NOTES
Procedure   Medium Joint Arthrocentesis: R acromioclavicular  Date/Time: 6/15/2021 4:17 PM  Consent given by: patient  Site marked: site marked  Timeout: Immediately prior to procedure a time out was called to verify the correct patient, procedure, equipment, support staff and site/side marked as required   Supporting Documentation  Indications: pain   Procedure Details  Location: shoulder - R acromioclavicular  Preparation: Patient was prepped and draped in the usual sterile fashion  Needle size: 25 G  Approach: Anterior.  Medications administered: 20 mg triamcinolone acetonide 40 MG/ML; 1 mL lidocaine 1 %  Patient tolerance: patient tolerated the procedure well with no immediate complications    Large Joint Arthrocentesis: L knee  Date/Time: 6/15/2021 4:18 PM  Consent given by: patient  Site marked: site marked  Timeout: Immediately prior to procedure a time out was called to verify the correct patient, procedure, equipment, support staff and site/side marked as required   Supporting Documentation  Indications: pain   Procedure Details  Location: knee - L knee  Preparation: Patient was prepped and draped in the usual sterile fashion  Needle size: 25 G  Approach: anterolateral  Medications administered: 3 mL lidocaine PF 1% 1 %; 40 mg triamcinolone acetonide 40 MG/ML  Patient tolerance: patient tolerated the procedure well with no immediate complications

## 2021-06-15 NOTE — PROGRESS NOTES
"    [facility]        Subjective     CC: Follow-up (9 month f/u; Arthritis of right acromioclavicular joint )      FADIA Iverson is a 55 y.o. male.  Patient is here today for follow-up of his right shoulder and left knee.  Is been quite sometime since he has been seen in the left knee.  He did well after right knee arthroscopy in the past.  He tells me his left knee bothers him from time to time.  He has occasional episodes where the knee wants to give way.  The knee pain is medial in location.    Last time I saw the patient for his right shoulder was in September 2020 we injected his AC joint and he did well after that.  He has done a lot of work with the shoulder and it aches him from time to time.  He has had a prior rotator cuff repair by Dr. Contreras in the right shoulder.    Overall, patient's symptoms are as above    ROS:    Constiutional:Pt denies fever, chills, nausea, or vomiting.  MSK:as above        Objective      Past Medical History  Past Medical History:   Diagnosis Date   • Abdominal hernia    • Arthritis    • GERD (gastroesophageal reflux disease)    • Headache    • [place] (hard of hearing)    • Hyperlipidemia    • Hypertriglyceridemia    • Migraine    • Seasonal allergies    • Sleep apnea with use of continuous positive airway pressure (CPAP)     compliant with machine    • SOBOE (shortness of breath on exertion)    • Wears glasses    • Wears partial dentures     \"dental implant\"  tops and most bottom - crowns and implants          Physical Exam  /90   Pulse 88   Ht 177.8 cm (70\")   Wt 102 kg (224 lb 13.9 oz)   BMI 32.27 kg/m²     Body mass index is 32.27 kg/m².    Patient is well nourished and well developed.        Ortho Exam  Musculoskeletal   Upper Extremity   Right Shoulder       Strength and Tone:    Supraspinatus -5 out of 5 with minimal pain    External Rotators-5/5    Infraspinatus - 5/5    Subscapularis - 5/5    Deltoid - 5/5     Range of Motion    "    Right Shoulder:    Internal Rotation: ROM - L4    External Rotation: AROM - 70 degrees    Elevation through flexion: AROM - 140 degrees     AC joint:  tender to palpation    AC joint: Positive crossover    Dave and Neer positive          Musculoskeletal:  Global Assessment:  Overall assessment of Lower Extremity Muscle Strength and Tone:  Left quadriceps--5/5   Left hamstrings--5/5       Left tibialis anterior--5/5  Left gastroc-soleus--5/5  Left EHL --5/5    Lower Extremity:    Inspection and Palpation:  Left knee:  Tenderness:  Over the medial joint line and moderate severity  Effusion:  1+  Crepitus:  Positive  Pulses:  2+  Ecchymosis:  None  Warmth:  None     ROM:  Left:  Extension: 5     Flexion:120    Instability:    Left:    Lachman Test:  Negative   Varus stress test negative  Valgus stress test negative    Deformities/Malalignments/Discrepancies:    Left:  Genu Varum     Functional Testing:  Ritika's test:  Negative  Patella grind test:  Positive  Q-angle:  normal        Imaging/Labs/EMG Reviewed:  Imaging Results (Last 24 Hours)     ** No results found for the last 24 hours. **      We have x-ray the patient's left knee today in the office.  He has severe medial compartment and moderate patellofemoral compartment arthritis.      Assessment    Assessment:  1. Right shoulder pain, unspecified chronicity    2. Incomplete tear of right rotator cuff, unspecified whether traumatic    3. Arthritis of right acromioclavicular joint    4. S/P complete repair of rotator cuff    5. Primary osteoarthritis of left knee    6. Chronic pain of left knee        Plan:  1. Recommend over the counter anti-inflammatories for pain and/or swelling  2. Right AC joint arthritis and chronic right shoulder pain with partial-thickness rotator cuff tear less than 50% involvement--historically, the patient is done well with a steroid injection in the AC joint.  Last injection was September 2020.  AC joint junction will be given  today.  3. Left knee arthritis--severe medial moderate patellofemoral.  Plan will be for steroid injection today and we will go ahead and order a viscosupplementation and see him back in about a month to initiate the Visco supplement if necessary.    Procedure Note:    I discussed with the patient the potential benefits of performing a therapeutic injections as well as potential risks including but not limited to infection, swelling, pain, bleeding, bruising, nerve/vessel damage, skin color changes, transient elevation in blood glucose levels, and fat atrophy. After informed consent and after the areas were prepped with chlorhexadine soap, ethyl chloride was used to numb the skin. Via the anterolateral approach, 3mL of 1% lidocaine followed by 40mg of Kenalog were each injected into the left knee joint. The patient tolerated the procedure well. There were no complications. A sterile dressing was placed over the injection sites.      Procedure Note:    I discussed with the patient the potential benefits of performing a therapeutic injections as well as potential risks including but not limited to infection, swelling, pain, bleeding, bruising, nerve/vessel damage, skin color changes, transient elevation in blood glucose levels, and fat atrophy. After informed consent and after the areas were prepped with chlorhexadine soap, ethyl chloride was used to numb the skin. Via the superior approach, a combination of 1mL of 1% lidocaine and 20mg of Kenalog were injected into the AC joint of the right shoulder. The patient tolerated the procedure well. There were no complications. A sterile dressing was placed over the injection sites.          Rolando Mosqueda MD  06/15/21  18:21 EDT      Dragon disclaimer:  Much of this encounter note is an electronic transcription/translation of spoken language to printed text. The electronic translation of spoken language may permit erroneous, or at times, nonsensical words or  phrases to be inadvertently transcribed; Although I have reviewed the note for such errors, some may still exist.

## 2021-07-29 ENCOUNTER — CLINICAL SUPPORT (OUTPATIENT)
Dept: ORTHOPEDIC SURGERY | Facility: CLINIC | Age: 56
End: 2021-07-29

## 2021-07-29 DIAGNOSIS — M17.12 PRIMARY OSTEOARTHRITIS OF LEFT KNEE: Primary | ICD-10-CM

## 2021-07-29 PROCEDURE — 20610 DRAIN/INJ JOINT/BURSA W/O US: CPT | Performed by: ORTHOPAEDIC SURGERY

## 2021-07-29 RX ORDER — LEVOCETIRIZINE DIHYDROCHLORIDE 5 MG/1
5 TABLET, FILM COATED ORAL 2 TIMES DAILY PRN
COMMUNITY
Start: 2021-07-19

## 2021-07-29 RX ORDER — LIDOCAINE HYDROCHLORIDE 10 MG/ML
3 INJECTION, SOLUTION EPIDURAL; INFILTRATION; INTRACAUDAL; PERINEURAL
Status: COMPLETED | OUTPATIENT
Start: 2021-07-29 | End: 2021-07-29

## 2021-07-29 RX ORDER — AZELASTINE 1 MG/ML
SPRAY, METERED NASAL AS NEEDED
COMMUNITY
Start: 2021-05-22

## 2021-07-29 RX ORDER — TAMSULOSIN HYDROCHLORIDE 0.4 MG/1
1 CAPSULE ORAL NIGHTLY
COMMUNITY
Start: 2021-07-10

## 2021-07-29 RX ORDER — MELOXICAM 15 MG/1
TABLET ORAL
COMMUNITY
Start: 2021-07-10

## 2021-07-29 RX ADMIN — LIDOCAINE HYDROCHLORIDE 3 ML: 10 INJECTION, SOLUTION EPIDURAL; INFILTRATION; INTRACAUDAL; PERINEURAL at 08:36

## 2021-07-29 NOTE — PROGRESS NOTES
Procedure   Large Joint Arthrocentesis: L knee  Date/Time: 7/29/2021 8:36 AM  Consent given by: patient  Site marked: site marked  Timeout: Immediately prior to procedure a time out was called to verify the correct patient, procedure, equipment, support staff and site/side marked as required   Supporting Documentation  Indications: pain   Procedure Details  Location: knee - L knee  Preparation: Patient was prepped and draped in the usual sterile fashion  Needle size: 23 G  Approach: anterolateral  Medications administered: 30 mg Hyaluronan 30 MG/2ML; 3 mL lidocaine PF 1% 1 %  Patient tolerance: patient tolerated the procedure well with no immediate complications

## 2021-07-29 NOTE — PROGRESS NOTES
Procedure Note:    I discussed with the patient the potential benefits of performing a therapeutic injections as well as potential risks including but not limited to infection, swelling, pain, bleeding, bruising, nerve/vessel damage, skin color changes, transient elevation in blood glucose levels, and fat atrophy. After informed consent and after the areas were prepped with chlorhexadine soap, ethyl chloride was used to numb the skin. Via the superiorlateral approach, 3mL of 1% lidocaine followed by Orthovisc No. 1 were each injected into the left knee joint. The patient tolerated the procedure well. There were no complications. A sterile dressing was placed over the injection sites.

## 2021-08-12 ENCOUNTER — CLINICAL SUPPORT (OUTPATIENT)
Dept: ORTHOPEDIC SURGERY | Facility: CLINIC | Age: 56
End: 2021-08-12

## 2021-08-12 DIAGNOSIS — M17.12 PRIMARY OSTEOARTHRITIS OF LEFT KNEE: Primary | ICD-10-CM

## 2021-08-12 PROCEDURE — 20610 DRAIN/INJ JOINT/BURSA W/O US: CPT | Performed by: ORTHOPAEDIC SURGERY

## 2021-08-12 RX ORDER — LIDOCAINE HYDROCHLORIDE 10 MG/ML
3 INJECTION, SOLUTION EPIDURAL; INFILTRATION; INTRACAUDAL; PERINEURAL
Status: COMPLETED | OUTPATIENT
Start: 2021-08-12 | End: 2021-08-12

## 2021-08-12 RX ADMIN — LIDOCAINE HYDROCHLORIDE 3 ML: 10 INJECTION, SOLUTION EPIDURAL; INFILTRATION; INTRACAUDAL; PERINEURAL at 08:22

## 2021-08-12 NOTE — PROGRESS NOTES
Procedure   Large Joint Arthrocentesis: L knee  Date/Time: 8/12/2021 8:22 AM  Consent given by: patient  Site marked: site marked  Timeout: Immediately prior to procedure a time out was called to verify the correct patient, procedure, equipment, support staff and site/side marked as required   Supporting Documentation  Indications: pain   Procedure Details  Location: knee - L knee  Preparation: Patient was prepped and draped in the usual sterile fashion  Needle size: 23 G  Approach: anterolateral  Medications administered: 30 mg Hyaluronan 30 MG/2ML; 3 mL lidocaine PF 1% 1 %  Patient tolerance: patient tolerated the procedure well with no immediate complications

## 2021-08-12 NOTE — PROGRESS NOTES
Procedure Note:    I discussed with the patient the potential benefits of performing a therapeutic injections as well as potential risks including but not limited to infection, swelling, pain, bleeding, bruising, nerve/vessel damage, skin color changes, transient elevation in blood glucose levels, and fat atrophy. After informed consent and after the areas were prepped with chlorhexadine soap, ethyl chloride was used to numb the skin. Via the superiorlateral approach, 3mL of 1% lidocaine followed by Orthovisc # 2 were each injected into the left knee joint. The patient tolerated the procedure well. There were no complications. A sterile dressing was placed over the injection sites.

## 2021-08-19 ENCOUNTER — CLINICAL SUPPORT (OUTPATIENT)
Dept: ORTHOPEDIC SURGERY | Facility: CLINIC | Age: 56
End: 2021-08-19

## 2021-08-19 DIAGNOSIS — M17.12 PRIMARY OSTEOARTHRITIS OF LEFT KNEE: Primary | ICD-10-CM

## 2021-08-19 PROCEDURE — 20610 DRAIN/INJ JOINT/BURSA W/O US: CPT | Performed by: ORTHOPAEDIC SURGERY

## 2021-08-19 RX ORDER — LIDOCAINE HYDROCHLORIDE 10 MG/ML
3 INJECTION, SOLUTION EPIDURAL; INFILTRATION; INTRACAUDAL; PERINEURAL
Status: COMPLETED | OUTPATIENT
Start: 2021-08-19 | End: 2021-08-19

## 2021-08-19 RX ADMIN — LIDOCAINE HYDROCHLORIDE 3 ML: 10 INJECTION, SOLUTION EPIDURAL; INFILTRATION; INTRACAUDAL; PERINEURAL at 08:32

## 2021-08-19 NOTE — PROGRESS NOTES
Procedure Note:    I discussed with the patient the potential benefits of performing a therapeutic injections as well as potential risks including but not limited to infection, swelling, pain, bleeding, bruising, nerve/vessel damage, skin color changes, transient elevation in blood glucose levels, and fat atrophy. After informed consent and after the areas were prepped with chlorhexadine soap, ethyl chloride was used to numb the skin. Via the superiorlateral approach, 3mL of 1% lidocaine followed by Orthovisc No. 3 were each injected into the left knee joint. The patient tolerated the procedure well. There were no complications. A sterile dressing was placed over the injection sites.

## 2021-08-19 NOTE — PROGRESS NOTES
Procedure   Large Joint Arthrocentesis: L knee  Date/Time: 8/19/2021 8:32 AM  Consent given by: patient  Site marked: site marked  Timeout: Immediately prior to procedure a time out was called to verify the correct patient, procedure, equipment, support staff and site/side marked as required   Supporting Documentation  Indications: pain   Procedure Details  Location: knee - L knee  Preparation: Patient was prepped and draped in the usual sterile fashion  Needle size: 23 G  Approach: anterolateral  Medications administered: 30 mg Hyaluronan 30 MG/2ML; 3 mL lidocaine PF 1% 1 %  Patient tolerance: patient tolerated the procedure well with no immediate complications

## 2021-09-01 ENCOUNTER — OFFICE VISIT (OUTPATIENT)
Dept: ORTHOPEDIC SURGERY | Facility: CLINIC | Age: 56
End: 2021-09-01

## 2021-09-01 VITALS
DIASTOLIC BLOOD PRESSURE: 89 MMHG | HEIGHT: 70 IN | SYSTOLIC BLOOD PRESSURE: 143 MMHG | HEART RATE: 68 BPM | BODY MASS INDEX: 32.67 KG/M2 | WEIGHT: 228.2 LBS

## 2021-09-01 DIAGNOSIS — Z09 POSTOPERATIVE EXAMINATION: ICD-10-CM

## 2021-09-01 DIAGNOSIS — Z96.641 STATUS POST TOTAL REPLACEMENT OF RIGHT HIP: Primary | ICD-10-CM

## 2021-09-01 PROCEDURE — 99213 OFFICE O/P EST LOW 20 MIN: CPT | Performed by: ORTHOPAEDIC SURGERY

## 2021-09-01 ASSESSMENT — HOOS JR
HOOS JR SCORE: 0
HOOS JR SCORE: 100

## 2021-09-01 NOTE — PROGRESS NOTES
"    [facility] [place] [facility] [place]    Subjective     Chief Complaint   Patient presents with   • Follow-up     2 years follow up; 2 years and 5 months status post total hip arthroplasty 3/19/19        HPI    Patricio Iverson is a 55 y.o. male who follows up for his right total hip arthroplasty done on 03/19/2019.    His right hip has been feeling \"awesome\" and it is 100% better compared to before surgery. He will have some pain with the weather changing along with 1 week of pain if he spreads his lower extremities while working. 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    I have reviewed the following portions of the patient's history and agree with: History of Present Illness and Review of Systems    Patient Active Problem List   Diagnosis   • Primary osteoarthritis of right hip   • HTN (hypertension)   • HLD (hyperlipidemia)   • FAIZAN on CPAP   • Chronic pain   • Chronic narcotic use   • Status post total hip replacement, right     Past Medical History:   Diagnosis Date   • Abdominal hernia    • Arthritis    • GERD (gastroesophageal reflux disease)    • Headache    • [place] (hard of hearing)    • Hyperlipidemia    • Hypertriglyceridemia    • Migraine    • Seasonal allergies    • Sleep apnea with use of continuous positive airway pressure (CPAP)     compliant with machine    • SOBOE (shortness of breath on exertion)    • Wears glasses    • Wears partial dentures     \"dental implant\"  tops and most bottom - crowns and implants       Past Surgical History:   Procedure Laterality Date   • COLONOSCOPY     • DENTAL PROCEDURE  08/2018    Dental Implants   • ENDOSCOPY     • EPIDURAL      injection in shoulder    • HERNIA REPAIR      umbilical hernia on left side    • KNEE SURGERY Right 2010    scope   • SHOULDER SURGERY Right 2010    labrium and rotator cuff- couple times    • TONSILLECTOMY     • TOTAL HIP ARTHROPLASTY Right 3/19/2019    Procedure: TOTAL RIGHT HIP ARTHROPLASTY;  Surgeon: Anil Graham, " MD;  Location: [facility];  Service: Orthopedics   • WISDOM TOOTH EXTRACTION      all 4 removed    • WRIST SURGERY Right       Family History   Problem Relation Age of Onset   • No Known Problems Mother    • Cancer Father      Social History     Socioeconomic History   • Marital status: Single     Spouse name: Not on file   • Number of children: Not on file   • Years of education: Not on file   • Highest education level: Not on file   Tobacco Use   • Smoking status: Never Smoker   • Smokeless tobacco: Never Used   Substance and Sexual Activity   • Alcohol use: Yes     Alcohol/week: 14.0 standard drinks     Types: 7 Cans of beer, 7 Shots of liquor per week   • Drug use: No   • Sexual activity: Defer      Current Outpatient Medications on File Prior to Visit   Medication Sig Dispense Refill   • ALLERGY SERUM INJECTION Inject  under the skin into the appropriate area as directed 1 (One) Time.     • ALPRAZolam (XANAX) 0.5 MG tablet Take 0.5 mg by mouth 3 (Three) Times a Day As Needed.  2   • amLODIPine (NORVASC) 5 MG tablet Take 5 mg by mouth Daily.  3   • aspirin  MG EC tablet Take 1 tablet by mouth Daily. 30 tablet 0   • atorvastatin (LIPITOR) 40 MG tablet Take 40 mg by mouth Daily.  3   • azelastine (ASTELIN) 0.1 % nasal spray As Needed.     • desloratadine (CLARINEX) 5 MG tablet Take 5 mg by mouth Daily.  5   • diclofenac (VOLTAREN) 75 MG EC tablet Take 75 mg by mouth Daily.  11   • docusate sodium 100 MG capsule Take 100 mg by mouth 2 (Two) Times a Day As Needed for Constipation. 40 each 0   • fenofibrate 160 MG tablet Take 160 mg by mouth Every Night.  3   • gabapentin (NEURONTIN) 800 MG tablet Take 400 mg by mouth 2 (Two) Times a Day.  4   • ipratropium (ATROVENT) 0.06 % nasal spray 1 spray into the nostril(s) as directed by provider 2 (Two) Times a Day As Needed for Rhinitis.     • levocetirizine (XYZAL) 5 MG tablet Take 5 mg by mouth 2 (Two) Times a Day As Needed.     • lisinopril (PRINIVIL,ZESTRIL) 20 MG  "tablet Take 20 mg by mouth Daily.  3   • Melatonin 10 MG capsule Take 2 capsules by mouth Every Night.     • meloxicam (MOBIC) 15 MG tablet TAKE ONE TABLET BY MOUTH ONCE DAILY AS NEEDED FOR ARTHRITIS PAIN     • montelukast (SINGULAIR) 10 MG tablet Take 10 mg by mouth Every Night.  5   • Multiple Vitamins-Minerals (MULTIVITAMIN ADULT PO) Take 1 capsule by mouth Daily.     • Omega-3 Fatty Acids (FISH OIL) 1200 MG capsule delayed-release Take 1 capsule by mouth 2 (Two) Times a Day. 1290     • omeprazole (priLOSEC) 20 MG capsule Take 20 mg by mouth Daily.     • sildenafil (REVATIO) 20 MG tablet TAKE UP TO 3 TABLETS BY MOUTH EVERY DAY AS NEEDED  5   • tamsulosin (FLOMAX) 0.4 MG capsule 24 hr capsule Take 1 capsule by mouth Every Night.     • Testosterone Cypionate (DEPOTESTOTERONE CYPIONATE) 200 MG/ML injection INJECT 1 ML IM EVERY 14 DAYS-  1   • [DISCONTINUED] HYDROcodone-acetaminophen (NORCO)  MG per tablet Take 1 tablet by mouth 4 (Four) Times a Day As Needed.  0     No current facility-administered medications on file prior to visit.      Allergies   Allergen Reactions   • Keflex [Cephalexin] Hives        Review of Systems   Constitutional: Negative.    HENT: Negative.    Eyes: Negative.    Respiratory: Negative.    Cardiovascular: Negative.    Gastrointestinal: Negative.    Endocrine: Negative.    Genitourinary: Negative.    Musculoskeletal: Positive for arthralgias.   Skin: Negative.    Allergic/Immunologic: Negative.    Neurological: Negative.    Hematological: Negative.    Psychiatric/Behavioral: Negative.         Objective      Physical Exam  /89   Pulse 68   Ht 177.8 cm (70\")   Wt 104 kg (228 lb 3.2 oz)   BMI 32.74 kg/m²     Body mass index is 32.74 kg/m².    General:   Mental Status:  Alert   Appearance: Cooperative, in no acute distress   Build and Nutrition: Well-nourished well-developed male   Orientation: Alert and oriented to person, place and time   Posture: Normal   Gait: " Nonantalgic    Integument  • Right hip: Wound is well-healed with no signs of infection    Lower Extremities  • Right Hip:  • Tenderness: None  • Swelling: None  • Crepitus: None  • Range of motion:  • External rotation: 30°  • Internal rotation: 30°  • Flexion: 100°  • Extension: 0°  • Deformities: None  • Functional Testing:  • Stinchfield Test: Negative  • No leg length discrepancy.        Imaging/Studies  Imaging Results (Last 24 Hours)     Procedure Component Value Units Date/Time    XR Hip With or Without Pelvis 2 - 3 View Right [[id number]] Resulted: 09/01/21 0901     Updated: 09/01/21 0902    Narrative:      Right Hip Radiographs  Indication: status-post right total hip arthroplasty  Views: low AP pelvis and lateral of the right hip    Comparison: no change compared to prior study, 9/18/2019    Findings:   The components are well aligned, with no signs of loosening or failure.            Assessment and Plan     Diagnoses and all orders for this visit:    1. Status post total replacement of right hip (Primary)  -     XR Hip With or Without Pelvis 2 - 3 View Right    2. Postoperative examination        1. Status post total replacement of right hip    2. Postoperative examination        I have reviewed my findings with the patient today. He is doing well postoperatively and is pleased with his results.  I will see him back at the 5-year neal postoperatively, but sooner for any problems.    Return in about 133 weeks (around 3/20/2024).      Transcribed from ambient dictation for Anil Graham MD by Tong Camilo.  09/01/21   10:04 EDT    I have personally performed the services described in this document as transcribed by the above individual, and it is both accurate and complete.  Anil Graham MD  9/1/2021  22:06 EDT

## 2021-11-16 ENCOUNTER — OFFICE VISIT (OUTPATIENT)
Dept: ORTHOPEDIC SURGERY | Facility: CLINIC | Age: 56
End: 2021-11-16

## 2021-11-16 VITALS
BODY MASS INDEX: 33.21 KG/M2 | HEIGHT: 70 IN | SYSTOLIC BLOOD PRESSURE: 166 MMHG | WEIGHT: 232 LBS | DIASTOLIC BLOOD PRESSURE: 86 MMHG | HEART RATE: 97 BPM

## 2021-11-16 DIAGNOSIS — M17.12 PRIMARY OSTEOARTHRITIS OF LEFT KNEE: ICD-10-CM

## 2021-11-16 DIAGNOSIS — M19.011 ARTHRITIS OF RIGHT ACROMIOCLAVICULAR JOINT: Primary | ICD-10-CM

## 2021-11-16 DIAGNOSIS — M75.111 INCOMPLETE TEAR OF RIGHT ROTATOR CUFF, UNSPECIFIED WHETHER TRAUMATIC: ICD-10-CM

## 2021-11-16 DIAGNOSIS — M25.511 RIGHT SHOULDER PAIN, UNSPECIFIED CHRONICITY: ICD-10-CM

## 2021-11-16 PROCEDURE — 99213 OFFICE O/P EST LOW 20 MIN: CPT | Performed by: ORTHOPAEDIC SURGERY

## 2021-11-16 RX ORDER — HYDROCODONE BITARTRATE AND ACETAMINOPHEN 10; 325 MG/1; MG/1
1 TABLET ORAL EVERY 6 HOURS PRN
COMMUNITY
Start: 2021-10-26

## 2021-11-16 NOTE — PROGRESS NOTES
"    [facility] [place] [facility] Note        Subjective     CC: Follow-up (1.)3 month f/u Primary osteoarthritis of left knee, completed Orthovisc 8/12/21, last cortisone injection 8/19/21, discuss repeat cortisone vs PRP injections 2.) Right Shoulder pain flare up x 6 weeks after pulling up fence posts)      FADIA    Patricio Iverson is a 56 y.o. male.  Patient turns the office today for worsening right shoulder pain and left knee pain.  His left knee was injected with Orthovisc in the series completed in August 2021.  He has gotten reasonably good relief.  He has not gotten great relief from steroid historically.  With regards to his right shoulder, 2 weeks ago he was carrying some heavy objects and had severe pain at the top of the right shoulder.  He says this is different than where he has hurt in the past.  This is gotten little bit better but he still has pain at night.  He is a little bit concerned overall.    Overall, patient's symptoms are as above.    ROS:    Constiutional:Pt denies fever, chills, nausea, or vomiting.  MSK:as above        Objective      Past Medical History  Past Medical History:   Diagnosis Date   • Abdominal hernia    • Arthritis    • GERD (gastroesophageal reflux disease)    • Headache    • [place] (hard of hearing)    • Hyperlipidemia    • Hypertriglyceridemia    • Migraine    • Seasonal allergies    • Sleep apnea with use of continuous positive airway pressure (CPAP)     compliant with machine    • SOBOE (shortness of breath on exertion)    • Wears glasses    • Wears partial dentures     \"dental implant\"  tops and most bottom - crowns and implants          Physical Exam  /86   Pulse 97   Ht 177.8 cm (70\")   Wt 105 kg (232 lb)   BMI 33.29 kg/m²     Body mass index is 33.29 kg/m².    Patient is well nourished and well developed.        Ortho Exam  Musculoskeletal   Upper Extremity   Right Shoulder       Strength and Tone:    Supraspinatus -4+ out of 5 with pain    External " Rotators-5/5    Infraspinatus - 5/5    Subscapularis - 5/5    Deltoid - 5/5     Range of Motion      Right Shoulder:    Internal Rotation: ROM - L4    External Rotation: AROM - 70 degrees    Elevation through flexion: AROM - 140 degrees     AC joint:  tender to palpation    AC joint: Positive crossover      Imaging/Labs/EMG Reviewed:  Imaging Results (Last 24 Hours)     Procedure Component Value Units Date/Time    XR Shoulder 2+ View Right [[id number]] Resulted: 11/16/21 1339     Updated: 11/16/21 1341    Narrative:      Right Shoulder X-Ray    Indication: Pain    Study:  AP, axillary lateral, and scapular Y views    Comparison: Right shoulder 9/1/2020    Findings:  No acute fractures are visualized  No bony lesions are visualized.  Normal soft tissue appearance  AC joint: Severe hypertrophic joint space narrowing.  There are cystic   changes noted at the distal clavicle.  Glenohumeral joint: Mild joint space narrowing  Acromion type: 2  Change at greater tuberosity consistent with chronic cuff pathology      Impression:    No acute bony abnormalities noted  Changes at greater tuberosity consistent with chronic cuff pathology  Mild glenohumeral joint space narrowing  Severe hypertrophic AC joint space narrowing with cystic changes noted at   the distal clavicle              Assessment    Assessment:  1. Arthritis of right acromioclavicular joint    2. Primary osteoarthritis of left knee    3. Right shoulder pain, unspecified chronicity    4. Incomplete tear of right rotator cuff, unspecified whether traumatic        Plan:  1. Recommend over the counter anti-inflammatories for pain and/or swelling  2. Is been 2 years since his last MRI of the shoulder.  I suggested we update this.  I am concerned about the osteolysis noted at the distal clavicle.  Hopefully this does not represent any type of malignancy.  An MRI will be requested.  I will see him back to review that study.  Hopefully we can get the study done  expediently.  3. Osteoarthritis left knee--patient would like to try course of PRP.  We will see him back for that once he returns for the MRI of the shoulder.      Rolando Mosqueda MD  11/16/21  14:21 EST      Dictated Utilizing Dragon Dictation.

## 2021-11-18 ENCOUNTER — APPOINTMENT (OUTPATIENT)
Dept: MRI IMAGING | Facility: HOSPITAL | Age: 56
End: 2021-11-18

## 2021-11-19 ENCOUNTER — HOSPITAL ENCOUNTER (OUTPATIENT)
Dept: MRI IMAGING | Facility: HOSPITAL | Age: 56
Discharge: HOME | End: 2021-11-19
Admitting: ORTHOPAEDIC SURGERY

## 2021-11-19 DIAGNOSIS — M19.011 ARTHRITIS OF RIGHT ACROMIOCLAVICULAR JOINT: ICD-10-CM

## 2021-11-19 DIAGNOSIS — M25.511 RIGHT SHOULDER PAIN, UNSPECIFIED CHRONICITY: ICD-10-CM

## 2021-11-19 DIAGNOSIS — M17.12 PRIMARY OSTEOARTHRITIS OF LEFT KNEE: ICD-10-CM

## 2021-11-19 PROCEDURE — 73221 MRI JOINT UPR EXTREM W/O DYE: CPT

## 2021-11-22 ENCOUNTER — TELEPHONE (OUTPATIENT)
Dept: ORTHOPEDIC SURGERY | Facility: CLINIC | Age: 56
End: 2021-11-22

## 2021-11-22 NOTE — TELEPHONE ENCOUNTER
Caller: PATIENT      Relationship to patient: SELF      Best call back number: [phone]        Type of visit: FOLLOW UP MRI OF SHOULDER , AND PRP INJECTION     Requested date: WOULD LIKE TO MOVE APPT. TO THE WEEK OF 11/29/21     If rescheduling, when is the original appointment: 12/21/21     Additional notes:PT. HAS APPT. ON 12/21/21- 2 APPOINTMENTS.   STATES THAT DR. HURT TOLD HIM TO FOLLOW UP IN 2 WEEKS WHEN HE WAS SEEN ON 11/16/21.   HE IS ASKING FOR APPTS. TO BE MOVED TO THE WEEK OF 11/29/21  PLEASE CALL TO ADVISE.

## 2021-11-30 NOTE — TELEPHONE ENCOUNTER
CALLED PT TO LET HIM KNOW THAT WE DO NOT CURRENTLY HAVE THE AVAILABILITY TO GET HIM IN SOONER FOR BOTH APPOINTMENTS AT THE SAME TIME.  I LEFT MESSAGE AND ADV PT TO CALL BACK IF HE HAS ANY QUESTIONS BUT AT THIS TIME WE NEED TO KEEP HIM ON THE 21ST.

## 2021-12-01 ENCOUNTER — TELEPHONE (OUTPATIENT)
Dept: ORTHOPEDIC SURGERY | Facility: CLINIC | Age: 56
End: 2021-12-01

## 2021-12-01 NOTE — TELEPHONE ENCOUNTER
Dr. Mosqueda,    The patient's wife says you are wanting to see the patient sooner than scheduled follow up.  I do not see in the note of a follow up except to follow up after MRI.  Do you need to see the patient sooner?    ThanksNikita

## 2021-12-01 NOTE — TELEPHONE ENCOUNTER
Caller: PATIENT 'S WIFE    Relationship to patient: WIFE-    Best call back number:[phone]    Chief complaint: RIGHT SHOULDER. LEFT KNEE     Type of visit: FOLLOW U     Requested date: BEFORE 12/15/21     If rescheduling, when is the original appointment:  12/21/21     Additional notes: PT'S WIFE CALLING. STATES THAT HE HAS APPT. ON 12/21/21 FOR FOLLOW UP SHOULDER AND KNEE- IS SUPPOSED TO GET PRP INJECTION.   WIFE STATES THAT DR. ULLOA WANTED TO SEE PT. BEFORE THE 21ST.   ASKING IF PT. CAN BE MOVED UP TO A SOONER APPT.   PLEASE CALL TO ADVISE.

## 2021-12-02 NOTE — TELEPHONE ENCOUNTER
Okay to work the patient in next week and we can do his PRP at the same time of we have all the equipment/supplies in.  Let me know if that will not work.    Thank you    MEAGHAN

## 2021-12-03 NOTE — TELEPHONE ENCOUNTER
ABY Ibarra    He is rescheduled for next Thursday at 0930, we have a PRP box ready to go.    Thanks,    Nikita

## 2021-12-09 ENCOUNTER — OFFICE VISIT (OUTPATIENT)
Dept: ORTHOPEDIC SURGERY | Facility: CLINIC | Age: 56
End: 2021-12-09

## 2021-12-09 VITALS
DIASTOLIC BLOOD PRESSURE: 90 MMHG | SYSTOLIC BLOOD PRESSURE: 150 MMHG | WEIGHT: 229.28 LBS | BODY MASS INDEX: 32.82 KG/M2 | HEIGHT: 70 IN

## 2021-12-09 DIAGNOSIS — M25.511 RIGHT SHOULDER PAIN, UNSPECIFIED CHRONICITY: ICD-10-CM

## 2021-12-09 DIAGNOSIS — M19.011 ARTHRITIS OF RIGHT ACROMIOCLAVICULAR JOINT: ICD-10-CM

## 2021-12-09 DIAGNOSIS — Z98.890 S/P COMPLETE REPAIR OF ROTATOR CUFF: ICD-10-CM

## 2021-12-09 DIAGNOSIS — M17.12 PRIMARY OSTEOARTHRITIS OF LEFT KNEE: Primary | ICD-10-CM

## 2021-12-09 DIAGNOSIS — M75.111 INCOMPLETE TEAR OF RIGHT ROTATOR CUFF, UNSPECIFIED WHETHER TRAUMATIC: ICD-10-CM

## 2021-12-09 PROCEDURE — 0232T NJX PLATELET PLASMA: CPT | Performed by: ORTHOPAEDIC SURGERY

## 2021-12-09 PROCEDURE — 20605 DRAIN/INJ JOINT/BURSA W/O US: CPT | Performed by: ORTHOPAEDIC SURGERY

## 2021-12-09 PROCEDURE — 99214 OFFICE O/P EST MOD 30 MIN: CPT | Performed by: ORTHOPAEDIC SURGERY

## 2021-12-09 RX ORDER — LIDOCAINE HYDROCHLORIDE 10 MG/ML
3 INJECTION, SOLUTION EPIDURAL; INFILTRATION; INTRACAUDAL; PERINEURAL
Status: COMPLETED | OUTPATIENT
Start: 2021-12-09 | End: 2021-12-09

## 2021-12-09 RX ORDER — DOXYCYCLINE HYCLATE 100 MG
100 TABLET ORAL 2 TIMES DAILY
COMMUNITY
Start: 2021-11-30

## 2021-12-09 RX ORDER — TRIAMCINOLONE ACETONIDE 40 MG/ML
20 INJECTION, SUSPENSION INTRA-ARTICULAR; INTRAMUSCULAR
Status: COMPLETED | OUTPATIENT
Start: 2021-12-09 | End: 2021-12-09

## 2021-12-09 RX ORDER — DEXTROMETHORPHAN HYDROBROMIDE AND PROMETHAZINE HYDROCHLORIDE 15; 6.25 MG/5ML; MG/5ML
SYRUP ORAL
COMMUNITY
Start: 2021-11-30

## 2021-12-09 RX ORDER — LIDOCAINE HYDROCHLORIDE 10 MG/ML
1 INJECTION, SOLUTION EPIDURAL; INFILTRATION; INTRACAUDAL; PERINEURAL
Status: COMPLETED | OUTPATIENT
Start: 2021-12-09 | End: 2021-12-09

## 2021-12-09 RX ADMIN — LIDOCAINE HYDROCHLORIDE 3 ML: 10 INJECTION, SOLUTION EPIDURAL; INFILTRATION; INTRACAUDAL; PERINEURAL at 09:48

## 2021-12-09 RX ADMIN — TRIAMCINOLONE ACETONIDE 20 MG: 40 INJECTION, SUSPENSION INTRA-ARTICULAR; INTRAMUSCULAR at 09:48

## 2021-12-09 RX ADMIN — LIDOCAINE HYDROCHLORIDE 1 ML: 10 INJECTION, SOLUTION EPIDURAL; INFILTRATION; INTRACAUDAL; PERINEURAL at 09:48

## 2021-12-09 NOTE — PROGRESS NOTES
Procedure   Left knee PRP injection  Date/Time: 12/9/2021 9:48 AM  Consent given by: patient  Site marked: site marked  Timeout: Immediately prior to procedure a time out was called to verify the correct patient, procedure, equipment, support staff and site/side marked as required   Supporting Documentation  Indications: pain   Procedure Details  Location: knee - L knee  Preparation: Patient was prepped and draped in the usual sterile fashion  Needle size: 20 G  Approach: anterolateral  Medications administered: 3 mL lidocaine PF 1% 1 % (left knee prp injection. 15mL blood drawn from left arm, spun down and 5mL PRP injected back into Left knee)  Patient tolerance: patient tolerated the procedure well with no immediate complications

## 2021-12-09 NOTE — PROGRESS NOTES
Procedure   Medium Joint Arthrocentesis: R acromioclavicular  Date/Time: 12/9/2021 9:48 AM  Consent given by: patient  Site marked: site marked  Timeout: Immediately prior to procedure a time out was called to verify the correct patient, procedure, equipment, support staff and site/side marked as required   Supporting Documentation  Indications: pain   Procedure Details  Location: shoulder - R acromioclavicular  Preparation: Patient was prepped and draped in the usual sterile fashion  Needle size: 25 G  Approach: superior  Medications administered: 1 mL lidocaine PF 1% 1 %; 20 mg triamcinolone acetonide 40 MG/ML  Patient tolerance: patient tolerated the procedure well with no immediate complications

## 2021-12-09 NOTE — PROGRESS NOTES
"    [facility] [place] [facility] [place]        Subjective     CC: Follow-up (3 week recheck - PRP injection Left knee -  Primary osteoarthritis of left knee     &    MRI (11/19/21) recheck - Arthritis of right acromioclavicular joint )      HPI    Patricio Iverson is a 56 y.o. male.  Patient returns for follow-up of the MRI of his right shoulder.  He is also here for left knee PRP today.    Overall, patient's symptoms are worsening with right shoulder.    ROS:    Constiutional:Pt denies fever, chills, nausea, or vomiting.  MSK:as above        Objective      Past Medical History  Past Medical History:   Diagnosis Date   • Abdominal hernia    • Arthritis    • GERD (gastroesophageal reflux disease)    • Headache    • [place] (hard of hearing)    • Hyperlipidemia    • Hypertriglyceridemia    • Migraine    • Seasonal allergies    • Sleep apnea with use of continuous positive airway pressure (CPAP)     compliant with machine    • SOBOE (shortness of breath on exertion)    • Wears glasses    • Wears partial dentures     \"dental implant\"  tops and most bottom - crowns and implants          Physical Exam  /90   Ht 177.8 cm (70\")   Wt 104 kg (229 lb 4.5 oz)   BMI 32.90 kg/m²     Body mass index is 32.9 kg/m².    Patient is well nourished and well developed.        Ortho Exam  Musculoskeletal   Upper Extremity   Right Shoulder       Strength and Tone:    Supraspinatus -5-5    External Rotators-5/5    Infraspinatus - 5/5    Subscapularis - 5/5    Deltoid - 5/5     Range of Motion      Right Shoulder:    Internal Rotation: ROM - L4    External Rotation: AROM - 70 degrees    Elevation through flexion: AROM - 140 degrees     AC joint: Moderately to severely tender to palpation    AC joint: Positive crossover      Imaging/Labs/EMG Reviewed:  Imaging Results (Last 24 Hours)     ** No results found for the last 24 hours. **      MRI Shoulder Right Without Contrast  Narrative: MRI Shoulder RT WO    INDICATION:    56-year-old " male with right shoulder pain and acromioclavicular joint pain. Evaluate for a rotator cuff tear.    TECHNIQUE:   MRI of the  right shoulder without IV contrast.    COMPARISON:   MRI of the right shoulder without contrast dated 10/27/2019.    FINDINGS:   Rotator cuff: There is evidence of previous rotator cuff repair, with screw tracts seen in the lateral aspect of the humeral head. This was also present on the prior exam. There are signal changes are seen diffusely in the supraspinatus tendon,  compatible with tendinopathy/tendinitis and probable partial interstitial tearing but no evidence of complete disruption. No tendon retraction or significant atrophic changes in the muscle. There is a focal area of partial thickness articular surface  tearing in the anterior aspect of the distal infraspinatus tendon near its junction with the supraspinatus tendon, well seen on series 4 image 19, and measuring approximately 3 to 4 mm in transverse diameter and 3 to 4 mm in anterior to posterior  diameter. Diffuse tendinopathy is also noted in the infraspinatus tendon.    The subscapularis muscle and tendon appear intact, and the teres minor muscle and tendon appear intact.    Glenoid labrum and biceps tendon: Evaluation of the labrum is somewhat limited by lack of fluid in the joint space. Mild signal abnormality and contour irregularity at the base of the superior labrum suggests fraying or undersurface tearing. Labrum  otherwise appears grossly intact.    The long head of the biceps tendon is seen in the bicipital groove, and is intact. There is mild intra-articular long head biceps tendinopathy.    AC joint: There is no anterior or lateral downsloping of the acromion. There are severe acromioclavicular joint degenerative changes, which are similar to the prior exam. There is capsular hypertrophy and spurring, as well as subchondral cystic changes  and edema in the distal clavicle and to a lesser extent in the acromion.  Inferior capsular hypertrophy and spurring impinges on the subacromial space and effaces the underlying supraspinatus muscle. There is a small amount of subacromial fluid compatible  with the mild bursitis.    There is a small fluid collection in the soft tissues just superior to the acromioclavicular joint, likely reflecting a small synovial cyst, and measuring 5 to 6 mm in diameter.    The coracoacromial and coracoclavicular ligaments appear intact.    Articular Cartilage: Limited evaluation. The articular cartilage appears grossly intact.    Bone: The humeral head is seated in the glenoid fossa. There are anchor tracks in the bony glenoid. Marrow signal intensity in the proximal humerus and bony glenoid is otherwise unremarkable. There is no significant joint effusion.    The region of the suprascapular notch is normal in appearance. The surrounding soft tissues, as imaged are unremarkable.  Impression: 1. Severe acromioclavicular joint degenerative changes, with capsular hypertrophy, spurring, and subchondral cyst formation and edema in the distal clavicle. There is also a small fluid collection above the joint that likely reflects a small synovial  cyst. Inferior capsular hypertrophy and spurring impinges on the subacromial space and effaces the underlying supraspinatus muscle.  2. Mild subacromial bursal inflammation.  3. Evidence of previous rotator cuff repair. Tendinopathy and partial interstitial tearing in the supraspinatus tendon, and small partial thickness articular surface tearing in the anterior portion of the infraspinatus tendon. No tendon disruption or  atrophic changes in the muscles.  4. Limited evaluation of the labrum which appears grossly intact.  5. Mild intra-articular long head biceps tendinopathy.    Signer Name: Lexii Reeves MD   Signed: 11/19/2021 7:50 PM   Workstation Name: [facility]    [facility]    We reviewed images and report of the MRI listed above.   Patient has severe edema at the AC joint but no concerning lesions.  There is partial-thickness tearing of the rotator cuff involving the supra and infra but no full-thickness injuries.    Assessment    Assessment:  1. Primary osteoarthritis of left knee    2. Right shoulder pain, unspecified chronicity    3. Arthritis of right acromioclavicular joint    4. Incomplete tear of right rotator cuff, unspecified whether traumatic    5. S/P complete repair of rotator cuff        Plan:  Recommend over the counter anti-inflammatories for pain and/or swelling  Osteoarthritis left knee--PRP will be given today.  Follow-up in 3 months.  Chronic right shoulder pain in the face of partial-thickness rotator cuff tear status post rotator cuff repair years ago.  Patient continues to demonstrate no full-thickness injuries.  Patient also has severe AC joint arthritis.  Plan will be for diagnostic and therapeutic injection into the AC joint today.  Follow-up in 3 months.  Patient has requested a referral to the [facility] for rheumatologic evaluation given a family history.  This will be facilitated today.    Procedure Note:    I discussed with the patient the potential benefits of performing a therapeutic injections as well as potential risks including but not limited to infection, swelling, pain, bleeding, bruising, nerve/vessel damage, skin color changes, transient elevation in blood glucose levels, and fat atrophy. After informed consent and after the areas were prepped with chlorhexadine soap, ethyl chloride was used to numb the skin. Via the superior approach, a combination of 1mL of 1% lidocaine and 20mg of Kenalog were injected into the AC joint of the right shoulder. The patient tolerated the procedure well. There were no complications. A sterile dressing was placed over the injection sites.      Procedure Note:    I discussed with the patient the potential benefits of performing a therapeutic injections  as well as potential risks including but not limited to infection, swelling, pain, bleeding, bruising, nerve/vessel damage, skin color changes, transient elevation in blood glucose levels, and fat atrophy. After informed consent was obtained, a sterile blood sample was taken from the patient and placed into the centrifuge for 15 minutes.  After preparation of the PRP, the areas were prepped with chlorhexadine soap and ethyl chloride was used to numb the skin. Via the superiorlateral approach, 3mL of 1% lidocaine was injected into the region followed by aspiration of a small amount of articular fluid to verify intraarticular placement of the needle.  We then injected 5 mL of PRP into the left knee joint. The patient tolerated the procedure well. There were no complications. A sterile dressing was placed over the injection sites.        Rolando Mosqueda MD  12/09/21  09:55 EST      Dictated Utilizing Dragon Dictation.

## 2022-03-15 ENCOUNTER — OFFICE VISIT (OUTPATIENT)
Dept: ORTHOPEDIC SURGERY | Facility: CLINIC | Age: 57
End: 2022-03-15

## 2022-03-15 VITALS
DIASTOLIC BLOOD PRESSURE: 92 MMHG | WEIGHT: 235 LBS | HEIGHT: 70 IN | BODY MASS INDEX: 33.64 KG/M2 | SYSTOLIC BLOOD PRESSURE: 142 MMHG

## 2022-03-15 DIAGNOSIS — M17.12 PRIMARY OSTEOARTHRITIS OF LEFT KNEE: Primary | ICD-10-CM

## 2022-03-15 DIAGNOSIS — M19.011 ARTHRITIS OF RIGHT ACROMIOCLAVICULAR JOINT: ICD-10-CM

## 2022-03-15 PROCEDURE — 20605 DRAIN/INJ JOINT/BURSA W/O US: CPT | Performed by: ORTHOPAEDIC SURGERY

## 2022-03-15 PROCEDURE — 99213 OFFICE O/P EST LOW 20 MIN: CPT | Performed by: ORTHOPAEDIC SURGERY

## 2022-03-15 RX ORDER — LIDOCAINE HYDROCHLORIDE 10 MG/ML
3 INJECTION, SOLUTION EPIDURAL; INFILTRATION; INTRACAUDAL; PERINEURAL
Status: COMPLETED | OUTPATIENT
Start: 2022-03-15 | End: 2022-03-15

## 2022-03-15 RX ORDER — TRIAMCINOLONE ACETONIDE 40 MG/ML
40 INJECTION, SUSPENSION INTRA-ARTICULAR; INTRAMUSCULAR
Status: COMPLETED | OUTPATIENT
Start: 2022-03-15 | End: 2022-03-15

## 2022-03-15 RX ADMIN — LIDOCAINE HYDROCHLORIDE 3 ML: 10 INJECTION, SOLUTION EPIDURAL; INFILTRATION; INTRACAUDAL; PERINEURAL at 08:55

## 2022-03-15 RX ADMIN — TRIAMCINOLONE ACETONIDE 40 MG: 40 INJECTION, SUSPENSION INTRA-ARTICULAR; INTRAMUSCULAR at 08:55

## 2022-03-15 NOTE — PROGRESS NOTES
"    [facility] – [place] [facility] Note        Subjective     CC: Pain of the Left Knee ( Month f/u Primary osteoarthritis of left knee //)      HPI    Patricio Iverson is a 56 y.o. male.  Patient is here today for follow-up of his left knee arthritis and right AC joint arthritis.  Has had a rotator cuff repair in the past and has a partial-thickness cuff tear on the right side.  After his AC joint junction on the right, his pain is much better but is started to come back over the last 2 to 3 weeks.  The PRP injection has helped him.    Overall, patient's symptoms are as above.    ROS:    Constiutional:Pt denies fever, chills, nausea, or vomiting.  MSK:as above        Objective      Past Medical History  Past Medical History:   Diagnosis Date   • Abdominal hernia    • Arthritis    • GERD (gastroesophageal reflux disease)    • Headache    • [place] (hard of hearing)    • Hyperlipidemia    • Hypertriglyceridemia    • Migraine    • Seasonal allergies    • Sleep apnea with use of continuous positive airway pressure (CPAP)     compliant with machine    • SOBOE (shortness of breath on exertion)    • Wears glasses    • Wears partial dentures     \"dental implant\"  tops and most bottom - crowns and implants          Physical Exam  /92   Ht 177.8 cm (70\")   Wt 107 kg (235 lb)   BMI 33.72 kg/m²     Body mass index is 33.72 kg/m².    Patient is well nourished and well developed.        Ortho Exam      Musculoskeletal:  Global Assessment:  Overall assessment of Lower Extremity Muscle Strength and Tone:  Left quadriceps--5/5   Left hamstrings--5/5       Left tibialis anterior--5/5  Left gastroc-soleus--5/5  Left EHL --5/5    Lower Extremity:    Inspection and Palpation:  Left knee:  Tenderness:  Over the medial joint line and moderate severity  Effusion:  1+  Crepitus:  Positive  Pulses:  2+  Ecchymosis:  None  Warmth:  None     ROM:  Left:  Extension: 5     Flexion:120    Instability:    Left:    Lachman Test:  Negative "   Varus stress test negative  Valgus stress test negative    Deformities/Malalignments/Discrepancies:    Left:  Genu Varum     Functional Testing:  Ritika's test:  Negative  Patella grind test:  Positive  Q-angle:  Normal      Musculoskeletal   Upper Extremity   Right Shoulder       Strength and Tone:    Supraspinatus -5 out of 5 without pain    External Rotators-5/5    Infraspinatus - 5/5    Subscapularis - 5/5    Deltoid - 5/5     Range of Motion      Right Shoulder:    Internal Rotation: ROM - L4    External Rotation: AROM - 70 degrees    Elevation through flexion: AROM - 140 degrees     AC joint:  non tender to palpation    AC joint: Positive crossover        Imaging/Labs/EMG Reviewed:  Imaging Results (Last 24 Hours)     ** No results found for the last 24 hours. **            Assessment    Assessment:  1. Primary osteoarthritis of left knee    2. Arthritis of right acromioclavicular joint        Plan:  1. Recommend over the counter anti-inflammatories for pain and/or swelling  2. Osteoarthritis left knee--grade 3 medial and patellofemoral.  Observe for now.  He is doing better after PRP.  3. Right partial-thickness rotator cuff tear after rotator cuff repair in the past.  He also has severe AC joint arthritis.  AC joint pain has returned.  We will reinject him today.  Follow-up in 4 months.    Procedure Note:    I discussed with the patient the potential benefits of performing a therapeutic injections as well as potential risks including but not limited to infection, swelling, pain, bleeding, bruising, nerve/vessel damage, skin color changes, transient elevation in blood glucose levels, and fat atrophy. After informed consent and after the areas were prepped with chlorhexadine soap, ethyl chloride was used to numb the skin. Via the superior approach, a combination of 1mL of 1% lidocaine and 20mg of Kenalog were injected into the AC joint of the right shoulder. The patient tolerated the procedure well. There  were no complications. A sterile dressing was placed over the injection sites.        Rolando Mosqueda MD  03/15/22  08:53 EDT      Dictated Utilizing Dragon Dictation.

## 2022-03-15 NOTE — PROGRESS NOTES
Procedure   Medium Joint Arthrocentesis: R acromioclavicular  Date/Time: 3/15/2022 8:55 AM  Consent given by: patient  Site marked: site marked  Timeout: Immediately prior to procedure a time out was called to verify the correct patient, procedure, equipment, support staff and site/side marked as required   Supporting Documentation  Indications: pain   Procedure Details  Location: shoulder - R acromioclavicular  Preparation: Patient was prepped and draped in the usual sterile fashion  Needle size: 25 G  Approach: anterolateral  Medications administered: 40 mg triamcinolone acetonide 40 MG/ML; 3 mL lidocaine PF 1% 1 %  Patient tolerance: patient tolerated the procedure well with no immediate complications

## 2022-04-12 NOTE — PAT
Patient attended joint replacement class today during PAT visit.      Patient instructed to drink 20 ounces (or until full) of Gatorade or 20 ounces of G2 (if diabetic) and complete 1 hour before arrival time for procedure (NO RED Gatorade or G2)    Patient verbalized understanding.      Verified with patient that they received a prescription for Bactroban and Chlorhexidine shower from the office.  Reinforced with them to fill the prescription if they haven't already.  Verbal and written instructions given regarding proper use of the Bactroban and Chlorhexidine to patient and/or famlily during PAT visit. Patient/family also instructed to complete checklist and return it to Pre-op on the day of surgery.  Patient and/or family verbalized understanding.        Per Anesthesia Request, patient instructed not to take their ACE/ARB medications on the AM of surgery.   [de-identified] : right sided pulsating jugular  [de-identified] : bilateral 2+ pitting edema

## 2022-07-14 ENCOUNTER — OFFICE VISIT (OUTPATIENT)
Dept: ORTHOPEDIC SURGERY | Facility: CLINIC | Age: 57
End: 2022-07-14

## 2022-07-14 VITALS
HEIGHT: 70 IN | BODY MASS INDEX: 32.78 KG/M2 | WEIGHT: 229 LBS | SYSTOLIC BLOOD PRESSURE: 142 MMHG | DIASTOLIC BLOOD PRESSURE: 80 MMHG | HEART RATE: 82 BPM

## 2022-07-14 DIAGNOSIS — M25.511 RIGHT SHOULDER PAIN, UNSPECIFIED CHRONICITY: ICD-10-CM

## 2022-07-14 DIAGNOSIS — M17.12 PRIMARY OSTEOARTHRITIS OF LEFT KNEE: ICD-10-CM

## 2022-07-14 DIAGNOSIS — M75.111 INCOMPLETE TEAR OF RIGHT ROTATOR CUFF, UNSPECIFIED WHETHER TRAUMATIC: ICD-10-CM

## 2022-07-14 DIAGNOSIS — M19.011 ARTHRITIS OF RIGHT ACROMIOCLAVICULAR JOINT: Primary | ICD-10-CM

## 2022-07-14 PROBLEM — M19.90 OSTEOARTHRITIS: Status: ACTIVE | Noted: 2022-07-14

## 2022-07-14 PROCEDURE — 20605 DRAIN/INJ JOINT/BURSA W/O US: CPT | Performed by: ORTHOPAEDIC SURGERY

## 2022-07-14 PROCEDURE — 20610 DRAIN/INJ JOINT/BURSA W/O US: CPT | Performed by: ORTHOPAEDIC SURGERY

## 2022-07-14 PROCEDURE — 99213 OFFICE O/P EST LOW 20 MIN: CPT | Performed by: ORTHOPAEDIC SURGERY

## 2022-07-14 RX ORDER — LIDOCAINE HYDROCHLORIDE 10 MG/ML
1 INJECTION, SOLUTION EPIDURAL; INFILTRATION; INTRACAUDAL; PERINEURAL
Status: COMPLETED | OUTPATIENT
Start: 2022-07-14 | End: 2022-07-14

## 2022-07-14 RX ORDER — TRIAMCINOLONE ACETONIDE 40 MG/ML
40 INJECTION, SUSPENSION INTRA-ARTICULAR; INTRAMUSCULAR
Status: COMPLETED | OUTPATIENT
Start: 2022-07-14 | End: 2022-07-14

## 2022-07-14 RX ORDER — GARLIC 180 MG
TABLET, DELAYED RELEASE (ENTERIC COATED) ORAL DAILY
COMMUNITY

## 2022-07-14 RX ORDER — LIDOCAINE HYDROCHLORIDE 10 MG/ML
3 INJECTION, SOLUTION EPIDURAL; INFILTRATION; INTRACAUDAL; PERINEURAL
Status: COMPLETED | OUTPATIENT
Start: 2022-07-14 | End: 2022-07-14

## 2022-07-14 RX ORDER — TRIAMCINOLONE ACETONIDE 40 MG/ML
20 INJECTION, SUSPENSION INTRA-ARTICULAR; INTRAMUSCULAR
Status: COMPLETED | OUTPATIENT
Start: 2022-07-14 | End: 2022-07-14

## 2022-07-14 RX ADMIN — TRIAMCINOLONE ACETONIDE 20 MG: 40 INJECTION, SUSPENSION INTRA-ARTICULAR; INTRAMUSCULAR at 09:29

## 2022-07-14 RX ADMIN — LIDOCAINE HYDROCHLORIDE 1 ML: 10 INJECTION, SOLUTION EPIDURAL; INFILTRATION; INTRACAUDAL; PERINEURAL at 09:29

## 2022-07-14 RX ADMIN — TRIAMCINOLONE ACETONIDE 40 MG: 40 INJECTION, SUSPENSION INTRA-ARTICULAR; INTRAMUSCULAR at 09:30

## 2022-07-14 RX ADMIN — LIDOCAINE HYDROCHLORIDE 3 ML: 10 INJECTION, SOLUTION EPIDURAL; INFILTRATION; INTRACAUDAL; PERINEURAL at 09:30

## 2022-07-14 NOTE — PROGRESS NOTES
"    [facility] – [place] [facility] Note        Subjective     CC: Follow-up and Pain of the Left Knee (4 month follow up ) and Follow-up of the Right Shoulder (Ac joint athritis, last injection 3/15/22)      HPI    Patricio Iverson is a 56 y.o. male.  Patient returns the office today for follow-up of his left knee arthritis and right partial-thickness rotator cuff tear and AC joint arthritis.  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  He had PRP in the left knee about a year ago and that helped him for 3 to 4 months only.    Overall, patient's symptoms are as above.    ROS:    Constiutional:Pt denies fever, chills, nausea, or vomiting.  MSK:as above        Objective      Past Medical History  Past Medical History:   Diagnosis Date   • Abdominal hernia    • Arthritis    • GERD (gastroesophageal reflux disease)    • Headache    • [place] (hard of hearing)    • Hyperlipidemia    • Hypertriglyceridemia    • Migraine    • Seasonal allergies    • Sleep apnea with use of continuous positive airway pressure (CPAP)     compliant with machine    • SOBOE (shortness of breath on exertion)    • Wears glasses    • Wears partial dentures     \"dental implant\"  tops and most bottom - crowns and implants          Physical Exam  /80   Pulse 82   Ht 177.8 cm (70\")   Wt 104 kg (229 lb)   BMI 32.86 kg/m²     Body mass index is 32.86 kg/m².    Patient is well nourished and well developed.        Ortho Exam  Musculoskeletal   Upper Extremity   Right Shoulder       Strength and Tone:    Supraspinatus -4-5 with pain    External Rotators-5/5    Infraspinatus - 5/5    Subscapularis - 5/5    Deltoid - 5/5     Range of Motion      Right Shoulder:    Internal Rotation: ROM - L4    External Rotation: AROM - 70 degrees    Elevation through flexion: AROM - 140 degrees     AC joint:  " tender to palpation    AC joint: Positive crossover        Musculoskeletal:  Global Assessment:  Overall assessment of Lower Extremity Muscle Strength and Tone:  Left quadriceps--5/5   Left hamstrings--5/5       Left tibialis anterior--5/5  Left gastroc-soleus--5/5  Left EHL --5/5    Lower Extremity:    Inspection and Palpation:  Left knee:  Tenderness:  Over the medial joint line and moderate severity  Effusion:  1+  Crepitus:  Positive  Pulses:  2+  Ecchymosis:  None  Warmth:  None     ROM:  Left:  Extension: 5     Flexion:120    Instability:    Left:    Lachman Test:  Negative   Varus stress test negative  Valgus stress test negative    Deformities/Malalignments/Discrepancies:    Left:  Genu Varum     Functional Testing:  Ritika's test:  Negative  Patella grind test:  Positive  Q-angle:  normal        Imaging/Labs/EMG Reviewed:  Imaging Results (Last 24 Hours)     ** No results found for the last 24 hours. **            Assessment    Assessment:  1. Arthritis of right acromioclavicular joint    2. Right shoulder pain, unspecified chronicity    3. Incomplete tear of right rotator cuff, unspecified whether traumatic        Plan:  1. Recommend over the counter anti-inflammatories for pain and/or swelling  2. Chronic right shoulder pain with history of rotator cuff repair and new onset partial-thickness rotator cuff tear that seems to be near full-thickness in several locations in the face of severe AC joint arthritis--repeat AC joint junction will be given today.  We talked him briefly about the possibility of repairing the rotator cuff but out on that he can stand to be down 3 to 4 months.  3. Left knee arthritis--not worth repeating PRP given only short-lived benefit.  Steroid injection will be given today.  When he returns in 4 months, knee x-rays will be obtained.    Procedure Note:    I discussed with the patient the potential benefits of performing a therapeutic injections as well as potential risks  including but not limited to infection, swelling, pain, bleeding, bruising, nerve/vessel damage, skin color changes, transient elevation in blood glucose levels, and fat atrophy. After informed consent and after the areas were prepped with chlorhexadine soap, ethyl chloride was used to numb the skin. Via the superior approach, a combination of 1mL of 1% lidocaine and 20mg of Kenalog were injected into the AC joint of the right shoulder. The patient tolerated the procedure well. There were no complications. A sterile dressing was placed over the injection sites.    Procedure Note:    I discussed with the patient the potential benefits of performing a therapeutic injections as well as potential risks including but not limited to infection, swelling, pain, bleeding, bruising, nerve/vessel damage, skin color changes, transient elevation in blood glucose levels, and fat atrophy. After informed consent and after the areas were prepped with chlorhexadine soap, ethyl chloride was used to numb the skin. Via the anterolateral approach, 3mL of 1% lidocaine followed by 40mg of Kenalog were each injected into the left knee joint. The patient tolerated the procedure well. There were no complications. A sterile dressing was placed over the injection sites.          Rolando Mosqueda MD  07/14/22  09:26 EDT      Dictated Utilizing Dragon Dictation.   No

## 2022-07-14 NOTE — PROGRESS NOTES
Procedure   Large Joint Arthrocentesis: L knee  Date/Time: 7/14/2022 9:28 AM  Consent given by: patient  Site marked: site marked  Timeout: Immediately prior to procedure a time out was called to verify the correct patient, procedure, equipment, support staff and site/side marked as required   Supporting Documentation  Indications: pain   Procedure Details  Location: knee - L knee  Preparation: Patient was prepped and draped in the usual sterile fashion  Needle size: 22 G  Approach: anterolateral  Medications administered: 4 mL lidocaine PF 1% 1 %; 40 mg triamcinolone acetonide 40 MG/ML  Patient tolerance: patient tolerated the procedure well with no immediate complications

## 2022-07-14 NOTE — PROGRESS NOTES
Procedure   Medium Joint Arthrocentesis: R acromioclavicular  Date/Time: 7/14/2022 9:29 AM  Consent given by: patient  Site marked: site marked  Timeout: Immediately prior to procedure a time out was called to verify the correct patient, procedure, equipment, support staff and site/side marked as required   Supporting Documentation  Indications: pain   Procedure Details  Location: shoulder - R acromioclavicular  Preparation: Patient was prepped and draped in the usual sterile fashion  Needle size: 25 G  Approach: anteromedial  Medications administered: 1 mL lidocaine 1 %; 40 mg triamcinolone acetonide 40 MG/ML  Patient tolerance: patient tolerated the procedure well with no immediate complications

## 2022-07-14 NOTE — PROGRESS NOTES
Procedure   Medium Joint Arthrocentesis: R acromioclavicular  Date/Time: 7/14/2022 9:29 AM  Consent given by: patient  Site marked: site marked  Timeout: Immediately prior to procedure a time out was called to verify the correct patient, procedure, equipment, support staff and site/side marked as required   Supporting Documentation  Indications: pain   Procedure Details  Location: shoulder - R acromioclavicular  Preparation: Patient was prepped and draped in the usual sterile fashion  Needle size: 25 G  Approach: superior  Medications administered: 1 mL lidocaine PF 1% 1 %; 20 mg triamcinolone acetonide 40 MG/ML  Patient tolerance: patient tolerated the procedure well with no immediate complications    Large Joint Arthrocentesis: L knee  Date/Time: 7/14/2022 9:30 AM  Consent given by: patient  Site marked: site marked  Timeout: Immediately prior to procedure a time out was called to verify the correct patient, procedure, equipment, support staff and site/side marked as required   Supporting Documentation  Indications: pain   Procedure Details  Location: knee - L knee  Preparation: Patient was prepped and draped in the usual sterile fashion  Needle size: 25 G  Approach: anterolateral  Medications administered: 3 mL lidocaine PF 1% 1 %; 40 mg triamcinolone acetonide 40 MG/ML  Patient tolerance: patient tolerated the procedure well with no immediate complications

## 2023-01-10 ENCOUNTER — TELEPHONE (OUTPATIENT)
Dept: ORTHOPEDIC SURGERY | Facility: CLINIC | Age: 58
End: 2023-01-10

## 2023-01-10 NOTE — TELEPHONE ENCOUNTER
Caller: PATIENT    Relationship to patient: SELF     Best call back number: [phone]    Chief complaint: RIGHT SHOULDER AND LEFT KNEE PAIN    Type of visit: INJECTION     Requested date: NEXT WEEK IF POSSIBLE.     Additional notes: PATIENT WAS CALLING TO SCHEDULE AN INJECTION FOR HIS HIS RIGHT SHOULDER AND LEFT KNEE. DR. HURT NORMALLY GIVES HIM AN INJECTION IN BOTH AREAS AT THE SAME APPT. THANK YOU!

## 2023-01-17 ENCOUNTER — OFFICE VISIT (OUTPATIENT)
Dept: ORTHOPEDIC SURGERY | Facility: CLINIC | Age: 58
End: 2023-01-17
Payer: COMMERCIAL

## 2023-01-17 VITALS
SYSTOLIC BLOOD PRESSURE: 130 MMHG | WEIGHT: 229.28 LBS | HEIGHT: 70 IN | BODY MASS INDEX: 32.82 KG/M2 | DIASTOLIC BLOOD PRESSURE: 82 MMHG

## 2023-01-17 DIAGNOSIS — M17.12 PRIMARY OSTEOARTHRITIS OF LEFT KNEE: Primary | ICD-10-CM

## 2023-01-17 DIAGNOSIS — M19.011 ARTHRITIS OF RIGHT ACROMIOCLAVICULAR JOINT: ICD-10-CM

## 2023-01-17 PROCEDURE — 20605 DRAIN/INJ JOINT/BURSA W/O US: CPT | Performed by: ORTHOPAEDIC SURGERY

## 2023-01-17 PROCEDURE — 20610 DRAIN/INJ JOINT/BURSA W/O US: CPT | Performed by: ORTHOPAEDIC SURGERY

## 2023-01-17 PROCEDURE — 99213 OFFICE O/P EST LOW 20 MIN: CPT | Performed by: ORTHOPAEDIC SURGERY

## 2023-01-17 RX ORDER — TRIAMCINOLONE ACETONIDE 40 MG/ML
20 INJECTION, SUSPENSION INTRA-ARTICULAR; INTRAMUSCULAR
Status: COMPLETED | OUTPATIENT
Start: 2023-01-17 | End: 2023-01-17

## 2023-01-17 RX ORDER — FLUTICASONE FUROATE AND VILANTEROL TRIFENATATE 100; 25 UG/1; UG/1
1 POWDER RESPIRATORY (INHALATION) DAILY
COMMUNITY
Start: 2023-01-07

## 2023-01-17 RX ORDER — TRIAMCINOLONE ACETONIDE 40 MG/ML
40 INJECTION, SUSPENSION INTRA-ARTICULAR; INTRAMUSCULAR
Status: COMPLETED | OUTPATIENT
Start: 2023-01-17 | End: 2023-01-17

## 2023-01-17 RX ORDER — LIDOCAINE HYDROCHLORIDE 10 MG/ML
3 INJECTION, SOLUTION EPIDURAL; INFILTRATION; INTRACAUDAL; PERINEURAL
Status: COMPLETED | OUTPATIENT
Start: 2023-01-17 | End: 2023-01-17

## 2023-01-17 RX ORDER — LIDOCAINE HYDROCHLORIDE 10 MG/ML
1 INJECTION, SOLUTION EPIDURAL; INFILTRATION; INTRACAUDAL; PERINEURAL
Status: COMPLETED | OUTPATIENT
Start: 2023-01-17 | End: 2023-01-17

## 2023-01-17 RX ADMIN — LIDOCAINE HYDROCHLORIDE 1 ML: 10 INJECTION, SOLUTION EPIDURAL; INFILTRATION; INTRACAUDAL; PERINEURAL at 10:29

## 2023-01-17 RX ADMIN — TRIAMCINOLONE ACETONIDE 40 MG: 40 INJECTION, SUSPENSION INTRA-ARTICULAR; INTRAMUSCULAR at 10:28

## 2023-01-17 RX ADMIN — TRIAMCINOLONE ACETONIDE 20 MG: 40 INJECTION, SUSPENSION INTRA-ARTICULAR; INTRAMUSCULAR at 10:29

## 2023-01-17 RX ADMIN — LIDOCAINE HYDROCHLORIDE 3 ML: 10 INJECTION, SOLUTION EPIDURAL; INFILTRATION; INTRACAUDAL; PERINEURAL at 10:28

## 2023-01-17 NOTE — PROGRESS NOTES
Procedure   Medium Joint Arthrocentesis: R acromioclavicular  Date/Time: 1/17/2023 10:29 AM  Consent given by: patient  Site marked: site marked  Timeout: Immediately prior to procedure a time out was called to verify the correct patient, procedure, equipment, support staff and site/side marked as required   Supporting Documentation  Indications: pain   Procedure Details  Location: shoulder - R acromioclavicular  Preparation: Patient was prepped and draped in the usual sterile fashion  Needle size: 25 G  Approach: dorsal  Medications administered: 1 mL lidocaine PF 1% 1 %; 20 mg triamcinolone acetonide 40 MG/ML  Patient tolerance: patient tolerated the procedure well with no immediate complications

## 2023-01-17 NOTE — PROGRESS NOTES
Procedure   Large Joint Arthrocentesis: L knee  Date/Time: 1/17/2023 10:28 AM  Consent given by: patient  Site marked: site marked  Timeout: Immediately prior to procedure a time out was called to verify the correct patient, procedure, equipment, support staff and site/side marked as required   Supporting Documentation  Indications: pain   Procedure Details  Location: knee - L knee  Preparation: Patient was prepped and draped in the usual sterile fashion  Needle size: 23 G  Approach: anterolateral  Medications administered: 3 mL lidocaine PF 1% 1 %; 40 mg triamcinolone acetonide 40 MG/ML  Patient tolerance: patient tolerated the procedure well with no immediate complications

## 2023-01-17 NOTE — PROGRESS NOTES
"    [facility] [place] [facility] Clinic Note        Subjective     CC: Follow-up (6 month recheck- Arthritis of right acromioclavicular joint, Primary osteoarthritis of left knee )      HPI    Patricio Iverson is a 57 y.o. male.  Patient is here today for follow-up of his right shoulder AC joint arthritis and left knee arthritis.  Has been doing a lot of repair work on his vacation home in [facility].  Says he does okay unless he is carrying a lot of heavy items.    Overall, patient's symptoms are as above.    ROS:    Constiutional:Pt denies fever, chills, nausea, or vomiting.  MSK:as above        Objective      Past Medical History  Past Medical History:   Diagnosis Date   • Abdominal hernia    • Arthritis    • GERD (gastroesophageal reflux disease)    • Headache    • [place] (hard of hearing)    • Hyperlipidemia    • Hypertriglyceridemia    • Migraine    • Seasonal allergies    • Sleep apnea with use of continuous positive airway pressure (CPAP)     compliant with machine    • SOBOE (shortness of breath on exertion)    • Wears glasses    • Wears partial dentures     \"dental implant\"  tops and most bottom - crowns and implants      Social History     Socioeconomic History   • Marital status: Single   Tobacco Use   • Smoking status: Never   • Smokeless tobacco: Never   Substance and Sexual Activity   • Alcohol use: Yes     Alcohol/week: 14.0 standard drinks     Types: 7 Cans of beer, 7 Shots of liquor per week   • Drug use: No   • Sexual activity: Defer          Physical Exam  /82   Ht 177.8 cm (70\")   Wt 104 kg (229 lb 4.5 oz)   BMI 32.90 kg/m²     Body mass index is 32.9 kg/m².    Patient is well nourished and well developed.        Ortho Exam  Patient's right AC joint is tender to palpation with positive crossover.  He has full range of motion of the shoulder.    Left knee shows medial joint line tenderness and palpable medial osteophytes.    Imaging/Labs/EMG Reviewed:  Imaging Results (Last 24 Hours)     " Procedure Component Value Units Date/Time    XR Knee 4+ View Left [[id number]] Resulted: 01/17/23 1008     Updated: 01/17/23 1009    Narrative:      Knee X-Ray    Indication: Pain    Study:  Upright AP, Skiers, Lateral, and Sunrise views of Left knee(s)    Comparison: Left knee 6/15/2021    Findings:    Patient appears to have severe hypertrophic degenerative changes in the   medial compartment.    There are mild degenerative changes in the lateral compartment.    There are moderate changes in the patellofemoral compartment.    Patient has overall varus alignment.    Kellgren-Jose Carlos ndGndrndanddndend:nd nd2nd Impression:   Severe hypertrophic medial compartment and moderate patellofemoral   compartment degnerative changes of the knee               Assessment    Assessment:  1. Primary osteoarthritis of left knee    2. Arthritis of right acromioclavicular joint        Plan:  1. Recommend over the counter anti-inflammatories for pain and/or swelling  2. OA left knee--bone-on-bone medial compartment and moderate to severe patellofemoral compartment.  Plan will be for steroid injection today at his request.  We talked to him about being radiographically ready for arthroplasty.  3. Right AC joint arthritis--steroid injection will be given today.  Follow-up in 6 months or sooner if necessary.    Procedure Note:    I discussed with the patient the potential benefits of performing a therapeutic injections as well as potential risks including but not limited to infection, swelling, pain, bleeding, bruising, nerve/vessel damage, skin color changes, transient elevation in blood glucose levels, and fat atrophy. After informed consent and after the areas were prepped with chlorhexadine soap, ethyl chloride was used to numb the skin. Via the superior approach, a combination of 1mL of 1% lidocaine and 20mg of Kenalog were injected into the AC joint of the right shoulder. The patient tolerated the procedure well. There were no complications. A  sterile dressing was placed over the injection sites.      Procedure Note:    I discussed with the patient the potential benefits of performing a therapeutic injections as well as potential risks including but not limited to infection, swelling, pain, bleeding, bruising, nerve/vessel damage, skin color changes, transient elevation in blood glucose levels, and fat atrophy. After informed consent and after the areas were prepped with chlorhexadine soap, ethyl chloride was used to numb the skin. Via the anterolateral approach, 3mL of 1% lidocaine followed by 40mg of Kenalog were each injected into the left knee joint. The patient tolerated the procedure well. There were no complications. A sterile dressing was placed over the injection sites.          Rolando Mosqueda MD  01/17/23  13:55 EST      Dictated Utilizing Dragon Dictation.

## 2023-08-08 ENCOUNTER — TELEPHONE (OUTPATIENT)
Dept: ORTHOPEDIC SURGERY | Facility: CLINIC | Age: 58
End: 2023-08-08
Payer: COMMERCIAL

## 2023-08-08 NOTE — TELEPHONE ENCOUNTER
Caller: Patricio Iverson    Relationship to patient: Self    Best call back number: [phone]    Chief complaint: RIGHT HIP     Type of visit: FOLLOW UP     Requested date: 08/08/2023- THURSDAY     Additional notes:PATIENT STATES THAT THERE IS BRUISING WITH EXTREME PAIN, STARTED 10 DAYS AGO- FIRST AVAILABLE WITH  08/21/2023 PATIENT WILL BE OUT OF TOWN FOR 3 WEEKS

## 2023-08-09 ENCOUNTER — OFFICE VISIT (OUTPATIENT)
Dept: ORTHOPEDIC SURGERY | Facility: CLINIC | Age: 58
End: 2023-08-09
Payer: COMMERCIAL

## 2023-08-09 VITALS
BODY MASS INDEX: 31.5 KG/M2 | SYSTOLIC BLOOD PRESSURE: 130 MMHG | HEIGHT: 70 IN | WEIGHT: 220 LBS | DIASTOLIC BLOOD PRESSURE: 70 MMHG

## 2023-08-09 DIAGNOSIS — Z96.641 STATUS POST TOTAL REPLACEMENT OF RIGHT HIP: Primary | ICD-10-CM

## 2023-08-09 NOTE — PROGRESS NOTES
"    [facility] [place] [facility] [place]    Subjective     Chief Complaint   Patient presents with    Right Hip - Pain        HPI    It has been 2  year(s) since Mr. Iverson's last visit. He returns to clinic today for follow-up of right hip pain. The issue has been ongoing for 10 day(s). He rates his pain a 1/10 on the pain scale. Previous/current treatments: oral steroids. Current symptoms: pain. The pain is worse with  nothing in particular ; resting improve the pain. Overall, he is doing better.  He was doing a lot of moving prior to the onset of pain, and then had a Medrol Dosepak with relief of symptoms.  He is doing well today.    I have reviewed the following portions of the patient's history and agree with: History of Present Illness and Review of Systems    Patient Active Problem List   Diagnosis    Primary osteoarthritis of right hip    HTN (hypertension)    HLD (hyperlipidemia)    FAIZAN on CPAP    Chronic pain    Chronic narcotic use    Status post total hip replacement, right    Osteoarthritis     Past Medical History:   Diagnosis Date    Abdominal hernia     Arthritis     GERD (gastroesophageal reflux disease)     Headache     [place] (hard of hearing)     Hyperlipidemia     Hypertriglyceridemia     Migraine     Seasonal allergies     Sleep apnea with use of continuous positive airway pressure (CPAP)     compliant with machine     SOBOE (shortness of breath on exertion)     Wears glasses     Wears partial dentures     \"dental implant\"  tops and most bottom - crowns and implants       Past Surgical History:   Procedure Laterality Date    COLONOSCOPY      DENTAL PROCEDURE  08/2018    Dental Implants    ENDOSCOPY      EPIDURAL      injection in shoulder     HERNIA REPAIR      umbilical hernia on left side     KNEE SURGERY Right 2010    scope    SHOULDER SURGERY Right 2010    labrium and rotator cuff- couple times     TONSILLECTOMY      TOTAL HIP ARTHROPLASTY Right 3/19/2019    Procedure: TOTAL RIGHT HIP " ARTHROPLASTY;  Surgeon: Anil Graham MD;  Location: [facility];  Service: Orthopedics    WISDOM TOOTH EXTRACTION      all 4 removed     WRIST SURGERY Right       Family History   Problem Relation Age of Onset    No Known Problems Mother     Cancer Father      Social History     Socioeconomic History    Marital status: Single   Tobacco Use    Smoking status: Never    Smokeless tobacco: Never   Substance and Sexual Activity    Alcohol use: Yes     Alcohol/week: 14.0 standard drinks     Types: 7 Cans of beer, 7 Shots of liquor per week    Drug use: No    Sexual activity: Defer      Current Outpatient Medications on File Prior to Visit   Medication Sig Dispense Refill    albuterol sulfate  (90 Base) MCG/ACT inhaler 2 puffs.      ALLERGY SERUM INJECTION Inject  under the skin into the appropriate area as directed 1 (One) Time.      ALPRAZolam (XANAX) 0.5 MG tablet Take 1 tablet by mouth 3 (Three) Times a Day As Needed.  2    amLODIPine (NORVASC) 5 MG tablet Take 1 tablet by mouth Daily.  3    atorvastatin (LIPITOR) 40 MG tablet Take 1 tablet by mouth Daily.  3    azelastine (ASTELIN) 0.1 % nasal spray As Needed.      Breo Ellipta 100-25 MCG/ACT aerosol powder  Inhale 1 puff Daily.      desloratadine (CLARINEX) 5 MG tablet Take 1 tablet by mouth Daily.  5    fenofibrate 160 MG tablet Take 1 tablet by mouth Every Night.  3    fluticasone (FLONASE) 50 MCG/ACT nasal spray into the nostril(s) as directed by provider Daily.      Gemtesa 75 MG tablet Take 1 tablet by mouth Daily.      Ginkgo Biloba 60 MG capsule Daily.      HYDROcodone-acetaminophen (NORCO)  MG per tablet Take 1 tablet by mouth Every 6 (Six) Hours As Needed. for pain      ipratropium (ATROVENT) 0.06 % nasal spray 1 spray into the nostril(s) as directed by provider 2 (Two) Times a Day As Needed for Rhinitis.      levocetirizine (XYZAL) 5 MG tablet Take 1 tablet by mouth 2 (Two) Times a Day As Needed.      lisinopril (PRINIVIL,ZESTRIL) 20 MG  tablet Take 1 tablet by mouth Daily.  3    loratadine (CLARITIN) 10 MG tablet Take  by mouth.      Melatonin 10 MG capsule Take 2 capsules by mouth Every Night.      meloxicam (MOBIC) 15 MG tablet TAKE ONE TABLET BY MOUTH ONCE DAILY AS NEEDED FOR ARTHRITIS PAIN      montelukast (SINGULAIR) 10 MG tablet Take 1 tablet by mouth Every Night.  5    Multiple Vitamins-Minerals (MULTIVITAMIN ADULT PO) Take 1 tablet by mouth Daily.      Omega-3 Fatty Acids (FISH OIL) 1200 MG capsule delayed-release Take 1 capsule by mouth 2 (Two) Times a Day. 1290      omeprazole (priLOSEC) 20 MG capsule Take 1 capsule by mouth Daily.      Pediatric Multivitamins-Iron (multivitamin chewable) chewable tablet Chew 1 tablet Daily.      promethazine-dextromethorphan (PROMETHAZINE-DM) 6.25-15 MG/5ML syrup TAKE ONE TEASPOONFUL BY MOUTH EVERY 4 TO 6 HOURS AS NEEDED      sildenafil (REVATIO) 20 MG tablet TAKE UP TO 3 TABLETS BY MOUTH EVERY DAY AS NEEDED  5    tamsulosin (FLOMAX) 0.4 MG capsule 24 hr capsule Take 1 capsule by mouth Every Night.      Testosterone Cypionate (DEPOTESTOTERONE CYPIONATE) 200 MG/ML injection INJECT 1 ML IM EVERY 14 DAYS-  1    Zinc 50 MG capsule Daily.      aspirin  MG EC tablet Take 1 tablet by mouth Daily. (Patient not taking: Reported on 8/9/2023) 30 tablet 0    [DISCONTINUED] diclofenac (VOLTAREN) 75 MG EC tablet Take 1 tablet by mouth Daily. (Patient not taking: Reported on 8/9/2023)  11    [DISCONTINUED] docusate sodium 100 MG capsule Take 100 mg by mouth 2 (Two) Times a Day As Needed for Constipation. (Patient not taking: Reported on 8/9/2023) 40 each 0    [DISCONTINUED] doxycycline (VIBRAMYICN) 100 MG tablet Take 1 tablet by mouth 2 (Two) Times a Day. as directed (Patient not taking: Reported on 8/9/2023)      [DISCONTINUED] gabapentin (NEURONTIN) 800 MG tablet Take 400 mg by mouth 2 (Two) Times a Day. (Patient not taking: Reported on 8/9/2023)  4     No current facility-administered medications on file prior  to visit.      Allergies   Allergen Reactions    Cefazolin Rash    Keflex [Cephalexin] Hives        Review of Systems   Constitutional:  Negative for activity change, appetite change, chills, diaphoresis, fatigue, fever and unexpected weight change.   HENT:  Negative for congestion, dental problem, drooling, ear discharge, ear pain, facial swelling, hearing loss, mouth sores, nosebleeds, postnasal drip, rhinorrhea, sinus pressure, sneezing, sore throat, tinnitus, trouble swallowing and voice change.    Eyes:  Negative for photophobia, pain, discharge, redness, itching and visual disturbance.   Respiratory:  Negative for apnea, cough, choking, chest tightness, shortness of breath, wheezing and stridor.    Cardiovascular:  Negative for chest pain, palpitations and leg swelling.   Gastrointestinal:  Negative for abdominal distention, abdominal pain, anal bleeding, blood in stool, constipation, diarrhea, nausea, rectal pain and vomiting.   Endocrine: Negative for cold intolerance, heat intolerance, polydipsia, polyphagia and polyuria.   Genitourinary:  Negative for decreased urine volume, difficulty urinating, dysuria, enuresis, flank pain, frequency, genital sores, hematuria and urgency.   Musculoskeletal:  Positive for arthralgias. Negative for back pain, gait problem, joint swelling, myalgias, neck pain and neck stiffness.   Skin:  Negative for color change, pallor, rash and wound.   Allergic/Immunologic: Negative for environmental allergies, food allergies and immunocompromised state.   Neurological:  Negative for dizziness, tremors, seizures, syncope, facial asymmetry, speech difficulty, weakness, light-headedness, numbness and headaches.   Hematological:  Negative for adenopathy. Does not bruise/bleed easily.   Psychiatric/Behavioral:  Negative for agitation, behavioral problems, confusion, decreased concentration, dysphoric mood, hallucinations, self-injury, sleep disturbance and suicidal ideas. The patient is  "not nervous/anxious and is not hyperactive.       Objective      Physical Exam  /70   Ht 176.5 cm (69.5\")   Wt 99.8 kg (220 lb)   BMI 32.02 kg/mý     Body mass index is 32.02 kg/mý.    General:   Mental Status:  Alert   Appearance: Cooperative, in no acute distress   Build and Nutrition: Well-nourished well-developed male   Orientation: Alert and oriented to person, place and time   Posture: Normal   Gait: Normal/nonantalgic    Integument:   Right hip: Wound is well-healed with no signs of infection    Lower Extremity:   Right Hip:    Tenderness:  None    Swelling:  None    Crepitus:  None    Range of motion:  External Rotation: 30ø       Internal Rotation: 30ø       Flexion:  100ø       Extension:  0ø    Deformities:  None  Functional testing: Negative Stinchfield    No leg length discrepancy      Imaging/Studies  Imaging Results (Last 24 Hours)       Procedure Component Value Units Date/Time    XR Hip With or Without Pelvis 2 - 3 View Right [[id number]] Resulted: 08/09/23 1325     Updated: 08/09/23 1325    Narrative:      Right Hip Radiographs  Indication: status-post right total hip arthroplasty  Views: low AP pelvis and lateral of the right hip    Comparison: no change compared to prior study, 9/1/2021    Findings:   The components are well aligned, with no signs of loosening or failure.                 Assessment and Plan     Diagnoses and all orders for this visit:    1. Status post total replacement of right hip (Primary)  -     XR Hip With or Without Pelvis 2 - 3 View Right        1. Status post total replacement of right hip        Reviewed my findings with the patient.  I see no clear findings on examination or radiographically that would indicate his hip was the issue.  He has dealt with sciatica previously, and perhaps this was the cause of his pain.  He did respond to a Medrol Dosepak.  He will follow-up with me for routine monitoring of his hip in the future.        Anil Graham, " MD  08/09/23  13:32 EDT

## 2023-10-17 ENCOUNTER — OFFICE VISIT (OUTPATIENT)
Age: 58
End: 2023-10-17
Payer: COMMERCIAL

## 2023-10-17 ENCOUNTER — TREATMENT (OUTPATIENT)
Dept: PHYSICAL THERAPY | Facility: CLINIC | Age: 58
End: 2023-10-17
Payer: COMMERCIAL

## 2023-10-17 VITALS
WEIGHT: 220.6 LBS | SYSTOLIC BLOOD PRESSURE: 128 MMHG | DIASTOLIC BLOOD PRESSURE: 82 MMHG | HEIGHT: 69 IN | BODY MASS INDEX: 32.67 KG/M2

## 2023-10-17 DIAGNOSIS — S62.325A CLOSED DISPLACED FRACTURE OF SHAFT OF FOURTH METACARPAL BONE OF LEFT HAND, INITIAL ENCOUNTER: Primary | ICD-10-CM

## 2023-10-17 DIAGNOSIS — S62.355A CLOSED NONDISPLACED FRACTURE OF SHAFT OF FOURTH METACARPAL BONE OF LEFT HAND, INITIAL ENCOUNTER: Primary | ICD-10-CM

## 2023-10-17 DIAGNOSIS — M79.642 LEFT HAND PAIN: ICD-10-CM

## 2023-10-17 PROCEDURE — L3808 WHFO, RIGID W/O JOINTS: HCPCS | Performed by: PHYSICAL THERAPIST

## 2023-10-17 NOTE — PROGRESS NOTES
[facility]     Office Visit       Date: 10/17/2023   Patient Name: Patricio Iverson  MRN: [id number]  YOB: 1965    Referring Physician: No ref. provider found     Chief Complaint:   Chief Complaint   Patient presents with    Left Hand - Pain     History of Present Illness:   Patricio Iverson is a 58 y.o. male right-hand-dominant presented to clinic as new patient with complaints of left hand pain.  Patient reports injuring his left hand when he was chasing after his cow, DOI 10/8/2023.  Patient endorsed pain and swelling over the dorsal ulnar aspect of the hand which is worse with carrying and lifting objects.  He was able to work through the pain, however this is persistent.  He presented to an urgent care yesterday where x-rays were obtained demonstrating a fourth metacarpal shaft fracture.  He was placed in a splint instructed to follow-up.  Numbness or tingling.  No other complaints or concerns.    Subjective   Review of Systems:   Review of Systems   Constitutional: Negative.  Negative for chills, fatigue and fever.   HENT: Negative.  Negative for congestion and dental problem.    Eyes: Negative.  Negative for blurred vision.   Respiratory: Negative.  Negative for shortness of breath.    Cardiovascular: Negative.  Negative for leg swelling.   Gastrointestinal: Negative.  Negative for abdominal pain.   Endocrine: Negative.  Negative for polyuria.   Genitourinary: Negative.  Negative for difficulty urinating.   Musculoskeletal:  Positive for arthralgias.   Skin: Negative.    Allergic/Immunologic: Negative.    Neurological: Negative.    Hematological: Negative.  Negative for adenopathy.   Psychiatric/Behavioral: Negative.  Negative for behavioral problems.         Pertinent review of systems per HPI    I reviewed the patient's chief complaint, history of present illness, review of systems, past medical history,  "surgical history, family history, social history, medications and allergy list in the EMR on 10/17/2023 and agree with the findings above.    Objective    Vital Signs:   Vitals:    10/17/23 1107   BP: 128/82   Weight: 100 kg (220 lb 9.6 oz)   Height: 176.5 cm (69.49\")      General: No acute distress. Alert and oriented.     Ortho Exam:  Examination of the left upper extremity demonstrates mild swelling over the dorsal ulnar aspect of the hand.  There is tenderness palpation along the fourth metacarpal shaft.  There is recessing noted of the fourth metacarpal head during composite fist.  He is able to make a composite fist with no evidence of malrotation of the ring finger.  All digits point towards the scaphoid tubercle.  This compared to the contralateral side and found to be symmetric.  Patient is able to flex and extend the ring finger with a slight 5 degree extensor lag noted.  Sensation intact to light touch throughout the hand.  Warm and well-perfused distally.    Imaging / Studies:    Imaging Results (Last 24 Hours)       ** No results found for the last 24 hours. **        Left hand x-rays obtained on 10/16/2023 were provided on a disc and personally reviewed.  These demonstrate evidence of a fourth metacarpal shaft fracture with approximately 2 mm of shortening.    Procedures:  Procedures    Assessment / Plan    Assessment/Plan:   Patricio Duenas a 58 y.o. male with a left fourth metacarpal shaft fracture, DOI 10/8/2023.    I discussed with the patient their clinical and radiographic findings demonstrate a fourth metacarpal shaft fracture.  We had a lengthy discussion regarding the pathophysiology of their diagnosis.  His exam demonstrates no malrotation with a slight extensor lag of approximately 5 degrees.  Given these parameters, it is reasonable to pursue nonoperative intervention with full-time splinting.  I discussed with the patient the recessed nature of the fourth metacarpal and slight " extension lag that would be present if we continue nonoperative treatments.  He reports not being bothered by this and is most interested in nonsurgical treatments.  I provided the patient with a hand therapy referral for a custom ulnar gutter splint.  He may begin gentle active range of motion in 2 weeks.  I anticipate 6 to 8 weeks to full fracture union.  They expressed understanding and were agreeable with the plan.  He will return to clinic in 4 to 5 weeks time with repeat x-rays.  All questions and concerns were addressed.       ICD-10-CM ICD-9-CM   1. Closed displaced fracture of shaft of fourth metacarpal bone of left hand, initial encounter  S62.325A 815.03     Follow Up:   Return in about 4 weeks (around 11/14/2023) for Follow Up with xray.      Azucena Ortega MD  [facility] [place] [facility] Sur[facility]
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
## 2023-11-14 ENCOUNTER — OFFICE VISIT (OUTPATIENT)
Age: 58
End: 2023-11-14
Payer: COMMERCIAL

## 2023-11-14 ENCOUNTER — HOSPITAL ENCOUNTER (OUTPATIENT)
Dept: GENERAL RADIOLOGY | Facility: HOSPITAL | Age: 58
Discharge: HOME | End: 2023-11-14
Admitting: STUDENT IN AN ORGANIZED HEALTH CARE EDUCATION/TRAINING PROGRAM
Payer: COMMERCIAL

## 2023-11-14 VITALS
DIASTOLIC BLOOD PRESSURE: 94 MMHG | SYSTOLIC BLOOD PRESSURE: 136 MMHG | WEIGHT: 220.46 LBS | BODY MASS INDEX: 32.65 KG/M2 | HEIGHT: 69 IN

## 2023-11-14 DIAGNOSIS — S62.325D CLOSED DISPLACED FRACTURE OF SHAFT OF FOURTH METACARPAL BONE OF LEFT HAND WITH ROUTINE HEALING, SUBSEQUENT ENCOUNTER: Primary | ICD-10-CM

## 2023-11-14 DIAGNOSIS — M79.642 LEFT HAND PAIN: ICD-10-CM

## 2023-11-14 DIAGNOSIS — Z09 FRACTURE FOLLOW-UP: Primary | ICD-10-CM

## 2023-11-14 PROCEDURE — 99024 POSTOP FOLLOW-UP VISIT: CPT | Performed by: STUDENT IN AN ORGANIZED HEALTH CARE EDUCATION/TRAINING PROGRAM

## 2023-11-14 PROCEDURE — 73130 X-RAY EXAM OF HAND: CPT

## 2023-11-14 NOTE — PROGRESS NOTES
[facility] Orthopedic     Office Visit       Date: 11/14/2023   Patient Name: Patricio Iverson  MRN: [id number]  YOB: 1965    Referring Physician: No ref. provider found     Chief Complaint:   Chief Complaint   Patient presents with    Follow-up     4 week follow up; Closed displaced fracture of shaft of fourth metacarpal bone of left hand (DOI: 10/8/23)     History of Present Illness:   Patricio Iverson is a 58 y.o. male right-hand-dominant presented clinic for follow-up of left fourth metacarpal shaft fracture, DOI 10/8/2023.  Patient's been doing well in his custom removable ulnar gutter splint.  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  Currently reports some minimal pain.  He has been using the splint at all times.  No numbness or tingling.  No other complaints or concerns.    Subjective   Review of Systems:   Review of Systems   Constitutional: Negative.    HENT: Negative.     Eyes: Negative.    Respiratory: Negative.     Cardiovascular: Negative.    Gastrointestinal: Negative.    Endocrine: Negative.    Genitourinary: Negative.    Musculoskeletal:  Positive for arthralgias.   Skin: Negative.    Allergic/Immunologic: Negative.    Neurological: Negative.    Hematological: Negative.    Psychiatric/Behavioral: Negative.          Pertinent review of systems per HPI    I reviewed the patient's chief complaint, history of present illness, review of systems, past medical history, surgical history, family history, social history, medications and allergy list in the EMR on 11/14/2023 and agree with the findings above.    Objective    Quality Measures:   ACP:   ACP discussion was declined by the patient.      Tobacco:   Patricio Iverson  reports that he has never smoked. He has never used smokeless tobacco..     Vital Signs:   Vitals:    11/14/23 0821   BP: 136/94   Weight: 100 kg  "(220 lb 7.4 oz)   Height: 176.5 cm (69.49\")     BMI:      General: No acute distress. Alert and oriented.     Ortho Exam:  Examination of the left upper extremity demonstrates no skin irritation secondary to splint wear.  The splint appears worn especially on the volar aspect.  There is slight tenderness to palpation along the fourth metacarpal with callus formation palpable.  No extensor lag noted with full ascension of the digit.  He is able to make a composite fist.  No malrotation of the digit.  Sensation intact to light touch throughout the hand.  Warm and well-perfused distally.    Imaging / Studies:    Imaging Results (Last 24 Hours)       ** No results found for the last 24 hours. **        Left hand x-rays obtained on 11/14/2020 are personally reviewed.  These demonstrate slight interval healing of fourth metacarpal shaft fracture with unchanged alignment as compared to prior imaging.    Assessment / Plan    Assessment/Plan:   Patricio Duenas a 58 y.o. male with left fourth metacarpal shaft fracture, DOI 10/8/2023.    I would like the patient to continue use of his custom ulnar gutter splint for an additional 2 weeks which would make a total of 6 weeks of immobilization.  He may then begin to wean out as he tolerates.  I encouraged him to come out of his splint several times a day to perform wrist and digit range of motion, however his motion is full today.  I would like him to return back in 4 weeks with repeat x-rays and for reevaluation of pain.  I anticipate that he has completely weaned out of his splint at his next visit.  Patient expressed understanding the plan moving forward.  All questions and concerns were addressed.      ICD-10-CM ICD-9-CM   1. Closed displaced fracture of shaft of fourth metacarpal bone of left hand with routine healing, subsequent encounter  S62.325D V54.19     Follow Up:   Return in about 4 weeks (around 12/12/2023) for Follow Up with repeat xrays.      Azucena POWELL" MD Shannon  [facility] Orthopedic & Hand Surgeon

## 2023-12-14 ENCOUNTER — OFFICE VISIT (OUTPATIENT)
Dept: ORTHOPEDIC SURGERY | Facility: CLINIC | Age: 58
End: 2023-12-14
Payer: COMMERCIAL

## 2023-12-14 DIAGNOSIS — M25.561 RIGHT KNEE PAIN, UNSPECIFIED CHRONICITY: Primary | ICD-10-CM

## 2023-12-14 DIAGNOSIS — M17.11 PRIMARY OSTEOARTHRITIS OF RIGHT KNEE: ICD-10-CM

## 2023-12-14 DIAGNOSIS — M17.12 PRIMARY OSTEOARTHRITIS OF LEFT KNEE: ICD-10-CM

## 2023-12-14 RX ORDER — TRIAMCINOLONE ACETONIDE 40 MG/ML
40 INJECTION, SUSPENSION INTRA-ARTICULAR; INTRAMUSCULAR
Status: COMPLETED | OUTPATIENT
Start: 2023-12-14 | End: 2023-12-14

## 2023-12-14 RX ORDER — LIDOCAINE HYDROCHLORIDE 10 MG/ML
3 INJECTION, SOLUTION EPIDURAL; INFILTRATION; INTRACAUDAL; PERINEURAL
Status: COMPLETED | OUTPATIENT
Start: 2023-12-14 | End: 2023-12-14

## 2023-12-14 RX ADMIN — LIDOCAINE HYDROCHLORIDE 3 ML: 10 INJECTION, SOLUTION EPIDURAL; INFILTRATION; INTRACAUDAL; PERINEURAL at 14:03

## 2023-12-14 RX ADMIN — TRIAMCINOLONE ACETONIDE 40 MG: 40 INJECTION, SUSPENSION INTRA-ARTICULAR; INTRAMUSCULAR at 14:03

## 2023-12-14 NOTE — PROGRESS NOTES
Procedure   - Large Joint Arthrocentesis: bilateral knee on 12/14/2023 2:03 PM  Indications: pain  Details: 21 G needle, anterolateral approach  Medications (Right): 3 mL lidocaine PF 1% 1 %; 40 mg triamcinolone acetonide 40 MG/ML  Medications (Left): 3 mL lidocaine PF 1% 1 %; 40 mg triamcinolone acetonide 40 MG/ML  Outcome: tolerated well, no immediate complications  Procedure, treatment alternatives, risks and benefits explained, specific risks discussed. Consent was given by the patient. Immediately prior to procedure a time out was called to verify the correct patient, procedure, equipment, support staff and site/side marked as required. Patient was prepped and draped in the usual sterile fashion.

## 2023-12-14 NOTE — PROGRESS NOTES
"    [facility] [place] Orthopaedic Surgery Clinic Note        Subjective     CC: Pain of the Right Knee and Follow-up (Primary osteoarthritis of left knee)      HPI    Patricio Iverson is a 58 y.o. male.  Patient is here today for right greater than left knee pain.  Has been seen in the past for his left knee and right shoulder.  Patient is currently cattle farming.  Had an episode where the right knee gave way and it not want to cooperate allowing him to bear weight.    Overall, patient's symptoms are as above.    ROS:    Constiutional:Pt denies fever, chills, nausea, or vomiting.  MSK:as above        Objective      Past Medical History  Past Medical History:   Diagnosis Date    Abdominal hernia     Arthritis     GERD (gastroesophageal reflux disease)     Headache     Andre[place]ski (hard of hearing)     Hyperlipidemia     Hypertriglyceridemia     Migraine     Seasonal allergies     Sleep apnea with use of continuous positive airway pressure (CPAP)     compliant with machine     SOBOE (shortness of breath on exertion)     Wears glasses     Wears partial dentures     \"dental implant\"  tops and most bottom - crowns and implants      Social History     Socioeconomic History    Marital status: Single   Tobacco Use    Smoking status: Never    Smokeless tobacco: Never   Vaping Use    Vaping Use: Never used   Substance and Sexual Activity    Alcohol use: Yes     Alcohol/week: 14.0 standard drinks of alcohol     Types: 7 Cans of beer, 7 Shots of liquor per week    Drug use: No    Sexual activity: Defer          Physical Exam  There were no vitals taken for this visit.    There is no height or weight on file to calculate BMI.    Patient is well nourished and well developed.        Ortho Exam  Peripheral Vascular:    Upper Extremity:   Inspection:  Left--no cyanotic nail beds Right--no cyanotic nail beds   Bilateral:  Pink nail beds with brisk capillary refill   Palpation:  Bilateral radial pulse normal    Musculoskeletal:  Global Assessment:  " Overall assessment of Lower Extremity Muscle Strength and Tone:  Left quadriceps--5/5   Left hamstrings--5/5       Left tibialis anterior--5/5  Left gastroc-soleus--5/5  Left EHL--5/5    Right quadriceps--5/5  Right hamstrings--5/5  Right tibialis anterior--5/5  Right gastroc soleus--5/5  Right EHL--5/5    Lower Extremity:  Knee/Patella:  No digital clubbing or cyanosis.    Examination of left and right knees reveals:  Normal deep tendon reflexes, coordination, strength, tone, sensation.  No known fractures or deformities.    Inspection and Palpation:    Left knee:  Tenderness:  Over the medial joint line and moderate severity  Effusion:  1+  Crepitus:  Positive  Pulses:  2+  Ecchymosis:  None  Warmth:  None     Right knee:  Tenderness:  Over the medial joint line and moderate severity  Effusion:  1+  Crepitus:  Positive  Pulses:  2+  Ecchymosis:  None  Warmth:  None     ROM:  Right:  Extension:5    Flexion:120  Left:  Extension:5     Flexion:120    Instability:    Left:  Lachman Test:  Negative, Varus stress test negative, Valgus stress test negative  Right:  Lachman Test:  Negative, Varus stress test negative, Valgus stress test negative    Deformities/Malalignments/Discrepancies:    Left:  Genu Varum   Right:  Genu Varum    Functional Testing:  Right:  Ritika's test:  Negative  Patella grind test:  Positive  Q-angle:  Normal    Left:  Ritika's test:  Negative  Patella grind test:  Positive  Q-angle:  normal      Imaging/Labs/EMG Reviewed:  Imaging Results (Last 24 Hours)       Procedure Component Value Units Date/Time    XR Knee 4+ View Bilateral [[id number]] Resulted: 12/14/23 1410     Updated: 12/14/23 1412    Narrative:      Left knee X-Ray    Indication: Pain    Study:  Upright AP, Skiers, Lateral, and Sunrise views of Left knee(s)    Comparison: Left knee 1/17/2023    Findings:    Patient appears to have severe degenerative changes in the medial   compartment.    There are mild degenerative changes in the  lateral compartment.    There are moderate to severe changes in the patellofemoral compartment.    Patient has overall varus alignment.    Kellgren-Jose Carlos rdGrdrrdarddrderd:rd rd3rd Impression:   Severe medial compartment and moderate to severe patellofemoral   compartment degnerative changes of the left knee           Right knee X-Ray    Indication: Pain    Study:  Upright AP, Skiers, Lateral, and Sunrise views of Right knee(s)    Comparison: None    Findings:    Patient appears to have moderate degenerative changes in the medial   compartment.    There are minimal degenerative changes in the lateral compartment.    There are moderate changes in the patellofemoral compartment.    Patient has overall varus alignment.    Kellgren-Jose Carlos stGstrstastdstest:st st1st Impression:   Moderate medial compartment and patellofemoral compartment degnerative   changes of the right knee                 Assessment    Assessment:  1. Right knee pain, unspecified chronicity    2. Primary osteoarthritis of left knee    3. Primary osteoarthritis of right knee        Plan:  Recommend over the counter anti-inflammatories for pain and/or swelling  Bilateral knee arthritis--symptoms are worse on the right than the left.  Recommended bilateral corticosteroid injections.  Severe left knee arthritis--we talked about long-term treatment options and alternatives.  He is interested in definitive management.  We had a lengthy discussion regarding the risk, benefits, potential hazards, typical recovery and rehab as well as reasonable alternatives to left robotic assisted total knee arthroplasty.  He would likely do the surgery as an outpatient.  Likely require press-fit implant.  We can finalize therapy and DVT prophylaxis when he returns for his preop appointment.  This surgery should not be scheduled any sooner than 3/14/2024.  Because of the injection today.        Procedure Note:    I discussed with the patient the potential benefits of performing a therapeutic  injections as well as potential risks including but not limited to infection, swelling, pain, bleeding, bruising, nerve/vessel damage, skin color changes, transient elevation in blood glucose levels, and fat atrophy. After informed consent and after the areas were prepped with chlorhexadine soap, ethyl chloride was used to numb the skin. Via the anterolateral approach, 3mL of 1% lidocaine followed by 40mg of Kenalog were each injected into the right and left knee joint. The patient tolerated the procedure well. There were no complications. A sterile dressing was placed over the injection sites.        Rolando Mosqueda MD  12/14/23  16:28 EST      Dictated Utilizing Dragon Dictation.

## 2023-12-19 ENCOUNTER — HOSPITAL ENCOUNTER (OUTPATIENT)
Dept: GENERAL RADIOLOGY | Facility: HOSPITAL | Age: 58
Discharge: HOME | End: 2023-12-19
Admitting: STUDENT IN AN ORGANIZED HEALTH CARE EDUCATION/TRAINING PROGRAM
Payer: COMMERCIAL

## 2023-12-19 ENCOUNTER — OFFICE VISIT (OUTPATIENT)
Age: 58
End: 2023-12-19
Payer: COMMERCIAL

## 2023-12-19 VITALS
WEIGHT: 225.8 LBS | DIASTOLIC BLOOD PRESSURE: 90 MMHG | HEIGHT: 69 IN | BODY MASS INDEX: 33.44 KG/M2 | SYSTOLIC BLOOD PRESSURE: 142 MMHG

## 2023-12-19 DIAGNOSIS — S62.325D CLOSED DISPLACED FRACTURE OF SHAFT OF FOURTH METACARPAL BONE OF LEFT HAND WITH ROUTINE HEALING, SUBSEQUENT ENCOUNTER: Primary | ICD-10-CM

## 2023-12-19 DIAGNOSIS — Z09 FRACTURE FOLLOW-UP: ICD-10-CM

## 2023-12-19 PROCEDURE — 73130 X-RAY EXAM OF HAND: CPT

## 2023-12-19 NOTE — PROGRESS NOTES
[facility] Orthopedic     Office Visit       Date: 12/19/2023   Patient Name: Patricio Iverson  MRN: [id number]  YOB: 1965    Referring Physician: No ref. provider found     Chief Complaint:   Chief Complaint   Patient presents with    Follow-up     5 week follow up; Closed displaced fracture of shaft of fourth metacarpal bone of left hand (DOI: 10/8/23)     History of Present Illness:   Patricio Iverson is a 58 y.o. male right-hand-dominant send to clinic for follow-up of left fourth metacarpal shaft fracture, DOI 10/8/2023.  Patient has weaned out of his removable splint.  He denies any pain or discomfort at rest.  He does endorse some discomfort to the dorsum of the hand when lifting heavy objects. 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  No numbness or tingling.  No injury.  No other complaints or concerns.    Subjective   Review of Systems:   Review of Systems   Constitutional: Negative.    HENT: Negative.     Eyes: Negative.    Respiratory: Negative.     Cardiovascular: Negative.    Gastrointestinal: Negative.    Endocrine: Negative.    Genitourinary: Negative.    Musculoskeletal:  Positive for arthralgias.   Skin: Negative.    Allergic/Immunologic: Negative.    Neurological: Negative.    Hematological: Negative.    Psychiatric/Behavioral: Negative.          Pertinent review of systems per HPI    I reviewed the patient's chief complaint, history of present illness, review of systems, past medical history, surgical history, family history, social history, medications and allergy list in the EMR on 12/19/2023 and agree with the findings above.    Objective    Quality Measures:   ACP:   ACP discussion was declined by the patient.      Tobacco:   Patricio Iverson  reports that he has never smoked. He has never used smokeless tobacco..     Vital Signs:   Vitals:    12/19/23 1042  "  BP: 142/90   Weight: 102 kg (225 lb 12.8 oz)   Height: 176.5 cm (69.49\")     BMI:      General: No acute distress. Alert and oriented.     Ortho Exam:  Examination left hand demonstrates no skin lesions or abrasions.  There is prominence and swelling noted to the dorsal aspect of the metacarpal proximally.  This is nontender to palpation and likely represents underlying fracture callus.  No extensor lag noted with full extension of the digit.  Slight recessing of the fourth metacarpal with composite fist formation.  There is no malrotation.  Sensations intact light touch at the hand.  Warm well-perfused distally.    Imaging / Studies:    Imaging Results (Last 24 Hours)       ** No results found for the last 24 hours. **        Left hand x-rays obtained on 12/19/2023 were personally reviewed.  These demonstrate interval healing of the a fourth metacarpal shaft fracture.    Assessment / Plan    Assessment/Plan:   Patricio Duenas a 58 y.o. male with a left fourth metacarpal shaft fracture, DOI 10/8/2023.    Clinically the patient is doing well with no pain and full range of motion.  His x-rays demonstrate interval healing.  Therefore I will have him to return to his activities without restrictions.  I anticipate that the fracture callus noted to the dorsum of the hand will remodel and consolidate with time.  I will see the patient back as needed.  All questions and concerns were addressed.      ICD-10-CM ICD-9-CM   1. Closed displaced fracture of shaft of fourth metacarpal bone of left hand with routine healing, subsequent encounter  S62.325D V54.19     Follow Up:   Return if symptoms worsen or fail to improve, for Follow Up.      Azucena Ortega MD  [facility] Hand Surgeon  "

## 2024-04-09 ENCOUNTER — PREP FOR SURGERY (OUTPATIENT)
Dept: OTHER | Facility: HOSPITAL | Age: 59
End: 2024-04-09
Payer: COMMERCIAL

## 2024-04-09 ENCOUNTER — OFFICE VISIT (OUTPATIENT)
Dept: ORTHOPEDIC SURGERY | Facility: CLINIC | Age: 59
End: 2024-04-09
Payer: COMMERCIAL

## 2024-04-09 VITALS
WEIGHT: 231 LBS | BODY MASS INDEX: 33.07 KG/M2 | DIASTOLIC BLOOD PRESSURE: 82 MMHG | SYSTOLIC BLOOD PRESSURE: 146 MMHG | HEIGHT: 70 IN

## 2024-04-09 DIAGNOSIS — M17.12 PRIMARY OSTEOARTHRITIS OF LEFT KNEE: Primary | ICD-10-CM

## 2024-04-09 DIAGNOSIS — M75.111 INCOMPLETE TEAR OF RIGHT ROTATOR CUFF, UNSPECIFIED WHETHER TRAUMATIC: ICD-10-CM

## 2024-04-09 DIAGNOSIS — M19.011 ARTHRITIS OF RIGHT ACROMIOCLAVICULAR JOINT: ICD-10-CM

## 2024-04-09 PROBLEM — M17.9 OA (OSTEOARTHRITIS) OF KNEE: Status: ACTIVE | Noted: 2024-04-09

## 2024-04-09 RX ORDER — MOMETASONE FUROATE AND FORMOTEROL FUMARATE DIHYDRATE 200; 5 UG/1; UG/1
AEROSOL RESPIRATORY (INHALATION)
COMMUNITY
Start: 2024-04-08

## 2024-04-09 RX ORDER — TRIAMCINOLONE ACETONIDE 40 MG/ML
40 INJECTION, SUSPENSION INTRA-ARTICULAR; INTRAMUSCULAR
Status: COMPLETED | OUTPATIENT
Start: 2024-04-09 | End: 2024-04-09

## 2024-04-09 RX ORDER — TRIAMCINOLONE ACETONIDE 40 MG/ML
20 INJECTION, SUSPENSION INTRA-ARTICULAR; INTRAMUSCULAR
Status: COMPLETED | OUTPATIENT
Start: 2024-04-09 | End: 2024-04-09

## 2024-04-09 RX ORDER — BUTALBITAL, ACETAMINOPHEN AND CAFFEINE 300; 40; 50 MG/1; MG/1; MG/1
CAPSULE ORAL
COMMUNITY
Start: 2024-01-22

## 2024-04-09 RX ORDER — LIDOCAINE HYDROCHLORIDE 10 MG/ML
1 INJECTION, SOLUTION EPIDURAL; INFILTRATION; INTRACAUDAL; PERINEURAL
Status: COMPLETED | OUTPATIENT
Start: 2024-04-09 | End: 2024-04-09

## 2024-04-09 RX ORDER — TRANEXAMIC ACID 10 MG/ML
1000 INJECTION, SOLUTION INTRAVENOUS ONCE
OUTPATIENT
Start: 2024-04-09 | End: 2024-04-09

## 2024-04-09 RX ORDER — FAMOTIDINE 20 MG/1
1 TABLET, FILM COATED ORAL 2 TIMES DAILY
COMMUNITY
Start: 2024-04-08

## 2024-04-09 RX ORDER — LIDOCAINE HYDROCHLORIDE 10 MG/ML
3 INJECTION, SOLUTION EPIDURAL; INFILTRATION; INTRACAUDAL; PERINEURAL
Status: COMPLETED | OUTPATIENT
Start: 2024-04-09 | End: 2024-04-09

## 2024-04-09 RX ORDER — VANCOMYCIN/0.9 % SOD CHLORIDE 1.5G/250ML
15 PLASTIC BAG, INJECTION (ML) INTRAVENOUS ONCE
OUTPATIENT
Start: 2024-04-09 | End: 2024-04-09

## 2024-04-09 RX ORDER — CHLORHEXIDINE GLUCONATE 4 G/100ML
SOLUTION TOPICAL DAILY
Qty: 236 ML | Refills: 0 | Status: SHIPPED | OUTPATIENT
Start: 2024-04-09

## 2024-04-09 RX ORDER — OXYCODONE HCL 10 MG/1
10 TABLET, FILM COATED, EXTENDED RELEASE ORAL ONCE
OUTPATIENT
Start: 2024-04-09 | End: 2024-04-09

## 2024-04-09 RX ORDER — ACETAMINOPHEN 500 MG
1000 TABLET ORAL ONCE
OUTPATIENT
Start: 2024-04-09 | End: 2024-04-09

## 2024-04-09 RX ADMIN — TRIAMCINOLONE ACETONIDE 40 MG: 40 INJECTION, SUSPENSION INTRA-ARTICULAR; INTRAMUSCULAR at 11:30

## 2024-04-09 RX ADMIN — LIDOCAINE HYDROCHLORIDE 1 ML: 10 INJECTION, SOLUTION EPIDURAL; INFILTRATION; INTRACAUDAL; PERINEURAL at 11:33

## 2024-04-09 RX ADMIN — LIDOCAINE HYDROCHLORIDE 3 ML: 10 INJECTION, SOLUTION EPIDURAL; INFILTRATION; INTRACAUDAL; PERINEURAL at 11:30

## 2024-04-09 RX ADMIN — TRIAMCINOLONE ACETONIDE 20 MG: 40 INJECTION, SUSPENSION INTRA-ARTICULAR; INTRAMUSCULAR at 11:33

## 2024-04-09 NOTE — PROGRESS NOTES
Procedure   - Large Joint Arthrocentesis: L knee on 4/9/2024 11:30 AM  Indications: pain  Details: (23g) needle, anterolateral approach  Medications: 3 mL lidocaine PF 1% 1 %; 40 mg triamcinolone acetonide 40 MG/ML  Outcome: tolerated well, no immediate complications  Procedure, treatment alternatives, risks and benefits explained, specific risks discussed. Consent was given by the patient. Immediately prior to procedure a time out was called to verify the correct patient, procedure, equipment, support staff and site/side marked as required. Patient was prepped and draped in the usual sterile fashion.       Medium Joint Arthrocentesis: R acromioclavicular  Date/Time: 4/9/2024 11:33 AM  Consent given by: patient  Site marked: site marked  Timeout: Immediately prior to procedure a time out was called to verify the correct patient, procedure, equipment, support staff and site/side marked as required   Supporting Documentation  Indications: pain   Procedure Details  Location: shoulder - R acromioclavicular  Preparation: Patient was prepped and draped in the usual sterile fashion  Needle size: 25 G  Approach: superior  Medications administered: 20 mg triamcinolone acetonide 40 MG/ML; 1 mL lidocaine PF 1% 1 %  Patient tolerance: patient tolerated the procedure well with no immediate complications

## 2024-04-09 NOTE — PROGRESS NOTES
"    [facility] [place] Orthopaedic Surgery Clinic Note        Subjective     CC: Follow-up (3.5 month follow up -- Primary osteoarthritis of left knee)      HPI    Patricio Iverson is a 58 y.o. male.  Patient is here today for follow-up of his left knee arthritis and a right AC joint arthritis.  He has been remodeling a house in [place] and flared both his left knee and right shoulder up.    Overall, patient's symptoms are as above    ROS:    Constiutional:Pt denies fever, chills, nausea, or vomiting.  MSK:as above        Objective      Past Medical History  Past Medical History:   Diagnosis Date    Abdominal hernia     Arthritis     GERD (gastroesophageal reflux disease)     Headache     Pala (hard of hearing)     Hyperlipidemia     Hypertriglyceridemia     Migraine     Seasonal allergies     Sleep apnea with use of continuous positive airway pressure (CPAP)     compliant with machine     SOBOE (shortness of breath on exertion)     Wears glasses     Wears partial dentures     \"dental implant\"  tops and most bottom - crowns and implants      Social History     Socioeconomic History    Marital status: Single   Tobacco Use    Smoking status: Never    Smokeless tobacco: Never   Vaping Use    Vaping status: Never Used   Substance and Sexual Activity    Alcohol use: Yes     Alcohol/week: 14.0 standard drinks of alcohol     Types: 7 Cans of beer, 7 Shots of liquor per week    Drug use: No    Sexual activity: Defer          Physical Exam  /82   Ht 176.5 cm (69.5\")   Wt 105 kg (231 lb)   BMI 33.62 kg/m²     Body mass index is 33.62 kg/m².    Patient is well nourished and well developed.        Ortho Exam  Musculoskeletal   Upper Extremity   Right Shoulder       Strength and Tone:    Supraspinatus -4+ out of 5    External Rotators-5/5    Infraspinatus - 5/5    Subscapularis - 5/5    Deltoid - 5/5     Range of Motion      Right Shoulder:    Internal Rotation: ROM - L4    External Rotation: AROM - 70 degrees    Elevation through " flexion: AROM - 140 degrees     AC joint:  tender to palpation    AC joint: Positive crossover          Musculoskeletal:  Global Assessment:  Overall assessment of Lower Extremity Muscle Strength and Tone:  Left quadriceps--5/5   Left hamstrings--5/5       Left tibialis anterior--5/5  Left gastroc-soleus--5/5  Left EHL --5/5    Lower Extremity:    Inspection and Palpation:  Left knee:  Tenderness:  Over the medial joint line and moderate severity  Effusion:  1+  Crepitus:  Positive  Pulses:  2+  Ecchymosis:  None  Warmth:  None     ROM:  Left:  Extension: 5     Flexion:120    Instability:    Left:    Lachman Test:  Negative   Varus stress test negative  Valgus stress test negative    Deformities/Malalignments/Discrepancies:    Left:  Genu Varum     Functional Testing:  Ritika's test:  Negative  Patella grind test:  Positive  Q-angle:  normal        Imaging/Labs/EMG Reviewed:  Imaging Results (Last 24 Hours)       ** No results found for the last 24 hours. **              Assessment    Assessment:  1. Primary osteoarthritis of left knee    2. Arthritis of right acromioclavicular joint    3. Incomplete tear of right rotator cuff, unspecified whether traumatic        Plan:  Recommend over the counter anti-inflammatories for pain and/or swelling  Left knee arthritis--steroid injection will be given today.  Patient is wanting to do something definitively sometime in the fall.  We discussed the risk, benefits, potential hazards, typical recovery and rehab as well as reasonable alternatives to robot-assisted outpatient left TKA.  Given his youth, we will likely try to press-fit the implant.  Immediate outpatient physical therapy will be done at [facility] in [place].  I will see him back preoperatively to finalize plans for hospitalization length, PT, and blood clot prevention.  Right AC joint arthritis--steroid injection given today.      Procedure Note:    I discussed with the patient the potential  benefits of performing a therapeutic injections as well as potential risks including but not limited to infection, swelling, pain, bleeding, bruising, nerve/vessel damage, skin color changes, transient elevation in blood glucose levels, and fat atrophy. After informed consent and after the areas were prepped with chlorhexadine soap, ethyl chloride was used to numb the skin. Via the superior approach, a combination of 1mL of 1% lidocaine and 20mg of Kenalog were injected into the AC joint of the right shoulder. The patient tolerated the procedure well. There were no complications. A sterile dressing was placed over the injection sites.      Procedure Note:    I discussed with the patient the potential benefits of performing a therapeutic injections as well as potential risks including but not limited to infection, swelling, pain, bleeding, bruising, nerve/vessel damage, skin color changes, transient elevation in blood glucose levels, and fat atrophy. After informed consent and after the areas were prepped with chlorhexadine soap, ethyl chloride was used to numb the skin. Via the anterolateral approach, 3mL of 1% lidocaine followed by 40mg of Kenalog were each injected into the left knee joint. The patient tolerated the procedure well. There were no complications. A sterile dressing was placed over the injection sites.         Rolando Mosqueda MD  04/09/24  16:22 EDT      Dictated Utilizing Dragon Dictation.

## 2024-05-13 ENCOUNTER — TRANSCRIBE ORDERS (OUTPATIENT)
Dept: LAB | Facility: HOSPITAL | Age: 59
End: 2024-05-13
Payer: COMMERCIAL

## 2024-05-13 ENCOUNTER — LAB (OUTPATIENT)
Dept: LAB | Facility: HOSPITAL | Age: 59
End: 2024-05-13
Payer: COMMERCIAL

## 2024-05-13 DIAGNOSIS — J45.909 ASTHMATIC BRONCHITIS WITHOUT COMPLICATION, UNSPECIFIED ASTHMA SEVERITY, UNSPECIFIED WHETHER PERSISTENT: ICD-10-CM

## 2024-05-13 DIAGNOSIS — J15.9 PNEUMONIA, BACTERIAL: ICD-10-CM

## 2024-05-13 DIAGNOSIS — J15.9 PNEUMONIA, BACTERIAL: Primary | ICD-10-CM

## 2024-05-13 DIAGNOSIS — B44.1 PULMONARY ASPERGILLOSIS: ICD-10-CM

## 2024-05-13 DIAGNOSIS — B46.5 MUCORMYCOSIS: ICD-10-CM

## 2024-05-13 LAB
ANION GAP SERPL CALCULATED.3IONS-SCNC: 11 MMOL/L (ref 5–15)
BACTERIA SPEC RESP CULT: NORMAL
BASOPHILS # BLD AUTO: 0.03 10*3/MM3 (ref 0–0.2)
BASOPHILS NFR BLD AUTO: 0.4 % (ref 0–1.5)
BUN SERPL-MCNC: 15 MG/DL (ref 6–20)
BUN/CREAT SERPL: 18.5 (ref 7–25)
CALCIUM SPEC-SCNC: 9.9 MG/DL (ref 8.6–10.5)
CHLORIDE SERPL-SCNC: 100 MMOL/L (ref 98–107)
CO2 SERPL-SCNC: 28 MMOL/L (ref 22–29)
CREAT SERPL-MCNC: 0.81 MG/DL (ref 0.76–1.27)
DEPRECATED RDW RBC AUTO: 45.6 FL (ref 37–54)
EGFRCR SERPLBLD CKD-EPI 2021: 102.8 ML/MIN/1.73
EOSINOPHIL # BLD AUTO: 0.05 10*3/MM3 (ref 0–0.4)
EOSINOPHIL NFR BLD AUTO: 0.7 % (ref 0.3–6.2)
ERYTHROCYTE [DISTWIDTH] IN BLOOD BY AUTOMATED COUNT: 12.7 % (ref 12.3–15.4)
GLUCOSE SERPL-MCNC: 105 MG/DL (ref 65–99)
GRAM STN SPEC: NORMAL
HCT VFR BLD AUTO: 49.6 % (ref 37.5–51)
HGB BLD-MCNC: 16.7 G/DL (ref 13–17.7)
IMM GRANULOCYTES # BLD AUTO: 0.03 10*3/MM3 (ref 0–0.05)
IMM GRANULOCYTES NFR BLD AUTO: 0.4 % (ref 0–0.5)
LYMPHOCYTES # BLD AUTO: 2.04 10*3/MM3 (ref 0.7–3.1)
LYMPHOCYTES NFR BLD AUTO: 30.4 % (ref 19.6–45.3)
MCH RBC QN AUTO: 32.8 PG (ref 26.6–33)
MCHC RBC AUTO-ENTMCNC: 33.7 G/DL (ref 31.5–35.7)
MCV RBC AUTO: 97.4 FL (ref 79–97)
MONOCYTES # BLD AUTO: 0.64 10*3/MM3 (ref 0.1–0.9)
MONOCYTES NFR BLD AUTO: 9.5 % (ref 5–12)
NEUTROPHILS NFR BLD AUTO: 3.93 10*3/MM3 (ref 1.7–7)
NEUTROPHILS NFR BLD AUTO: 58.6 % (ref 42.7–76)
NRBC BLD AUTO-RTO: 0 /100 WBC (ref 0–0.2)
PLATELET # BLD AUTO: 217 10*3/MM3 (ref 140–450)
PMV BLD AUTO: 8.6 FL (ref 6–12)
POTASSIUM SERPL-SCNC: 4 MMOL/L (ref 3.5–5.2)
RBC # BLD AUTO: 5.09 10*6/MM3 (ref 4.14–5.8)
SODIUM SERPL-SCNC: 139 MMOL/L (ref 136–145)
WBC NRBC COR # BLD AUTO: 6.72 10*3/MM3 (ref 3.4–10.8)

## 2024-05-13 PROCEDURE — 80048 BASIC METABOLIC PNL TOTAL CA: CPT

## 2024-05-13 PROCEDURE — 36415 COLL VENOUS BLD VENIPUNCTURE: CPT

## 2024-05-13 PROCEDURE — 87102 FUNGUS ISOLATION CULTURE: CPT

## 2024-05-13 PROCEDURE — 87205 SMEAR GRAM STAIN: CPT

## 2024-05-13 PROCEDURE — 85025 COMPLETE CBC W/AUTO DIFF WBC: CPT

## 2024-05-29 LAB — FUNGUS WND CULT: NORMAL

## 2024-05-30 ENCOUNTER — TELEPHONE (OUTPATIENT)
Dept: ORTHOPEDIC SURGERY | Facility: CLINIC | Age: 59
End: 2024-05-30

## 2024-05-30 NOTE — TELEPHONE ENCOUNTER
Caller: Salina Pascual    Relationship to patient: Emergency Contact    Best call back number: [phone]    Chief complaint: LEFT KNEE     Type of visit: SURGERY     If rescheduling, when is the original appointment: 10/02/2024     Additional notes:STATES THAT SHE HAS LEFT MANY VOICEMAILS SINCE LAST WEEK

## 2024-06-21 LAB — FUNGUS WND CULT: ABNORMAL

## 2024-07-11 LAB — FUNGUS WND CULT: ABNORMAL

## 2024-07-22 DIAGNOSIS — M17.12 PRIMARY OSTEOARTHRITIS OF LEFT KNEE: Primary | ICD-10-CM

## 2024-08-01 ENCOUNTER — OFFICE VISIT (OUTPATIENT)
Dept: ORTHOPEDIC SURGERY | Facility: CLINIC | Age: 59
End: 2024-08-01
Payer: COMMERCIAL

## 2024-08-01 ENCOUNTER — PRE-ADMISSION TESTING (OUTPATIENT)
Dept: PREADMISSION TESTING | Facility: HOSPITAL | Age: 59
End: 2024-08-01
Payer: COMMERCIAL

## 2024-08-01 VITALS
WEIGHT: 220.46 LBS | DIASTOLIC BLOOD PRESSURE: 78 MMHG | SYSTOLIC BLOOD PRESSURE: 140 MMHG | HEIGHT: 71 IN | BODY MASS INDEX: 30.86 KG/M2

## 2024-08-01 VITALS — WEIGHT: 221.45 LBS | HEIGHT: 71 IN | BODY MASS INDEX: 31 KG/M2

## 2024-08-01 DIAGNOSIS — M17.12 PRIMARY OSTEOARTHRITIS OF LEFT KNEE: ICD-10-CM

## 2024-08-01 DIAGNOSIS — M17.12 PRIMARY OSTEOARTHRITIS OF LEFT KNEE: Primary | ICD-10-CM

## 2024-08-01 LAB
ALBUMIN SERPL-MCNC: 4.5 G/DL (ref 3.5–5.2)
ALBUMIN/GLOB SERPL: 1.6 G/DL
ALP SERPL-CCNC: 86 U/L (ref 39–117)
ALT SERPL W P-5'-P-CCNC: 62 U/L (ref 1–41)
ANION GAP SERPL CALCULATED.3IONS-SCNC: 10 MMOL/L (ref 5–15)
AST SERPL-CCNC: 31 U/L (ref 1–40)
BASOPHILS # BLD AUTO: 0.02 10*3/MM3 (ref 0–0.2)
BASOPHILS NFR BLD AUTO: 0.3 % (ref 0–1.5)
BILIRUB SERPL-MCNC: 0.9 MG/DL (ref 0–1.2)
BUN SERPL-MCNC: 13 MG/DL (ref 6–20)
BUN/CREAT SERPL: 13.8 (ref 7–25)
CALCIUM SPEC-SCNC: 9.6 MG/DL (ref 8.6–10.5)
CHLORIDE SERPL-SCNC: 99 MMOL/L (ref 98–107)
CO2 SERPL-SCNC: 29 MMOL/L (ref 22–29)
CREAT SERPL-MCNC: 0.94 MG/DL (ref 0.76–1.27)
CRP SERPL-MCNC: <0.3 MG/DL (ref 0–0.5)
DEPRECATED RDW RBC AUTO: 46.1 FL (ref 37–54)
EGFRCR SERPLBLD CKD-EPI 2021: 94.6 ML/MIN/1.73
EOSINOPHIL # BLD AUTO: 0.11 10*3/MM3 (ref 0–0.4)
EOSINOPHIL NFR BLD AUTO: 1.6 % (ref 0.3–6.2)
ERYTHROCYTE [DISTWIDTH] IN BLOOD BY AUTOMATED COUNT: 13 % (ref 12.3–15.4)
ERYTHROCYTE [SEDIMENTATION RATE] IN BLOOD: 17 MM/HR (ref 0–20)
GLOBULIN UR ELPH-MCNC: 2.8 GM/DL
GLUCOSE SERPL-MCNC: 117 MG/DL (ref 65–99)
HBA1C MFR BLD: 5.2 % (ref 4.8–5.6)
HCT VFR BLD AUTO: 48.8 % (ref 37.5–51)
HGB BLD-MCNC: 16.6 G/DL (ref 13–17.7)
IMM GRANULOCYTES # BLD AUTO: 0.02 10*3/MM3 (ref 0–0.05)
IMM GRANULOCYTES NFR BLD AUTO: 0.3 % (ref 0–0.5)
LYMPHOCYTES # BLD AUTO: 1.64 10*3/MM3 (ref 0.7–3.1)
LYMPHOCYTES NFR BLD AUTO: 23.3 % (ref 19.6–45.3)
MCH RBC QN AUTO: 33.3 PG (ref 26.6–33)
MCHC RBC AUTO-ENTMCNC: 34 G/DL (ref 31.5–35.7)
MCV RBC AUTO: 98 FL (ref 79–97)
MONOCYTES # BLD AUTO: 0.79 10*3/MM3 (ref 0.1–0.9)
MONOCYTES NFR BLD AUTO: 11.2 % (ref 5–12)
NEUTROPHILS NFR BLD AUTO: 4.45 10*3/MM3 (ref 1.7–7)
NEUTROPHILS NFR BLD AUTO: 63.3 % (ref 42.7–76)
NRBC BLD AUTO-RTO: 0 /100 WBC (ref 0–0.2)
PLATELET # BLD AUTO: 232 10*3/MM3 (ref 140–450)
PMV BLD AUTO: 8.8 FL (ref 6–12)
POTASSIUM SERPL-SCNC: 4 MMOL/L (ref 3.5–5.2)
PROT SERPL-MCNC: 7.3 G/DL (ref 6–8.5)
RBC # BLD AUTO: 4.98 10*6/MM3 (ref 4.14–5.8)
SODIUM SERPL-SCNC: 138 MMOL/L (ref 136–145)
WBC NRBC COR # BLD AUTO: 7.03 10*3/MM3 (ref 3.4–10.8)

## 2024-08-01 PROCEDURE — 80053 COMPREHEN METABOLIC PANEL: CPT

## 2024-08-01 PROCEDURE — 86140 C-REACTIVE PROTEIN: CPT

## 2024-08-01 PROCEDURE — 83036 HEMOGLOBIN GLYCOSYLATED A1C: CPT

## 2024-08-01 PROCEDURE — 93005 ELECTROCARDIOGRAM TRACING: CPT

## 2024-08-01 PROCEDURE — 85652 RBC SED RATE AUTOMATED: CPT

## 2024-08-01 PROCEDURE — 36415 COLL VENOUS BLD VENIPUNCTURE: CPT

## 2024-08-01 PROCEDURE — 85025 COMPLETE CBC W/AUTO DIFF WBC: CPT

## 2024-08-01 RX ORDER — MULTIVIT-MIN/IRON/FOLIC ACID/K 18-600-40
1 CAPSULE ORAL DAILY
COMMUNITY

## 2024-08-01 NOTE — PROGRESS NOTES
"    [facility] [place] [facility] Note        Subjective     CC: Follow-up (4 month follow up -Primary osteoarthritis of left knee)      HPI    Patricio Iverson is a 58 y.o. male.  Patient is here with his significant other for which preop appointment for left TKA scheduled tentatively for 8/14/2024.  Says he is nervous about the surgery.  Says he has had a chronic cough and has had multiple test and workups including with pulmonology and infectious diseases.  Patient has lost a little bit of weight.  Left knee continues to bother him although he admits today not as much as it was on 4/9/2024.    Overall, patient's symptoms are as above    ROS:    Constiutional:Pt denies fever, chills, nausea, or vomiting.  MSK:as above        Objective      Past Medical History  Past Medical History:   Diagnosis Date   • Abdominal hernia    • Anesthesia complication     \"hard to put to sleep\" duroing bronch   • Arthritis    • GERD (gastroesophageal reflux disease)    • Headache    • [place] (hard of hearing)    • Hyperlipidemia    • Hypertension    • Hypertriglyceridemia    • Migraine    • Seasonal allergies    • Sleep apnea with use of continuous positive airway pressure (CPAP)     compliant with machine    • SOBOE (shortness of breath on exertion)    • Wears glasses    • Wears partial dentures     \"dental implant\"  tops and most bottom - crowns and implants  non-removable     Social History     Socioeconomic History   • Marital status: Single   Tobacco Use   • Smoking status: Never   • Smokeless tobacco: Never   Vaping Use   • Vaping status: Never Used   Substance and Sexual Activity   • Alcohol use: Yes     Alcohol/week: 14.0 standard drinks of alcohol     Types: 7 Cans of beer, 7 Shots of liquor per week   • Drug use: No   • Sexual activity: Defer          Physical Exam  /78   Ht 180.3 cm (71\")   Wt 100 kg (220 lb 7.4 oz)   BMI 30.75 kg/m²     Body mass index is 30.75 kg/m².    Patient is well nourished and well " developed.        Ortho Exam      Musculoskeletal:  Global Assessment:  Overall assessment of Lower Extremity Muscle Strength and Tone:  Left quadriceps--5/5   Left hamstrings--5/5       Left tibialis anterior--5/5  Left gastroc-soleus--5/5  Left EHL --5/5    Lower Extremity:    Inspection and Palpation:  Left knee:  Tenderness:  Over the medial joint line and moderate severity  Effusion:  1+  Crepitus:  Positive  Pulses:  2+  Ecchymosis:  None  Warmth:  None     ROM:  Left:  Extension: 5     Flexion:120    Instability:    Left:    Lachman Test:  Negative   Varus stress test negative  Valgus stress test negative    Deformities/Malalignments/Discrepancies:    Left:  Genu Varum     Functional Testing:  Ritika's test:  Negative  Patella grind test:  Positive  Q-angle:  normal      Imaging/Labs/EMG Reviewed and Interpreted:  Imaging Results (Last 24 Hours)       ** No results found for the last 24 hours. **              Assessment    Assessment:  1. Primary osteoarthritis of left knee        Plan:  Recommend over the counter anti-inflammatories for pain and/or swelling  Left knee arthritis--we again reviewed the risk, benefits, potential hazards, typical recovery and rehab as well as reasonable alternatives to robot-assisted left TKA.  Secondary to his age, we will attempt to press-fit the implant.  Talked about the pros and cons of that including soreness that may persist.  He will stay overnight.  Baby aspirin twice daily for DVT prophylaxis.  [facility] in [place] for outpatient physical therapy to start the Friday after surgery.  Will asked that he get pulmonary and ID clearance.  Sed rate and CRP are normal today which is reassuring overall.  Will go to [facility] today to make sure he is get the appropriate clearances.      Rolando Mosqueda MD  08/01/24  12:50 EDT      Dictated Utilizing Dragon Dictation.

## 2024-08-01 NOTE — PAT
An arrival time for procedure was not provided during PAT visit. If patient had any questions or concerns about their arrival time, they were instructed to contact their surgeon/physician.  Additionally, if the patient referred to an arrival time that was acquired from their my chart account, patient was encouraged to verify that time with their surgeon/physician. Arrival times are NOT provided in Pre Admission Testing Department.     Clean catch urinalysis not indicated because patient denied recent urinary frequency, urinary urgency, burning/pain upon urination, or flank pain. No recent UTIs.    Prescription for Chlorhexidine shower called into patient's pharmacy or [facility] pharmacy by patient's surgeon.  Reinforced with patient to  the prescription from applicable pharmacy if they haven't already.  Verbal and written instructions given regarding proper use of Chlorhexidine body wash to patient and/or famlily during PAT visit. Patient/family also instructed to complete checklist and return it to Pre-op on the day of surgery.  Patient and/or family verbalized understanding.    Patient to apply Chlorhexadine wipes  to surgical area (as instructed) the night before procedure and the AM of procedure. Wipes provided.    Patient instructed to drink 20 ounces of Gatorade or Gatorlyte (if diabetic) and it needs to be completed 1 hour (for Main OR patients) or 2 hours (scheduled  section & [facility] patients) before given arrival time for procedure (NO RED Gatorade and NO Gatorade Zero).    Patient verbalized understanding.    Discussed with patient options for receiving total joint replacement education and assessed patient's ability and preference. Joint Replacement Guide given to patient during PAT visit since not received a copy within the last year. Encouraged patient/family to read guide thoroughly and notify PAT staff with any questions or concerns. Handout provided directing patient to links to watch online  videos related to joint replacement surgery on the [facility] website. The handout gives detailed instructions for joining an online joint replacement class through Zoom or phone conference offered on Thursdays. Patient agreed to participate by watching videos online. Patient verbalized understanding of instructions and to complete the online learning tool survey. Encouraged to share information with family and/or . An overview of the joint replacement education was provided during the visit including general perioperative instructions that are routine for all surgical patients (PAT PASS, wipes, directions to pre-op, etc.).

## 2024-08-02 LAB
QT INTERVAL: 410 MS
QTC INTERVAL: 416 MS

## 2024-08-09 ENCOUNTER — TELEPHONE (OUTPATIENT)
Dept: ORTHOPEDIC SURGERY | Facility: CLINIC | Age: 59
End: 2024-08-09
Payer: COMMERCIAL

## 2024-08-09 NOTE — TELEPHONE ENCOUNTER
I received a call back from [facility] with [facility] Christiano Shen.    Dr. Shen is not comfortable clearing the patient for a TKA, without seeing him in the office first. First available appointment for clearance is 08/15/24.     I called and spoke with the patient, and his S/O Salina. Salina stated they have an appointment on Monday @ 8:30 with Pulmonology, and they would like to just use that for his clearance. Salina stated that they were not pleased with the Infectious Disease Clinic, and do not want to be seen by them again. I advised them that both pulmonology, and Infectious Disease clearances were requested, therefor we would need both in order to move forward with surgery.     Patient and S/O requesting your input on what needs to be done.

## 2024-08-12 ENCOUNTER — TELEPHONE (OUTPATIENT)
Dept: ORTHOPEDIC SURGERY | Facility: CLINIC | Age: 59
End: 2024-08-12
Payer: COMMERCIAL

## 2024-08-12 NOTE — TELEPHONE ENCOUNTER
Hub staff attempted to follow warm transfer process and was unsuccessful     Caller: Jarod Pascual    Relationship to patient: Emergency Contact    Best call back number: [phone] (home)       Patient is needing: JAROD CALLED TO SPEAK WITH SOMEONE SHE TALKED TO ON FRIDAY. SHE AND HER  SAW THE PULMONOLOGIST THIS MORNING. HE HAS BEEN CLEARED. JAROD, SIGNIFICANT OTHER CALLED TO LET THEM KNOW THAT CHERRY (THE PATIENT) IS STILL ON THE SCHEDULE FOR SX

## 2024-08-12 NOTE — TELEPHONE ENCOUNTER
"Received office note from [facility].     Note will be scanned.     Pre-op portion of the note states \"at this point asthma symptoms were fairly stable. However, he would be considered at Intermediate risk, from Pulmonary standpoint given history of asthma, obesity and constructive sleep apnea. General precautions are recommended perioperatively, aspiration precautions, maintaining saturations more than 88%, use BIPAP if needed, Incentive spirometer to prevent atelectasis.\"  "

## 2024-08-14 ENCOUNTER — ANESTHESIA EVENT (OUTPATIENT)
Dept: PERIOP | Facility: HOSPITAL | Age: 59
End: 2024-08-14
Payer: COMMERCIAL

## 2024-08-14 ENCOUNTER — ANESTHESIA EVENT CONVERTED (OUTPATIENT)
Dept: ANESTHESIOLOGY | Facility: HOSPITAL | Age: 59
End: 2024-08-14
Payer: COMMERCIAL

## 2024-08-14 ENCOUNTER — APPOINTMENT (OUTPATIENT)
Dept: GENERAL RADIOLOGY | Facility: HOSPITAL | Age: 59
End: 2024-08-14
Payer: COMMERCIAL

## 2024-08-14 ENCOUNTER — HOSPITAL ENCOUNTER (OUTPATIENT)
Facility: HOSPITAL | Age: 59
Discharge: HOME | End: 2024-08-15
Attending: ORTHOPAEDIC SURGERY | Admitting: ORTHOPAEDIC SURGERY
Payer: COMMERCIAL

## 2024-08-14 ENCOUNTER — TRANSITIONAL CARE MANAGEMENT TELEPHONE ENCOUNTER (OUTPATIENT)
Dept: ORTHOPEDIC SURGERY | Facility: CLINIC | Age: 59
End: 2024-08-14
Payer: COMMERCIAL

## 2024-08-14 ENCOUNTER — ANESTHESIA (OUTPATIENT)
Dept: PERIOP | Facility: HOSPITAL | Age: 59
End: 2024-08-14
Payer: COMMERCIAL

## 2024-08-14 DIAGNOSIS — M17.12 PRIMARY OSTEOARTHRITIS OF LEFT KNEE: ICD-10-CM

## 2024-08-14 DIAGNOSIS — M17.12 PRIMARY OSTEOARTHRITIS OF LEFT KNEE: Primary | ICD-10-CM

## 2024-08-14 DIAGNOSIS — Z96.652 STATUS POST TOTAL LEFT KNEE REPLACEMENT: ICD-10-CM

## 2024-08-14 LAB — GLUCOSE BLDC GLUCOMTR-MCNC: 95 MG/DL (ref 70–130)

## 2024-08-14 PROCEDURE — 25010000002 MORPHINE PER 10 MG: Performed by: ORTHOPAEDIC SURGERY

## 2024-08-14 PROCEDURE — 27447 TOTAL KNEE ARTHROPLASTY: CPT | Performed by: PHYSICIAN ASSISTANT

## 2024-08-14 PROCEDURE — C1776 JOINT DEVICE (IMPLANTABLE): HCPCS | Performed by: ORTHOPAEDIC SURGERY

## 2024-08-14 PROCEDURE — 25010000002 MIDAZOLAM PER 1 MG

## 2024-08-14 PROCEDURE — 73560 X-RAY EXAM OF KNEE 1 OR 2: CPT

## 2024-08-14 PROCEDURE — 94664 DEMO&/EVAL PT USE INHALER: CPT

## 2024-08-14 PROCEDURE — 82948 REAGENT STRIP/BLOOD GLUCOSE: CPT

## 2024-08-14 PROCEDURE — 25010000002 ONDANSETRON PER 1 MG

## 2024-08-14 PROCEDURE — 25010000002 DEXAMETHASONE PER 1 MG

## 2024-08-14 PROCEDURE — 20985 CPTR-ASST DIR MS PX: CPT | Performed by: PHYSICIAN ASSISTANT

## 2024-08-14 PROCEDURE — 25810000003 LACTATED RINGERS PER 1000 ML: Performed by: ANESTHESIOLOGY

## 2024-08-14 PROCEDURE — 25010000002 KETOROLAC TROMETHAMINE PER 15 MG: Performed by: ORTHOPAEDIC SURGERY

## 2024-08-14 PROCEDURE — 25010000002 PROPOFOL 10 MG/ML EMULSION

## 2024-08-14 PROCEDURE — C1713 ANCHOR/SCREW BN/BN,TIS/BN: HCPCS | Performed by: ORTHOPAEDIC SURGERY

## 2024-08-14 PROCEDURE — 27447 TOTAL KNEE ARTHROPLASTY: CPT | Performed by: ORTHOPAEDIC SURGERY

## 2024-08-14 PROCEDURE — 25010000002 ROPIVACAINE PER 1 MG: Performed by: ORTHOPAEDIC SURGERY

## 2024-08-14 PROCEDURE — 94640 AIRWAY INHALATION TREATMENT: CPT

## 2024-08-14 PROCEDURE — 25010000002 SODIUM CHLORIDE 0.9 % WITH KCL 20 MEQ 20-0.9 MEQ/L-% SOLUTION: Performed by: ORTHOPAEDIC SURGERY

## 2024-08-14 PROCEDURE — 20985 CPTR-ASST DIR MS PX: CPT | Performed by: ORTHOPAEDIC SURGERY

## 2024-08-14 PROCEDURE — 25010000002 BUPIVACAINE 0.5 % SOLUTION: Performed by: ANESTHESIOLOGY

## 2024-08-14 PROCEDURE — C1755 CATHETER, INTRASPINAL: HCPCS | Performed by: ORTHOPAEDIC SURGERY

## 2024-08-14 PROCEDURE — 25010000002 VANCOMYCIN HCL IN NACL 1.5-0.9 GM/500ML-% SOLUTION: Performed by: ORTHOPAEDIC SURGERY

## 2024-08-14 DEVICE — VIOLET ANTIBACTERIAL POLYDIOXANONE, KNOTLESS TISSUE CONTROL DEVICE
Type: IMPLANTABLE DEVICE | Site: KNEE | Status: FUNCTIONAL
Brand: STRATAFIX

## 2024-08-14 DEVICE — ART/SRF KN PERSONA/VE PS EF 8TO11 10MM LT: Type: IMPLANTABLE DEVICE | Site: KNEE | Status: FUNCTIONAL

## 2024-08-14 DEVICE — PAT NXGN POR 10X32MM: Type: IMPLANTABLE DEVICE | Site: KNEE | Status: FUNCTIONAL

## 2024-08-14 DEVICE — BASEPLT TIB/KN OSSEOTI PERSONA KEEL CMTLS 0DEG SZF LT: Type: IMPLANTABLE DEVICE | Site: KNEE | Status: FUNCTIONAL

## 2024-08-14 DEVICE — CAP TOTL KN POROUS/HYBRID PRIMARY W/ROSA: Type: IMPLANTABLE DEVICE | Site: KNEE | Status: FUNCTIONAL

## 2024-08-14 DEVICE — COMP FEM/KN PERSONA CR POR COCR STD SZ8 LT: Type: IMPLANTABLE DEVICE | Site: KNEE | Status: FUNCTIONAL

## 2024-08-14 RX ORDER — HYDROMORPHONE HYDROCHLORIDE 1 MG/ML
0.5 INJECTION, SOLUTION INTRAMUSCULAR; INTRAVENOUS; SUBCUTANEOUS
Status: DISCONTINUED | OUTPATIENT
Start: 2024-08-14 | End: 2024-08-14 | Stop reason: HOSPADM

## 2024-08-14 RX ORDER — ASPIRIN 81 MG/1
81 TABLET ORAL 2 TIMES DAILY
Qty: 84 TABLET | Refills: 0 | Status: SHIPPED | OUTPATIENT
Start: 2024-08-15

## 2024-08-14 RX ORDER — SODIUM CHLORIDE 9 MG/ML
40 INJECTION, SOLUTION INTRAVENOUS AS NEEDED
Status: DISCONTINUED | OUTPATIENT
Start: 2024-08-14 | End: 2024-08-15 | Stop reason: HOSPADM

## 2024-08-14 RX ORDER — FAMOTIDINE 20 MG/1
20 TABLET, FILM COATED ORAL 2 TIMES DAILY
Status: DISCONTINUED | OUTPATIENT
Start: 2024-08-14 | End: 2024-08-15 | Stop reason: HOSPADM

## 2024-08-14 RX ORDER — OXYCODONE HCL 10 MG/1
10 TABLET, FILM COATED, EXTENDED RELEASE ORAL ONCE
Status: COMPLETED | OUTPATIENT
Start: 2024-08-14 | End: 2024-08-14

## 2024-08-14 RX ORDER — ALPRAZOLAM 0.5 MG/1
0.5 TABLET ORAL NIGHTLY PRN
Status: DISCONTINUED | OUTPATIENT
Start: 2024-08-14 | End: 2024-08-15 | Stop reason: HOSPADM

## 2024-08-14 RX ORDER — VANCOMYCIN/0.9 % SOD CHLORIDE 1.5G/250ML
15 PLASTIC BAG, INJECTION (ML) INTRAVENOUS ONCE
Status: COMPLETED | OUTPATIENT
Start: 2024-08-14 | End: 2024-08-14

## 2024-08-14 RX ORDER — MELOXICAM 15 MG/1
15 TABLET ORAL DAILY
Status: DISCONTINUED | OUTPATIENT
Start: 2024-08-15 | End: 2024-08-15 | Stop reason: HOSPADM

## 2024-08-14 RX ORDER — MONTELUKAST SODIUM 10 MG/1
10 TABLET ORAL NIGHTLY
Status: DISCONTINUED | OUTPATIENT
Start: 2024-08-14 | End: 2024-08-15 | Stop reason: HOSPADM

## 2024-08-14 RX ORDER — DOXYCYCLINE 100 MG/1
100 TABLET ORAL 2 TIMES DAILY
Qty: 14 TABLET | Refills: 0 | Status: SHIPPED | OUTPATIENT
Start: 2024-08-14 | End: 2024-08-22

## 2024-08-14 RX ORDER — SODIUM CHLORIDE 0.9 % (FLUSH) 0.9 %
3 SYRINGE (ML) INJECTION EVERY 12 HOURS SCHEDULED
Status: DISCONTINUED | OUTPATIENT
Start: 2024-08-14 | End: 2024-08-14 | Stop reason: HOSPADM

## 2024-08-14 RX ORDER — FAMOTIDINE 20 MG/1
20 TABLET, FILM COATED ORAL ONCE
Status: DISCONTINUED | OUTPATIENT
Start: 2024-08-14 | End: 2024-08-14 | Stop reason: HOSPADM

## 2024-08-14 RX ORDER — SODIUM CHLORIDE 0.9 % (FLUSH) 0.9 %
3-10 SYRINGE (ML) INJECTION AS NEEDED
Status: DISCONTINUED | OUTPATIENT
Start: 2024-08-14 | End: 2024-08-15 | Stop reason: HOSPADM

## 2024-08-14 RX ORDER — BUDESONIDE AND FORMOTEROL FUMARATE DIHYDRATE 160; 4.5 UG/1; UG/1
1 AEROSOL RESPIRATORY (INHALATION)
Status: DISCONTINUED | OUTPATIENT
Start: 2024-08-14 | End: 2024-08-14 | Stop reason: SDUPTHER

## 2024-08-14 RX ORDER — TAMSULOSIN HYDROCHLORIDE 0.4 MG/1
0.4 CAPSULE ORAL NIGHTLY PRN
Status: DISCONTINUED | OUTPATIENT
Start: 2024-08-14 | End: 2024-08-15 | Stop reason: HOSPADM

## 2024-08-14 RX ORDER — ACETAMINOPHEN 500 MG
1000 TABLET ORAL EVERY 6 HOURS
Status: DISCONTINUED | OUTPATIENT
Start: 2024-08-14 | End: 2024-08-15 | Stop reason: HOSPADM

## 2024-08-14 RX ORDER — DEXAMETHASONE SODIUM PHOSPHATE 4 MG/ML
INJECTION, SOLUTION INTRA-ARTICULAR; INTRALESIONAL; INTRAMUSCULAR; INTRAVENOUS; SOFT TISSUE AS NEEDED
Status: DISCONTINUED | OUTPATIENT
Start: 2024-08-14 | End: 2024-08-14 | Stop reason: SURG

## 2024-08-14 RX ORDER — TRANEXAMIC ACID 10 MG/ML
1000 INJECTION, SOLUTION INTRAVENOUS ONCE
Status: COMPLETED | OUTPATIENT
Start: 2024-08-14 | End: 2024-08-14

## 2024-08-14 RX ORDER — IPRATROPIUM BROMIDE AND ALBUTEROL SULFATE 2.5; .5 MG/3ML; MG/3ML
3 SOLUTION RESPIRATORY (INHALATION) ONCE AS NEEDED
Status: DISCONTINUED | OUTPATIENT
Start: 2024-08-14 | End: 2024-08-14 | Stop reason: HOSPADM

## 2024-08-14 RX ORDER — ROPIVACAINE HYDROCHLORIDE 2 MG/ML
INJECTION, SOLUTION EPIDURAL; INFILTRATION; PERINEURAL CONTINUOUS
Status: DISCONTINUED | OUTPATIENT
Start: 2024-08-14 | End: 2024-08-15 | Stop reason: HOSPADM

## 2024-08-14 RX ORDER — SODIUM CHLORIDE, SODIUM LACTATE, POTASSIUM CHLORIDE, CALCIUM CHLORIDE 600; 310; 30; 20 MG/100ML; MG/100ML; MG/100ML; MG/100ML
9 INJECTION, SOLUTION INTRAVENOUS CONTINUOUS
Status: DISCONTINUED | OUTPATIENT
Start: 2024-08-14 | End: 2024-08-14

## 2024-08-14 RX ORDER — DROPERIDOL 2.5 MG/ML
0.62 INJECTION, SOLUTION INTRAMUSCULAR; INTRAVENOUS ONCE AS NEEDED
Status: DISCONTINUED | OUTPATIENT
Start: 2024-08-14 | End: 2024-08-14 | Stop reason: HOSPADM

## 2024-08-14 RX ORDER — OXYCODONE HYDROCHLORIDE 5 MG/1
5 TABLET ORAL EVERY 6 HOURS PRN
Qty: 30 TABLET | Refills: 0 | Status: SHIPPED | OUTPATIENT
Start: 2024-08-14

## 2024-08-14 RX ORDER — PHENYLEPHRINE HCL IN 0.9% NACL 1 MG/10 ML
SYRINGE (ML) INTRAVENOUS AS NEEDED
Status: DISCONTINUED | OUTPATIENT
Start: 2024-08-14 | End: 2024-08-14 | Stop reason: SURG

## 2024-08-14 RX ORDER — LIDOCAINE HYDROCHLORIDE 10 MG/ML
INJECTION, SOLUTION EPIDURAL; INFILTRATION; INTRACAUDAL; PERINEURAL AS NEEDED
Status: DISCONTINUED | OUTPATIENT
Start: 2024-08-14 | End: 2024-08-14 | Stop reason: SURG

## 2024-08-14 RX ORDER — SODIUM CHLORIDE 0.9 % (FLUSH) 0.9 %
10 SYRINGE (ML) INJECTION EVERY 12 HOURS SCHEDULED
Status: DISCONTINUED | OUTPATIENT
Start: 2024-08-14 | End: 2024-08-14 | Stop reason: HOSPADM

## 2024-08-14 RX ORDER — MIDAZOLAM HYDROCHLORIDE 1 MG/ML
INJECTION INTRAMUSCULAR; INTRAVENOUS AS NEEDED
Status: DISCONTINUED | OUTPATIENT
Start: 2024-08-14 | End: 2024-08-14 | Stop reason: SURG

## 2024-08-14 RX ORDER — ALBUTEROL SULFATE 2.5 MG/3ML
2.5 SOLUTION RESPIRATORY (INHALATION) EVERY 6 HOURS PRN
Status: DISCONTINUED | OUTPATIENT
Start: 2024-08-14 | End: 2024-08-15 | Stop reason: HOSPADM

## 2024-08-14 RX ORDER — OXYBUTYNIN CHLORIDE 10 MG/1
10 TABLET, EXTENDED RELEASE ORAL DAILY
Status: DISCONTINUED | OUTPATIENT
Start: 2024-08-15 | End: 2024-08-15 | Stop reason: HOSPADM

## 2024-08-14 RX ORDER — PROPOFOL 10 MG/ML
VIAL (ML) INTRAVENOUS AS NEEDED
Status: DISCONTINUED | OUTPATIENT
Start: 2024-08-14 | End: 2024-08-14 | Stop reason: SURG

## 2024-08-14 RX ORDER — FAMOTIDINE 10 MG/ML
20 INJECTION, SOLUTION INTRAVENOUS ONCE
Status: CANCELLED | OUTPATIENT
Start: 2024-08-14 | End: 2024-08-14

## 2024-08-14 RX ORDER — BUPIVACAINE HYDROCHLORIDE 5 MG/ML
INJECTION, SOLUTION PERINEURAL
Status: COMPLETED | OUTPATIENT
Start: 2024-08-14 | End: 2024-08-14

## 2024-08-14 RX ORDER — PROPOFOL 10 MG/ML
VIAL (ML) INTRAVENOUS AS NEEDED
Status: DISCONTINUED | OUTPATIENT
Start: 2024-08-14 | End: 2024-08-14

## 2024-08-14 RX ORDER — SODIUM CHLORIDE 0.9 % (FLUSH) 0.9 %
10 SYRINGE (ML) INJECTION AS NEEDED
Status: CANCELLED | OUTPATIENT
Start: 2024-08-14

## 2024-08-14 RX ORDER — SODIUM CHLORIDE AND POTASSIUM CHLORIDE 150; 900 MG/100ML; MG/100ML
50 INJECTION, SOLUTION INTRAVENOUS CONTINUOUS
Status: DISCONTINUED | OUTPATIENT
Start: 2024-08-14 | End: 2024-08-15 | Stop reason: HOSPADM

## 2024-08-14 RX ORDER — LABETALOL HYDROCHLORIDE 5 MG/ML
5 INJECTION, SOLUTION INTRAVENOUS
Status: DISCONTINUED | OUTPATIENT
Start: 2024-08-14 | End: 2024-08-14 | Stop reason: HOSPADM

## 2024-08-14 RX ORDER — ONDANSETRON 4 MG/1
4 TABLET, FILM COATED ORAL EVERY 8 HOURS PRN
Qty: 15 TABLET | Refills: 1 | Status: SHIPPED | OUTPATIENT
Start: 2024-08-14

## 2024-08-14 RX ORDER — ONDANSETRON 2 MG/ML
INJECTION INTRAMUSCULAR; INTRAVENOUS AS NEEDED
Status: DISCONTINUED | OUTPATIENT
Start: 2024-08-14 | End: 2024-08-14 | Stop reason: SURG

## 2024-08-14 RX ORDER — SODIUM CHLORIDE 9 MG/ML
40 INJECTION, SOLUTION INTRAVENOUS AS NEEDED
Status: CANCELLED | OUTPATIENT
Start: 2024-08-14

## 2024-08-14 RX ORDER — SODIUM CHLORIDE 9 MG/ML
40 INJECTION, SOLUTION INTRAVENOUS AS NEEDED
Status: DISCONTINUED | OUTPATIENT
Start: 2024-08-14 | End: 2024-08-14 | Stop reason: HOSPADM

## 2024-08-14 RX ORDER — DOCUSATE SODIUM 100 MG/1
100 CAPSULE, LIQUID FILLED ORAL 2 TIMES DAILY PRN
Status: DISCONTINUED | OUTPATIENT
Start: 2024-08-14 | End: 2024-08-15 | Stop reason: HOSPADM

## 2024-08-14 RX ORDER — HYDRALAZINE HYDROCHLORIDE 20 MG/ML
5 INJECTION INTRAMUSCULAR; INTRAVENOUS
Status: DISCONTINUED | OUTPATIENT
Start: 2024-08-14 | End: 2024-08-14 | Stop reason: HOSPADM

## 2024-08-14 RX ORDER — ONDANSETRON 2 MG/ML
4 INJECTION INTRAMUSCULAR; INTRAVENOUS ONCE AS NEEDED
Status: DISCONTINUED | OUTPATIENT
Start: 2024-08-14 | End: 2024-08-14 | Stop reason: HOSPADM

## 2024-08-14 RX ORDER — MAGNESIUM HYDROXIDE 1200 MG/15ML
LIQUID ORAL AS NEEDED
Status: DISCONTINUED | OUTPATIENT
Start: 2024-08-14 | End: 2024-08-14 | Stop reason: HOSPADM

## 2024-08-14 RX ORDER — HYDROCODONE BITARTRATE AND ACETAMINOPHEN 5; 325 MG/1; MG/1
1 TABLET ORAL ONCE AS NEEDED
Status: DISCONTINUED | OUTPATIENT
Start: 2024-08-14 | End: 2024-08-14 | Stop reason: HOSPADM

## 2024-08-14 RX ORDER — ONDANSETRON 2 MG/ML
4 INJECTION INTRAMUSCULAR; INTRAVENOUS EVERY 6 HOURS PRN
Status: DISCONTINUED | OUTPATIENT
Start: 2024-08-14 | End: 2024-08-14 | Stop reason: SDUPTHER

## 2024-08-14 RX ORDER — VANCOMYCIN/0.9 % SOD CHLORIDE 1.5G/250ML
15 PLASTIC BAG, INJECTION (ML) INTRAVENOUS EVERY 8 HOURS
Status: COMPLETED | OUTPATIENT
Start: 2024-08-14 | End: 2024-08-15

## 2024-08-14 RX ORDER — LIDOCAINE HYDROCHLORIDE 10 MG/ML
0.5 INJECTION, SOLUTION EPIDURAL; INFILTRATION; INTRACAUDAL; PERINEURAL ONCE AS NEEDED
Status: COMPLETED | OUTPATIENT
Start: 2024-08-14 | End: 2024-08-14

## 2024-08-14 RX ORDER — SENNOSIDES 8.6 MG
650 CAPSULE ORAL EVERY 8 HOURS
Qty: 90 TABLET | Refills: 0 | Status: SHIPPED | OUTPATIENT
Start: 2024-08-14

## 2024-08-14 RX ORDER — DROPERIDOL 2.5 MG/ML
0.62 INJECTION, SOLUTION INTRAMUSCULAR; INTRAVENOUS
Status: DISCONTINUED | OUTPATIENT
Start: 2024-08-14 | End: 2024-08-14 | Stop reason: HOSPADM

## 2024-08-14 RX ORDER — AMLODIPINE BESYLATE 5 MG/1
5 TABLET ORAL DAILY
Status: DISCONTINUED | OUTPATIENT
Start: 2024-08-15 | End: 2024-08-15 | Stop reason: HOSPADM

## 2024-08-14 RX ORDER — DOCUSATE SODIUM 100 MG/1
100 CAPSULE, LIQUID FILLED ORAL 2 TIMES DAILY PRN
Qty: 60 CAPSULE | Refills: 0 | Status: SHIPPED | OUTPATIENT
Start: 2024-08-14

## 2024-08-14 RX ORDER — BUDESONIDE AND FORMOTEROL FUMARATE DIHYDRATE 160; 4.5 UG/1; UG/1
2 AEROSOL RESPIRATORY (INHALATION)
Status: DISCONTINUED | OUTPATIENT
Start: 2024-08-14 | End: 2024-08-15 | Stop reason: HOSPADM

## 2024-08-14 RX ORDER — OXYCODONE HYDROCHLORIDE 5 MG/1
5 TABLET ORAL EVERY 4 HOURS PRN
Status: DISCONTINUED | OUTPATIENT
Start: 2024-08-14 | End: 2024-08-15 | Stop reason: HOSPADM

## 2024-08-14 RX ORDER — ATORVASTATIN CALCIUM 40 MG/1
40 TABLET, FILM COATED ORAL NIGHTLY
Status: DISCONTINUED | OUTPATIENT
Start: 2024-08-14 | End: 2024-08-15 | Stop reason: HOSPADM

## 2024-08-14 RX ORDER — MIDAZOLAM HYDROCHLORIDE 1 MG/ML
1 INJECTION INTRAMUSCULAR; INTRAVENOUS
Status: DISCONTINUED | OUTPATIENT
Start: 2024-08-14 | End: 2024-08-14 | Stop reason: HOSPADM

## 2024-08-14 RX ORDER — NALOXONE HCL 0.4 MG/ML
0.4 VIAL (ML) INJECTION
Status: DISCONTINUED | OUTPATIENT
Start: 2024-08-14 | End: 2024-08-15 | Stop reason: HOSPADM

## 2024-08-14 RX ORDER — SODIUM CHLORIDE 0.9 % (FLUSH) 0.9 %
3 SYRINGE (ML) INJECTION EVERY 12 HOURS SCHEDULED
Status: DISCONTINUED | OUTPATIENT
Start: 2024-08-14 | End: 2024-08-15 | Stop reason: HOSPADM

## 2024-08-14 RX ORDER — ACETAMINOPHEN 500 MG
1000 TABLET ORAL ONCE
Status: COMPLETED | OUTPATIENT
Start: 2024-08-14 | End: 2024-08-14

## 2024-08-14 RX ORDER — MELOXICAM 15 MG/1
15 TABLET ORAL DAILY
Qty: 30 TABLET | Refills: 0 | Status: SHIPPED | OUTPATIENT
Start: 2024-08-14

## 2024-08-14 RX ORDER — ASPIRIN 81 MG/1
81 TABLET ORAL EVERY 12 HOURS SCHEDULED
Status: DISCONTINUED | OUTPATIENT
Start: 2024-08-15 | End: 2024-08-15 | Stop reason: HOSPADM

## 2024-08-14 RX ORDER — ONDANSETRON 4 MG/1
4 TABLET, ORALLY DISINTEGRATING ORAL EVERY 6 HOURS PRN
Status: DISCONTINUED | OUTPATIENT
Start: 2024-08-14 | End: 2024-08-15 | Stop reason: HOSPADM

## 2024-08-14 RX ORDER — ONDANSETRON 2 MG/ML
4 INJECTION INTRAMUSCULAR; INTRAVENOUS EVERY 6 HOURS PRN
Status: DISCONTINUED | OUTPATIENT
Start: 2024-08-14 | End: 2024-08-15 | Stop reason: HOSPADM

## 2024-08-14 RX ORDER — FENTANYL CITRATE 50 UG/ML
50 INJECTION, SOLUTION INTRAMUSCULAR; INTRAVENOUS
Status: DISCONTINUED | OUTPATIENT
Start: 2024-08-14 | End: 2024-08-14 | Stop reason: HOSPADM

## 2024-08-14 RX ORDER — SODIUM CHLORIDE 0.9 % (FLUSH) 0.9 %
3-10 SYRINGE (ML) INJECTION AS NEEDED
Status: DISCONTINUED | OUTPATIENT
Start: 2024-08-14 | End: 2024-08-14 | Stop reason: HOSPADM

## 2024-08-14 RX ORDER — LABETALOL HYDROCHLORIDE 5 MG/ML
10 INJECTION, SOLUTION INTRAVENOUS EVERY 4 HOURS PRN
Status: DISCONTINUED | OUTPATIENT
Start: 2024-08-14 | End: 2024-08-15 | Stop reason: HOSPADM

## 2024-08-14 RX ADMIN — METOPROLOL TARTRATE 25 MG: 25 TABLET, FILM COATED ORAL at 21:18

## 2024-08-14 RX ADMIN — Medication 3 ML: at 21:55

## 2024-08-14 RX ADMIN — Medication 50 MCG: at 15:25

## 2024-08-14 RX ADMIN — TAMSULOSIN HYDROCHLORIDE 0.4 MG: 0.4 CAPSULE ORAL at 21:18

## 2024-08-14 RX ADMIN — DEXAMETHASONE SODIUM PHOSPHATE 4 MG: 4 INJECTION INTRA-ARTICULAR; INTRALESIONAL; INTRAMUSCULAR; INTRAVENOUS; SOFT TISSUE at 14:17

## 2024-08-14 RX ADMIN — TRANEXAMIC ACID 1000 MG: 10 INJECTION, SOLUTION INTRAVENOUS at 15:44

## 2024-08-14 RX ADMIN — SODIUM CHLORIDE, POTASSIUM CHLORIDE, SODIUM LACTATE AND CALCIUM CHLORIDE 9 ML/HR: 600; 310; 30; 20 INJECTION, SOLUTION INTRAVENOUS at 12:15

## 2024-08-14 RX ADMIN — LIDOCAINE HYDROCHLORIDE 50 MG: 10 INJECTION, SOLUTION EPIDURAL; INFILTRATION; INTRACAUDAL; PERINEURAL at 14:02

## 2024-08-14 RX ADMIN — FAMOTIDINE 20 MG: 20 TABLET, FILM COATED ORAL at 21:17

## 2024-08-14 RX ADMIN — MONTELUKAST 10 MG: 10 TABLET, FILM COATED ORAL at 21:18

## 2024-08-14 RX ADMIN — ATORVASTATIN CALCIUM 40 MG: 40 TABLET, FILM COATED ORAL at 21:18

## 2024-08-14 RX ADMIN — ACETAMINOPHEN 1000 MG: 500 TABLET ORAL at 12:15

## 2024-08-14 RX ADMIN — PROPOFOL 100 MCG/KG/MIN: 10 INJECTION, EMULSION INTRAVENOUS at 14:08

## 2024-08-14 RX ADMIN — Medication 1500 MG: at 12:15

## 2024-08-14 RX ADMIN — TRANEXAMIC ACID 1000 MG: 10 INJECTION, SOLUTION INTRAVENOUS at 14:19

## 2024-08-14 RX ADMIN — ALPRAZOLAM 0.5 MG: 0.5 TABLET ORAL at 22:35

## 2024-08-14 RX ADMIN — OXYCODONE HYDROCHLORIDE 5 MG: 5 TABLET ORAL at 21:55

## 2024-08-14 RX ADMIN — ONDANSETRON 4 MG: 2 INJECTION INTRAMUSCULAR; INTRAVENOUS at 14:17

## 2024-08-14 RX ADMIN — PROPOFOL 30 MG: 10 INJECTION, EMULSION INTRAVENOUS at 14:02

## 2024-08-14 RX ADMIN — POTASSIUM CHLORIDE AND SODIUM CHLORIDE 50 ML/HR: 900; 150 INJECTION, SOLUTION INTRAVENOUS at 22:59

## 2024-08-14 RX ADMIN — LIDOCAINE HYDROCHLORIDE 0.5 ML: 10 INJECTION, SOLUTION EPIDURAL; INFILTRATION; INTRACAUDAL; PERINEURAL at 12:15

## 2024-08-14 RX ADMIN — BUPIVACAINE HYDROCHLORIDE 2.2 ML: 5 INJECTION, SOLUTION PERINEURAL at 14:02

## 2024-08-14 RX ADMIN — MIDAZOLAM HYDROCHLORIDE 2 MG: 1 INJECTION, SOLUTION INTRAMUSCULAR; INTRAVENOUS at 13:30

## 2024-08-14 RX ADMIN — Medication 10 MG: at 22:35

## 2024-08-14 RX ADMIN — ACETAMINOPHEN 1000 MG: 500 TABLET ORAL at 21:55

## 2024-08-14 RX ADMIN — BUDESONIDE AND FORMOTEROL FUMARATE DIHYDRATE 2 PUFF: 160; 4.5 AEROSOL RESPIRATORY (INHALATION) at 20:39

## 2024-08-14 RX ADMIN — Medication 1500 MG: at 21:19

## 2024-08-14 RX ADMIN — PROPOFOL 30 MG: 10 INJECTION, EMULSION INTRAVENOUS at 14:09

## 2024-08-14 RX ADMIN — OXYCODONE HYDROCHLORIDE 10 MG: 10 TABLET, FILM COATED, EXTENDED RELEASE ORAL at 12:15

## 2024-08-14 NOTE — OP NOTE
Orthopaedics Operative Report    PREOPERATIVE DIAGNOSIS: Primary osteoarthritis left knee    POSTOPERATIVE DIAGNOSIS: Same    PROCEDURE PERFORMED: Left total knee arthroplasty using Yuly robot, CPT 19083, 63970    SURGEON: Rolando Mosqueda MD    ANESTHESIA:  Spinal with Block    STAFF:  Circulator: Rosa Elena Jordan RN; Wanda Collins RN  Scrub Person: Mode Rangel; Columba Martinez  Vendor Representative: Saulo Dyer  Nursing Assistant: Anders Garay  Assistant: Joyce Lentz PA-C    TOURNIQUET TIME: 76 minutes    ESTIMATED BLOOD LOSS: 50 mL    COMPLICATIONS: None apparent.    RELEASES: None required after approach, bone cuts made, and osteophytes removed    Surgical Approach: Knee Medial Parapatellar     TXA: IV    IMPLANTS:     Implant Name Type Inv. Item Serial No.  Lot No. LRB No. Used Action   DEV CONTRL TISS STRATAFIX SYMM PDS PLUS ARTHUR CT-1 45CM - [id number] Implant DEV CONTRL TISS STRATAFIX SYMM PDS PLUS ARTHUR CT-1 45CM  [employer] Left 1 Implanted   BASEPLT TIB/KN OSSEOTI PERSONA KEEL CMTLS 0DEG SZF LT - [id number] Implant BASEPLT TIB/KN OSSEOTI PERSONA KEEL CMTLS 0DEG SZF LT  [employer] [id number] Left 1 Implanted   COMP FEM/KN PERSONA CR POR COCR STD SZ8 LT - [id number] Implant COMP FEM/KN PERSONA CR POR COCR STD SZ8 LT  [employer] [id number] Left 1 Implanted   PAT NXGN POR [id number] - [id number] Implant PAT NXGN POR [id number]  [employer] [id number] Left 1 Implanted   ART/SRF KN PERSONA/VE PS EF 8TO11 10MM LT - [id number] Implant ART/SRF KN PERSONA/VE PS EF 8TO11 10MM LT  [employer] [id number] Left 1 Implanted       Nael Persona CR TM femur size 8 standard  size F 0 degree [place] tibia  32 TM patella button   Size 10 Vitamin E Medial Congruent highly crosslinked polyethylene articular surface    PREOPERATIVE ANTIBIOTICS: Vancomycin    REFERRING PHYSICIAN: Bret Rebolledo MD    INDICATIONS: Failure of nonoperative treatment including injections,  bracing, and activity modification.    DESCRIPTION OF PROCEDURE: After informed consent was obtained, the patient was taken to the operating room. The patient was given a dose of IV antibiotics prior to incision.After the smooth induction of spinal anesthesia, the patient’s left lower extremity was prepped and draped in the usual fashion for this type of procedure. We performed a timeout to verify site and the procedure to be performed.  We began with exsanguination of the left lower extremity using an Esmarch bandage and inflation of tourniquet to 300 mmHg. We made our standard midline incision and medial parapatellar approach. We took 20% of the quadriceps tendon medially and a sleeve of tissue around the patella for repair as well a sliver of patellar tendon. Dissected extra-ostially but subperiosteally on the medial side taking off the superficial and deep MCL from the proximal tibia. These were protected throughout the procedure. Visible medial meniscus was excised. The retropatellar fat pad was excised. The ACL and visible lateral meniscus was excised as well as the synovium from the anterior surface of the distal femur.  Osteophytes were removed from the distal femur and proximal tibia at this point.            We then turned our attention to placement of our pins for the femoral and tibial trackers.  The femoral trackers were placed within the incision about 2 to 3 fingerbreadths from the articular cartilage on the medial aspect of the femur.  The tibial trackers were placed through percutaneous incisions on the tibia.  Once these were placed, we obtained our hip center and then we then registered our data points on the femur and tibia.  Once these were registered, we took the knee through a range of motion and assessed our medial and lateral gaps at 0, 45, and 90 degrees. The 90 degree gap was obtained using a Moon elevator.  At this point, we then created our surgical plan.   Extension gaps medially and  laterally were 20.5 and 23 mm.  Flexion gaps medially and laterally were 20 and 22.0 mm.  Patient had a preoperative 10 degree dynamic varus deformity.  We were able to correct them to 4 degrees of varus alignment.  Preop range of motion was 1.5 to 126.  We placed 2 degrees of varus alignment on the distal femoral cut and 1 degrees of varus on the proximal tibial cut.  Distal femoral cut was made at 11.5 mm. Proximal tibial cut was made at 10 mm.        At this point the robotic arm was brought in for the distal femoral cut.  This was made in collaborative mode and then validated.  We then sized the femur to verify the size of the femoral component to be appropriate.  We then brought the robotic arm back into pin our external rotation.  Our 4-in-1 block was then placed and we assessed for possible notching as well as for the size of the posterior condylar cuts.  The 4-in-1 cut was made without difficulty and the excess bone removed.  We then brought the robotic arm back in to make our proximal tibia cut.  Retractors were placed and the bone cut was made again in collaborative mode which was validated both for amount of bone taken off and for slope.  We then used our blocks and checked our extension and flexion gaps which were felt to be acceptable.  We then took off posterior osteophytes off the posterior medial posterior lateral femur.  We completed preparation of our tibia and femur and then trialed and a size 10 seemed to have the best stability and range of motion parameters.  We then everted the patella and this was resurfaced, restoring patellar height before trials were placed to protect the bone. The patellar height was 25 mm preoperatively and we cut the patella to 15 mm and sized the patella to a 32.  The lughole for the press-fit implant was eventually drilled and the component slightly medialized. The bone was then thoroughly irrigated and felt to be appropriate for press-fit implants.      We then  injected our periarticular injection into the posterior medial aspect of the knee which consisted of 20 ml of NS, 20 ml of 0.5% lidocaine with epi, 20 ml of 0.5% bupivicaine, 30 mg of toradol, and 8 mg of morphine. We implanted these press-fit implants in place and had excellent purchase.  We then deflated the tourniquet prior to placement of our final polyethylene spacer. Any bleeding seen in the back of the knee was controlled and we obtained hemostasis. The final spacer was then secured into place. After the implants were placed and a size 10 polyethylene implant was placed, the final range of motion was -3.5 to 135 degrees. We then used a final irrigation consisting of Irricept and diluted Betadine throughout the knee.  Trackers and pins were then removed.  We then closed our medial parapatellar approach using 0 Ethibond in an interrupted fashion.  We then closed our subcutaneous layer using running 2-0 Vicryl and interrupted 2-0 Vicryl. We closed our skin using a running 3-0 Monocryl. We placed an Exofin Fusion dressing system over the incision and took down the drapes. The patient was transferred back to their hospital bed and then taken to the recovery room in stable condition. All counts were correct postoperatively. I performed the case.      First assistant: Joyce Lentz PA-C    The skilled assistance of the above noted first assistant was necessary during this complex surgical procedure.  The surgical assistant assisted with every aspect of the operation including, but not limited to, proper and safe positioning of the patient, obtaining adequate surgical exposure, manipulation of surgical instruments to make the proper bone cuts, cementing of the final implants, the continual process of hemostasis during the procedure itself in addition to surgical wound closure and removal of the patient from the operating table and returning the patient back to the [facility].  The assistance of the  surgical assistant allowed me to perform the most sensitive and technical potions of this operation using 2 hands, thus enhancing efficiency and patient safety.  This would not be possible without the help of a skilled assistant familiar with the procedure and capable of safely performing the aforementioned tasks.       POSTOPERATIVE PLAN:  1. Weight bearing as tolerated left lower extremity.  2. The patient will receive an indwelling low femoral nerve block in the recovery room.  3.  Patient will receive IV antibiotics on the floor for 24 hours  4.  Patient will be given baby aspirin twice a day for DVT prophylaxis starting tomorrow morning and continuing for 6 weeks.  5.  The patient will begin physical therapy  6.  Will be under the care of the hospitalist service and Dr. BLACKWOOD  7.  Patient will be discharged home with a prescription for oxycodone, Mobic, Tylenol, Colace, and Zofran in addition to their DVT prophylaxis  8.  Follow up with me in the office in 3 weeks    Rolando Mosqueda M.D.*

## 2024-08-14 NOTE — ANESTHESIA PREPROCEDURE EVALUATION
Anesthesia Evaluation     Patient summary reviewed and Nursing notes reviewed   NPO Solid Status: > 8 hours  NPO Liquid Status: > 8 hours           Airway   Mallampati: II  TM distance: >3 FB  Neck ROM: full  No difficulty expected  Dental - normal exam     Pulmonary     breath sounds clear to auscultation  (+) asthma,  Cardiovascular   Exercise tolerance: good (4-7 METS)    Rhythm: regular  Rate: normal        Neuro/Psych  GI/Hepatic/Renal/Endo      Musculoskeletal     Abdominal    Substance History      OB/GYN          Other                          Anesthesia Plan    ASA 3     spinal     (Adductor canal block in pacu for post op p[ain control)    Anesthetic plan, risks, benefits, and alternatives have been provided, discussed and informed consent has been obtained with: patient.        CODE STATUS:

## 2024-08-14 NOTE — OUTREACH NOTE
TOTAL JOINT REPLACEMENT CARE NAVIGATION INITIAL ASSESSMENT    PATIENT INFORMATION:     Patient: Patricio Iverson       YOB: 1965         Age/Gender: 58 y.o. male     Medical Record Number: [id number]     Surgery:  LT TKA                    Surgery Date: 08/14/24    Surgeon: DR. ASH HURT    Physical Therapy Location: [facility] Date & Time: 08/16 @ 11:30    DVT PPX: ASPIRIN 81 MG BID      LIVING SITUATION:     [x]Private Residence      Who lives in the home?    S/O JAROD                      []Nursing Home:                                     []Assisted Living  []Rehabilitation Facility:                          []Live Alone  []Homeless    HOUSING STYLE:     [x]Ranch Style   []Multi-level   []Split level   []Townhouse   []Apartment    Do you have steps to navigate in the home? NO  Do you have steps to navigate outside the home? NO      ADL:     [x]Independent   []Independent with difficulty   []Partially Dependent   []Dependent    Do you require bathing/showering? NO  Do you require assistance with grooming (brushing hair, shaving, etc)? NO  Do you require assistance dressing? NO  Do you require assistance with walking? NO  Do you require assistive devices while walking (cane, walker, wheelchair)? NO  Do you require assistance with transfers (moving from bed to chair, wheelchair, etc)? NO  Do you require assistance preparing meals? NO  Do you require assistance when eating meals? NO  Do you require assistance to use the toilet? NO  Do you have any issues with bowel or bladder incontinence? NO  Do you require assistance with household chores (cleaning, laundry, etc)? NO  Have you had any recent falls? NO    CURRENT SERVICES:    []Home Health (PT, OT, Skilled Nursing)  Agency:    County:  []Palliative Care  []Meals on Wheels  []Daily Caregiver  [x]None    CURRENT DME:    [x]Walker   []Cane   []Wheelchair   []Crutches   []Bedside Commode   []Shower  Chair  []Hospital Bed   []Nebulizer   []Oxygen  []Other:    MEDICATIONS:    Are you currently on any blood thinners? NO  Are you currently on any anti-inflammatory medications? MELOXICAM - STOPPED  Are you currently on any medications for rheumatoid arthritis? NO  Are you currently taking any herbal supplements? TUMERIC - STOPPED      Post-op Pharmacy Name:   [facility] [place]              Phone Number: [phone]    Does anyone assist you filling medication boxes or remind you that medication is due? NO  Are you currently on a mail order prescription plan? NO  What pharmacy do you use to fill your short-term prescriptions? [facility]  Do you have prescription insurance coverage? YES  Can you afford your medications/co-pays? YES    CURRENT TRANSPORTATION:    Which services do you rely on? []Taxi   []Public Transportation   [x]Private Vehicle     []Ambulance   []Tack ([facility])    Do you have a way to get home when you are discharged?     POTENTIAL NEEDS:  NONE    []Rehabilitation   []Home Health PT   []SNF  []Meals on Wheels   []Department of    []Palliative Care  []Nursing Home   []Hospice   []Durable Medical Equipment  []Caregiver Services   []Financial Services   []Pre-op In Home PT Evaluation    CLEARANCES NEEDED FOR SURGERY: OBTAINED AND SCANNED    [x]Dental   []Cardiology   [x]Pulmonology   []Medical (PCP)   []Rheumatology  []Endocrinology   []Hematology/Oncology   []Neurology   []Neurosurgery   []CT Vascular      INITIAL DISCHARGE PLAN:  23 HOUR STAY, D/C HOME WITH ASSISTANCE FROM S/O JAROD. EDUCATED ON ALL PROPER DME NEEDED. PHYSICAL THERAPY WILL BEGIN WITH [facility] IN [place] ON 08/16 @ 11:30. PATIENT WILL BEGIN TAKING ASPIRIN 81 MG BID AFTER SURGERY FOR DVT PROPHYLAXIS.

## 2024-08-14 NOTE — H&P
Patient Name: Patricio Iverson  MRN: [id number]  : 1965  DOS: 2024    Attending: Rolando Mosqueda,*    Primary Care Provider: Sarbjit Rebolledo MD      Chief complaint:  Left knee pain    Subjective   Patient is a pleasant 58 y.o. male presented for scheduled surgery by Dr. Mosqueda.  He anticipates left total knee replacement today.  His knee has been painful for several years.  He has been using a brace and doing he has tried numerous cortisone injections with short-term pain relief.    He is known to us from previous admission 3/9/19 right hip replacement; which he recovered well.    When seen preop he is doing well.  He denies pain or other complaints.  He denies nausea, shortness of breath or chest pain.  No history of DVT or PE.    Allergies:  Allergies   Allergen Reactions    Keflex [Cephalexin] Hives       Meds:  Medications Prior to Admission   Medication Sig Dispense Refill Last Dose    albuterol sulfate  (90 Base) MCG/ACT inhaler Inhale 2 puffs Every 6 (Six) Hours As Needed.   2024 at 0700    ALLERGY SERUM INJECTION Inject  under the skin into the appropriate area as directed 1 (One) Time.   Past Week    ALPRAZolam (XANAX) 0.5 MG tablet Take 1 tablet by mouth At Night As Needed.  2 2024    amLODIPine (NORVASC) 10 MG tablet Take 0.5 tablets by mouth Daily.  3 2024 at 0800    Ascorbic Acid (Vitamin C) 500 MG capsule Take 1 Capful by mouth Daily.   Past Week    atorvastatin (LIPITOR) 40 MG tablet Take 1 tablet by mouth Every Night.  3 2024    Dulera 200-5 MCG/ACT inhaler Inhale 2 puffs 2 (Two) Times a Day.   2024 at 0800    famotidine (PEPCID) 20 MG tablet Take 1 tablet by mouth 2 (Two) Times a Day.   2024 at 0800    fenofibrate 160 MG tablet Take 1 tablet by mouth Every Night.  3 2024    Gemtesa 75 MG tablet Take 1 tablet by mouth Daily.   2024 at 0800    Ginkgo Biloba 60 MG capsule Daily.   Past Month    HYDROcodone-acetaminophen  (NORCO)  MG per tablet Take 1 tablet by mouth Every 6 (Six) Hours As Needed. for pain   8/14/2024 at 0800    levocetirizine (XYZAL) 5 MG tablet Take 1 tablet by mouth Daily.   8/14/2024 at 0800    Melatonin 10 MG capsule Take 2 capsules by mouth Every Night.   8/13/2024    metoprolol tartrate (LOPRESSOR) 25 MG tablet Take 1 tablet by mouth Every 12 (Twelve) Hours.   8/14/2024 at 0800    montelukast (SINGULAIR) 10 MG tablet Take 1 tablet by mouth Every Night.  5 8/13/2024    Multiple Vitamins-Minerals (MULTIVITAMIN ADULT PO) Take 1 tablet by mouth Daily.   8/7/2024    Pediatric Multivitamins-Iron (multivitamin chewable) chewable tablet Chew 1 tablet Daily.   8/7/2024    azelastine (ASTELIN) 0.1 % nasal spray As Needed.   More than a month    Breo Ellipta 100-25 MCG/ACT aerosol powder  Inhale 1 puff Daily.   Unknown    butalbital-acetaminophen-caffeine (ORBIVAN) -40 MG capsule capsule Take 1 capsule by mouth As Needed.   More than a month    desloratadine (CLARINEX) 5 MG tablet Take 1 tablet by mouth Daily.  5 More than a month    fluticasone (FLONASE) 50 MCG/ACT nasal spray into the nostril(s) as directed by provider Daily.   More than a month    ipratropium (ATROVENT) 0.06 % nasal spray 1 spray into the nostril(s) as directed by provider 2 (Two) Times a Day As Needed for Rhinitis.   More than a month    loratadine (CLARITIN) 10 MG tablet Take 1 tablet by mouth Daily.   More than a month    Omega-3 Fatty Acids (FISH OIL) 1200 MG capsule delayed-release Take 1 capsule by mouth 2 (Two) Times a Day. 1290   8/7/2024    sildenafil (REVATIO) 20 MG tablet TAKE UP TO 3 TABLETS BY MOUTH EVERY DAY AS NEEDED  5     tamsulosin (FLOMAX) 0.4 MG capsule 24 hr capsule Take 1 capsule by mouth Every Night.   More than a month    Testosterone Cypionate (DEPOTESTOTERONE CYPIONATE) 200 MG/ML injection INJECT 1 ML IM EVERY 14 DAYS-  1 8/7/2024    TURMERIC PO Take  by mouth Daily.   8/7/2024    Zinc 50 MG capsule Daily.    "8/7/2024         History:   Past Medical History:   Diagnosis Date    Abdominal hernia     Anesthesia complication     \"hard to put to sleep\" duroing bronch    Arthritis     GERD (gastroesophageal reflux disease)     Headache     [place] (hard of hearing)     Hyperlipidemia     Hypertension     Hypertriglyceridemia     Migraine     Seasonal allergies     Sleep apnea with use of continuous positive airway pressure (CPAP)     compliant with machine     SOBOE (shortness of breath on exertion)     Wears glasses     Wears partial dentures     \"dental implant\"  tops and most bottom - crowns and implants  non-removable     Past Surgical History:   Procedure Laterality Date    COLONOSCOPY      DENTAL PROCEDURE  08/2018    Dental Implants    ENDOSCOPY      EPIDURAL      injection in shoulder     HERNIA REPAIR      umbilical hernia on left side     KNEE SURGERY Right 2010    scope    SHOULDER SURGERY Right 2010    labrium and rotator cuff- couple times     TONSILLECTOMY      TOTAL HIP ARTHROPLASTY Right 03/19/2019    Procedure: TOTAL RIGHT HIP ARTHROPLASTY;  Surgeon: Anil Graham MD;  Location: [facility];  Service: Orthopedics    WISDOM TOOTH EXTRACTION      all 4 removed     WRIST SURGERY Right     Carpal Tunnel     Family History   Problem Relation Age of Onset    No Known Problems Mother     Cancer Father      Social History     Tobacco Use    Smoking status: Never    Smokeless tobacco: Never   Vaping Use    Vaping status: Never Used   Substance Use Topics    Alcohol use: Yes     Alcohol/week: 14.0 standard drinks of alcohol     Types: 7 Cans of beer, 7 Shots of liquor per week    Drug use: No   He lives with his significant other.  He has 2 children.  He works on a farm.    Review of Systems  All systems were reviewed and negative except for:  Respiratory: positive for  shortness of air    Vital Signs  /93 (BP Location: Right arm, Patient Position: Lying)   Pulse 61   Temp 98.5 °F (36.9 °C) (Temporal)   Resp 18  "  SpO2 96%     Physical Exam:    General Appearance:    Alert, cooperative, in no acute distress   Head:    Normocephalic, without obvious abnormality, atraumatic   Eyes:            Lids and lashes normal, conjunctivae and sclerae normal, no   icterus, no pallor, corneas clear,    Ears:    Ears appear intact with no abnormalities noted   Throat:   No oral lesions, no thrush, oral mucosa moist   Neck:   No adenopathy, supple, trachea midline, no thyromegaly    Lungs:     Clear to auscultation,respirations regular, even and unlabored    Heart:    Regular rhythm and normal rate, normal S1 and S2   Abdomen:     Normal bowel sounds, no masses, no organomegaly, soft nontender, nondistended, no guarding, no rebound  tenderness   Genitalia:    Deferred   Extremities:   Moves all extremities well, no edema, no cyanosis, no  redness   Pulses:   Pulses palpable and equal bilaterally   Skin:   No bleeding, bruising or rash   Neurologic:   Cranial nerves 2 - 12 grossly intact. Flexion and dorsiflexion intact bilateral feet.        I reviewed the patient's new clinical results.     Lab Results   Component Value Date    HGBA1C 5.20 08/01/2024      Latest Reference Range & Units 08/01/24 09:44   Sodium 136 - 145 mmol/L 138   Potassium 3.5 - 5.2 mmol/L 4.0   Chloride 98 - 107 mmol/L 99   CO2 22.0 - 29.0 mmol/L 29.0   Anion Gap 5.0 - 15.0 mmol/L 10.0   BUN 6 - 20 mg/dL 13   Creatinine 0.76 - 1.27 mg/dL 0.94   BUN/Creatinine Ratio 7.0 - 25.0  13.8   eGFR >60.0 mL/min/1.73 94.6   Glucose 65 - 99 mg/dL 117 (H)   Calcium 8.6 - 10.5 mg/dL 9.6   Alkaline Phosphatase 39 - 117 U/L 86   Total Protein 6.0 - 8.5 g/dL 7.3   Albumin 3.5 - 5.2 g/dL 4.5   Globulin gm/dL 2.8   A/G Ratio g/dL 1.6   AST (SGOT) 1 - 40 U/L 31   ALT (SGPT) 1 - 41 U/L 62 (H)   Total Bilirubin 0.0 - 1.2 mg/dL 0.9   Hemoglobin A1C 4.80 - 5.60 % 5.20   C-Reactive Protein 0.00 - 0.50 mg/dL <0.30   WBC 3.40 - 10.80 10*3/mm3 7.03   RBC 4.14 - 5.80 10*6/mm3 4.98   Hemoglobin  13.0 - 17.7 g/dL 16.6   Hematocrit 37.5 - 51.0 % 48.8   Platelets 140 - 450 10*3/mm3 232   RDW 12.3 - 15.4 % 13.0   MCV 79.0 - 97.0 fL 98.0 (H)   MCH 26.6 - 33.0 pg 33.3 (H)   MCHC 31.5 - 35.7 g/dL 34.0   MPV 6.0 - 12.0 fL 8.8   (H): Data is abnormally high    Assessment and Plan:     OA (osteoarthritis) of knee    HTN (hypertension)    HLD (hyperlipidemia)    FAIZAN on CPAP    Chronic pain    Chronic narcotic use      Plan  1. PT/OT- WBAT LLE  2. Pain control-prns, AC nerve block  3. IS-encourage  4. DVT proph- Mechs/ASA  5. Bowel regimen  6. Resume home medications as appropriate  7. Monitor post-op labs  8. DC planning for home    HTN, Hyperlipidemia  - Continue home norvasc, statin, lopressor  - Monitor BP   - Holding parameters for BP meds  - Labetalol PRN for SBP>170    FAIZAN  - cpap at night      ALLYSON Souza  08/14/24  13:20 EDT

## 2024-08-14 NOTE — ANESTHESIA PROCEDURE NOTES
Left AC Cath      Patient reassessed immediately prior to procedure    Reason for block: at surgeon's request and post-op pain management  Performed by  CRNA/CAA: Eren Hein, CRNA  Assisted by: Wanda Crenshaw RN  Preanesthetic Checklist  Completed: patient identified, IV checked, site marked, risks and benefits discussed, surgical consent, monitors and equipment checked, pre-op evaluation and timeout performed  Prep:  Pt Position: supine  Sterile barriers:cap, gloves, mask, sterile barriers and washed/disinfected hands  Prep: ChloraPrep  Patient monitoring: blood pressure monitoring, continuous pulse oximetry and EKG  Procedure  Performed under: spinal  Guidance:ultrasound guided    ULTRASOUND INTERPRETATION.  Using ultrasound guidance a 20 G gauge needle was placed in close proximity to the nerve, at which point, under ultrasound guidance anesthetic was injected in the area of the nerve and spread of the anesthesia was seen on ultrasound in close proximity thereto.  There were no abnormalities seen on ultrasound; a digital image was taken; and the patient tolerated the procedure with no complications. Images:still images obtained, printed/placed on chart    Block Type:adductor canal block  Injection Technique:catheter  Needle Type:Tuohy and echogenic  Needle Gauge:18 G  Resistance on Injection: none  Catheter Size:20 G (20g)          Post Assessment  Injection Assessment: negative aspiration for heme, incremental injection and no paresthesia on injection  Patient Tolerance:comfortable throughout block  Complications:no  Additional Notes  CATHETER   A high-frequency linear transducer, with sterile cover, was placed on the anterior mid-thigh (between the anterior superior iliac spine and patella). The transducer was then moved medially to identify the Sartorius muscle (Brandon), Vastus Medialis muscle (VMM), Superficial Femoral Artery (SFA) and Vein. The transducer was then moved cephalad or caudad to position the  "SFA in the middle of the Brandon. The insertion site was prepped and draped in sterile fashion. Skin and cutaneous tissue was infiltrated with 2-5 ml of 1% Lidocaine. Using ultrasound-guidance, an 18-gauge Contiplex Ultra 360 Touhy needle was advanced in plane from lateral to medial. Preservative-free normal saline was utilized for hydro-dissection of tissue, advancement of Touhy, and to confirm needle placement below the fascial plane of the Brandon where the Nerve to the VMM is located. Local anesthetic (LA) 5 ml deposited here. The Touhy needle continues its path lateral to the SFA at the level of the Saphenous Nerve. The remainder of the LA was deposited at the 10-11 o'clock position of the SFA. This injection created a space between the Brandon and the SFA. Aspiration every 5 ml to prevent intravascular injection. Injection was completed with negative aspiration of blood and negative intravascular injection. Injection pressures were normal with minimal resistance. A 20-gauge Contiplex Echo catheter was placed through the needle and advance out the tip of the Touhy 3-5 cm anterior to the SFA. The Touhy needle was then removed, and final catheter position verified at the 12 o'clock position to the SFA. The catheter was secured in the usual fashion with skin glue, benzoin, steri-strips, CHG tegaderm and label noting \"Nerve Block Catheter\". Jerk tape applied at yellow connector and catheter connection.   Performed by: Christiano Vital CRNA            "

## 2024-08-14 NOTE — H&P
Pre-Op H&P  Patricio Iverson  [id number]  1965      Chief complaint: Left knee pain      Subjective:  Patient is a 58 y.o.male presents for scheduled surgery by Dr. Mosqueda. He anticipates a TOTAL KNEE ARTHROPLASTY WITH SHAKIR ROBOT LEFT - Left today.  The patient has had left knee pain for the past 4 to 5 years.  The pain is present only when he is bearing weight on the left side.  Up to this point, conservative treatment methods have failed to alleviate his pain.  The last dose of ginkgo was over 10 days ago.    Review of Systems:  Constitutional-- No fever, chills or sweats. No fatigue.  CV-- No chest pain, palpitation or syncope. +HTN, HLD.  Resp-- No SOB, cough, hemoptysis. +FAIZAN w/ CPAP use.  Skin--No rashes or lesions      Allergies:   Allergies   Allergen Reactions    Keflex [Cephalexin] Hives         Home Meds:  Medications Prior to Admission   Medication Sig Dispense Refill Last Dose    albuterol sulfate  (90 Base) MCG/ACT inhaler Inhale 2 puffs Every 6 (Six) Hours As Needed.   8/14/2024 at 0700    ALLERGY SERUM INJECTION Inject  under the skin into the appropriate area as directed 1 (One) Time.   Past Week    ALPRAZolam (XANAX) 0.5 MG tablet Take 1 tablet by mouth At Night As Needed.  2 8/13/2024    amLODIPine (NORVASC) 10 MG tablet Take 0.5 tablets by mouth Daily.  3 8/14/2024 at 0800    Ascorbic Acid (Vitamin C) 500 MG capsule Take 1 Capful by mouth Daily.   Past Week    atorvastatin (LIPITOR) 40 MG tablet Take 1 tablet by mouth Every Night.  3 8/13/2024    chlorhexidine (HIBICLENS) 4 % external liquid Apply  topically to the appropriate area as directed Daily. Shower w/ solution for 5 days before surgery. [facility] to [facility] to be filled. 236 mL 0 8/14/2024    Dulera 200-5 MCG/ACT inhaler Inhale 2 puffs 2 (Two) Times a Day.   8/14/2024 at 0800    famotidine (PEPCID) 20 MG tablet Take 1 tablet by mouth 2 (Two) Times a Day.   8/14/2024 at 0800    fenofibrate 160 MG tablet Take 1 tablet by  mouth Every Night.  3 8/13/2024    Gemtesa 75 MG tablet Take 1 tablet by mouth Daily.   8/14/2024 at 0800    Ginkgo Biloba 60 MG capsule Daily.   Past Month    HYDROcodone-acetaminophen (NORCO)  MG per tablet Take 1 tablet by mouth Every 6 (Six) Hours As Needed. for pain   8/14/2024 at 0800    levocetirizine (XYZAL) 5 MG tablet Take 1 tablet by mouth Daily.   8/14/2024 at 0800    Melatonin 10 MG capsule Take 2 capsules by mouth Every Night.   8/13/2024    meloxicam (MOBIC) 15 MG tablet TAKE ONE TABLET BY MOUTH ONCE DAILY AS NEEDED FOR ARTHRITIS PAIN   Past Week    metoprolol tartrate (LOPRESSOR) 25 MG tablet Take 1 tablet by mouth Every 12 (Twelve) Hours.   8/14/2024 at 0800    montelukast (SINGULAIR) 10 MG tablet Take 1 tablet by mouth Every Night.  5 8/13/2024    Multiple Vitamins-Minerals (MULTIVITAMIN ADULT PO) Take 1 tablet by mouth Daily.   8/7/2024    omeprazole (priLOSEC) 20 MG capsule Take 1 capsule by mouth Daily.   8/13/2024    Pediatric Multivitamins-Iron (multivitamin chewable) chewable tablet Chew 1 tablet Daily.   8/7/2024    aspirin  MG EC tablet Take 1 tablet by mouth Daily. 30 tablet 0     azelastine (ASTELIN) 0.1 % nasal spray As Needed.   More than a month    Breo Ellipta 100-25 MCG/ACT aerosol powder  Inhale 1 puff Daily.   Unknown    butalbital-acetaminophen-caffeine (ORBIVAN) -40 MG capsule capsule Take 1 capsule by mouth As Needed.   More than a month    desloratadine (CLARINEX) 5 MG tablet Take 1 tablet by mouth Daily.  5 More than a month    fluticasone (FLONASE) 50 MCG/ACT nasal spray into the nostril(s) as directed by provider Daily.   More than a month    ipratropium (ATROVENT) 0.06 % nasal spray 1 spray into the nostril(s) as directed by provider 2 (Two) Times a Day As Needed for Rhinitis.   More than a month    loratadine (CLARITIN) 10 MG tablet Take 1 tablet by mouth Daily.   More than a month    Omega-3 Fatty Acids (FISH OIL) 1200 MG capsule delayed-release Take 1  "capsule by mouth 2 (Two) Times a Day. 1290   8/7/2024    sildenafil (REVATIO) 20 MG tablet TAKE UP TO 3 TABLETS BY MOUTH EVERY DAY AS NEEDED  5     tamsulosin (FLOMAX) 0.4 MG capsule 24 hr capsule Take 1 capsule by mouth Every Night.   More than a month    Testosterone Cypionate (DEPOTESTOTERONE CYPIONATE) 200 MG/ML injection INJECT 1 ML IM EVERY 14 DAYS-  1 8/7/2024    TURMERIC PO Take  by mouth Daily.   8/7/2024    Zinc 50 MG capsule Daily.   8/7/2024         PMH:   Past Medical History:   Diagnosis Date    Abdominal hernia     Anesthesia complication     \"hard to put to sleep\" duroing bronch    Arthritis     GERD (gastroesophageal reflux disease)     Headache     [place] (hard of hearing)     Hyperlipidemia     Hypertension     Hypertriglyceridemia     Migraine     Seasonal allergies     Sleep apnea with use of continuous positive airway pressure (CPAP)     compliant with machine     SOBOE (shortness of breath on exertion)     Wears glasses     Wears partial dentures     \"dental implant\"  tops and most bottom - crowns and implants  non-removable     PSH:    Past Surgical History:   Procedure Laterality Date    COLONOSCOPY      DENTAL PROCEDURE  08/2018    Dental Implants    ENDOSCOPY      EPIDURAL      injection in shoulder     HERNIA REPAIR      umbilical hernia on left side     KNEE SURGERY Right 2010    scope    SHOULDER SURGERY Right 2010    labrium and rotator cuff- couple times     TONSILLECTOMY      TOTAL HIP ARTHROPLASTY Right 03/19/2019    Procedure: TOTAL RIGHT HIP ARTHROPLASTY;  Surgeon: Anil Graham MD;  Location: [facility];  Service: Orthopedics    WISDOM TOOTH EXTRACTION      all 4 removed     WRIST SURGERY Right     Carpal Tunnel       Immunization History:  Influenza: Yes  Pneumococcal: No  Tetanus: Yes  Covid : x3    Social History:   Tobacco:   Social History     Tobacco Use   Smoking Status Never   Smokeless Tobacco Never      Alcohol:     Social History     Substance and Sexual Activity "   Alcohol Use Yes    Alcohol/week: 14.0 standard drinks of alcohol    Types: 7 Cans of beer, 7 Shots of liquor per week         Physical Exam:/93 (BP Location: Right arm, Patient Position: Lying)   Pulse 61   Temp 98.5 °F (36.9 °C) (Temporal)   Resp 18   SpO2 96%       General Appearance:    Alert, cooperative, no distress, appears stated age   Head:    Normocephalic, without obvious abnormality, atraumatic   Lungs:     Clear to auscultation bilaterally, respirations unlabored    Heart:   Regular rate and rhythm, S1 and S2 normal    Abdomen:    Soft without tenderness   Extremities:   Extremities normal, atraumatic, no cyanosis or edema   Skin:   Skin color, texture, turgor normal, no rashes or lesions   Neurologic:   Grossly intact     Results Review:     LABS:  Lab Results   Component Value Date    WBC 7.03 08/01/2024    HGB 16.6 08/01/2024    HCT 48.8 08/01/2024    MCV 98.0 (H) 08/01/2024     08/01/2024    NEUTROABS 4.45 08/01/2024    GLUCOSE 117 (H) 08/01/2024    BUN 13 08/01/2024    CREATININE 0.94 08/01/2024    EGFRIFNONA 91 03/20/2019     08/01/2024    K 4.0 08/01/2024    CL 99 08/01/2024    CO2 29.0 08/01/2024    CALCIUM 9.6 08/01/2024    ALBUMIN 4.5 08/01/2024    AST 31 08/01/2024    ALT 62 (H) 08/01/2024    BILITOT 0.9 08/01/2024       RADIOLOGY:  Imaging Results (Last 72 Hours)       ** No results found for the last 72 hours. **            I reviewed the patient's new clinical results.    Cancer Staging (if applicable)  Cancer Patient: __ yes _x_no __unknown; If yes, clinical stage T:__ N:__M:__, stage group or __N/A      Impression: Osteoarthritis of left knee      Plan: TOTAL KNEE ARTHROPLASTY WITH SHAKIR ROBOT LEFT - Left       Jose F Coe, APRN   8/14/2024   12:43 EDT

## 2024-08-14 NOTE — ANESTHESIA PROCEDURE NOTES
Spinal Block      Patient reassessed immediately prior to procedure    Patient location during procedure: OR  Start Time: 8/14/2024 2:07 PM  Indication:at surgeon's request  Performed By  CRNA/CAA: Omar Plummer CRNA  Preanesthetic Checklist  Completed: patient identified, IV checked, site marked, risks and benefits discussed, surgical consent, monitors and equipment checked, pre-op evaluation and timeout performed  Spinal Block Prep:  Patient Position:sitting  Sterile Tech:cap, gloves, sterile barriers and mask  Prep:Chloraprep  Patient Monitoring:blood pressure monitoring, continuous pulse oximetry and EKG    Spinal Block Procedure  Approach:midline  Guidance:landmark technique and palpation technique  Location:L4-L5  Needle Type:Esha  Needle Gauge:25 G  Placement of Spinal needle event:cerebrospinal fluid aspirated  Paresthesia: no  Fluid Appearance:clear  Medications: bupivacaine (MARCAINE) 0.5 % injection - Injection   2.2 mL - 8/14/2024 2:02:00 PM   Post Assessment  Patient Tolerance:patient tolerated the procedure well with no apparent complications  Complications no  Additional Notes  Procedure:  Pt assisted to sitting position, with legs in position of comfort over side of bed.  Pt. instructed in optimal spine presentation, the spine was prepped/ Draped and the skin at insertion site was anesthetized with 1% Lidocaine 2 ml.  The spinal needle was then advanced until CSF flow was obtained and LA was injected:

## 2024-08-15 ENCOUNTER — ANESTHESIA EVENT CONVERTED (OUTPATIENT)
Dept: ANESTHESIOLOGY | Facility: HOSPITAL | Age: 59
End: 2024-08-15
Payer: COMMERCIAL

## 2024-08-15 VITALS
OXYGEN SATURATION: 95 % | SYSTOLIC BLOOD PRESSURE: 144 MMHG | DIASTOLIC BLOOD PRESSURE: 88 MMHG | RESPIRATION RATE: 18 BRPM | TEMPERATURE: 98.4 F | HEART RATE: 69 BPM

## 2024-08-15 LAB
ANION GAP SERPL CALCULATED.3IONS-SCNC: 11 MMOL/L (ref 5–15)
BASOPHILS # BLD AUTO: 0.02 10*3/MM3 (ref 0–0.2)
BASOPHILS NFR BLD AUTO: 0.2 % (ref 0–1.5)
BUN SERPL-MCNC: 12 MG/DL (ref 6–20)
BUN/CREAT SERPL: 14 (ref 7–25)
CALCIUM SPEC-SCNC: 8.4 MG/DL (ref 8.6–10.5)
CHLORIDE SERPL-SCNC: 102 MMOL/L (ref 98–107)
CO2 SERPL-SCNC: 25 MMOL/L (ref 22–29)
CREAT SERPL-MCNC: 0.86 MG/DL (ref 0.76–1.27)
DEPRECATED RDW RBC AUTO: 47.7 FL (ref 37–54)
EGFRCR SERPLBLD CKD-EPI 2021: 101 ML/MIN/1.73
EOSINOPHIL # BLD AUTO: 0 10*3/MM3 (ref 0–0.4)
EOSINOPHIL NFR BLD AUTO: 0 % (ref 0.3–6.2)
ERYTHROCYTE [DISTWIDTH] IN BLOOD BY AUTOMATED COUNT: 13.1 % (ref 12.3–15.4)
GLUCOSE SERPL-MCNC: 103 MG/DL (ref 65–99)
HCT VFR BLD AUTO: 42.6 % (ref 37.5–51)
HGB BLD-MCNC: 14.3 G/DL (ref 13–17.7)
IMM GRANULOCYTES # BLD AUTO: 0.06 10*3/MM3 (ref 0–0.05)
IMM GRANULOCYTES NFR BLD AUTO: 0.5 % (ref 0–0.5)
LYMPHOCYTES # BLD AUTO: 1.04 10*3/MM3 (ref 0.7–3.1)
LYMPHOCYTES NFR BLD AUTO: 9.5 % (ref 19.6–45.3)
MCH RBC QN AUTO: 33.4 PG (ref 26.6–33)
MCHC RBC AUTO-ENTMCNC: 33.6 G/DL (ref 31.5–35.7)
MCV RBC AUTO: 99.5 FL (ref 79–97)
MONOCYTES # BLD AUTO: 0.99 10*3/MM3 (ref 0.1–0.9)
MONOCYTES NFR BLD AUTO: 9 % (ref 5–12)
NEUTROPHILS NFR BLD AUTO: 8.87 10*3/MM3 (ref 1.7–7)
NEUTROPHILS NFR BLD AUTO: 80.8 % (ref 42.7–76)
NRBC BLD AUTO-RTO: 0 /100 WBC (ref 0–0.2)
PLATELET # BLD AUTO: 184 10*3/MM3 (ref 140–450)
PMV BLD AUTO: 9.8 FL (ref 6–12)
POTASSIUM SERPL-SCNC: 4.5 MMOL/L (ref 3.5–5.2)
RBC # BLD AUTO: 4.28 10*6/MM3 (ref 4.14–5.8)
SODIUM SERPL-SCNC: 138 MMOL/L (ref 136–145)
WBC NRBC COR # BLD AUTO: 10.98 10*3/MM3 (ref 3.4–10.8)

## 2024-08-15 PROCEDURE — 97116 GAIT TRAINING THERAPY: CPT

## 2024-08-15 PROCEDURE — 94664 DEMO&/EVAL PT USE INHALER: CPT

## 2024-08-15 PROCEDURE — 97161 PT EVAL LOW COMPLEX 20 MIN: CPT

## 2024-08-15 PROCEDURE — 94799 UNLISTED PULMONARY SVC/PX: CPT

## 2024-08-15 PROCEDURE — 85025 COMPLETE CBC W/AUTO DIFF WBC: CPT | Performed by: ORTHOPAEDIC SURGERY

## 2024-08-15 PROCEDURE — 97530 THERAPEUTIC ACTIVITIES: CPT

## 2024-08-15 PROCEDURE — 97165 OT EVAL LOW COMPLEX 30 MIN: CPT

## 2024-08-15 PROCEDURE — 80048 BASIC METABOLIC PNL TOTAL CA: CPT | Performed by: ORTHOPAEDIC SURGERY

## 2024-08-15 PROCEDURE — 99024 POSTOP FOLLOW-UP VISIT: CPT | Performed by: ORTHOPAEDIC SURGERY

## 2024-08-15 PROCEDURE — 97110 THERAPEUTIC EXERCISES: CPT

## 2024-08-15 PROCEDURE — 25010000002 DEXAMETHASONE SODIUM PHOSPHATE 10 MG/ML SOLUTION: Performed by: ANESTHESIOLOGY

## 2024-08-15 PROCEDURE — 25010000002 BUPIVACAINE (PF) 0.25 % SOLUTION: Performed by: ANESTHESIOLOGY

## 2024-08-15 PROCEDURE — 25010000002 VANCOMYCIN HCL IN NACL 1.5-0.9 GM/500ML-% SOLUTION: Performed by: ORTHOPAEDIC SURGERY

## 2024-08-15 PROCEDURE — 97535 SELF CARE MNGMENT TRAINING: CPT

## 2024-08-15 RX ORDER — DEXAMETHASONE SODIUM PHOSPHATE 10 MG/ML
INJECTION, SOLUTION INTRAMUSCULAR; INTRAVENOUS
Status: DISCONTINUED | OUTPATIENT
Start: 2024-08-15 | End: 2024-08-15 | Stop reason: SURG

## 2024-08-15 RX ORDER — BUPIVACAINE HYDROCHLORIDE 2.5 MG/ML
INJECTION, SOLUTION EPIDURAL; INFILTRATION; INTRACAUDAL
Status: DISCONTINUED | OUTPATIENT
Start: 2024-08-15 | End: 2024-08-15 | Stop reason: SURG

## 2024-08-15 RX ADMIN — OXYCODONE HYDROCHLORIDE 5 MG: 5 TABLET ORAL at 08:56

## 2024-08-15 RX ADMIN — OXYCODONE HYDROCHLORIDE 5 MG: 5 TABLET ORAL at 12:55

## 2024-08-15 RX ADMIN — BUDESONIDE AND FORMOTEROL FUMARATE DIHYDRATE 2 PUFF: 160; 4.5 AEROSOL RESPIRATORY (INHALATION) at 08:08

## 2024-08-15 RX ADMIN — ACETAMINOPHEN 500 MG: 500 TABLET ORAL at 08:56

## 2024-08-15 RX ADMIN — OXYCODONE HYDROCHLORIDE 5 MG: 5 TABLET ORAL at 05:04

## 2024-08-15 RX ADMIN — OXYBUTYNIN CHLORIDE 10 MG: 10 TABLET, EXTENDED RELEASE ORAL at 08:56

## 2024-08-15 RX ADMIN — ASPIRIN 81 MG: 81 TABLET, COATED ORAL at 08:56

## 2024-08-15 RX ADMIN — BUPIVACAINE HYDROCHLORIDE 30 ML: 2.5 INJECTION, SOLUTION EPIDURAL; INFILTRATION; INTRACAUDAL; PERINEURAL at 08:36

## 2024-08-15 RX ADMIN — METOPROLOL TARTRATE 25 MG: 25 TABLET, FILM COATED ORAL at 08:56

## 2024-08-15 RX ADMIN — Medication 1500 MG: at 04:00

## 2024-08-15 RX ADMIN — AMLODIPINE BESYLATE 5 MG: 5 TABLET ORAL at 08:56

## 2024-08-15 RX ADMIN — DEXAMETHASONE SODIUM PHOSPHATE 2 MG: 10 INJECTION, SOLUTION INTRAMUSCULAR; INTRAVENOUS at 08:36

## 2024-08-15 RX ADMIN — DOCUSATE SODIUM 100 MG: 100 CAPSULE, LIQUID FILLED ORAL at 08:56

## 2024-08-15 RX ADMIN — ACETAMINOPHEN 1000 MG: 500 TABLET ORAL at 04:00

## 2024-08-15 RX ADMIN — MELOXICAM 15 MG: 15 TABLET ORAL at 08:56

## 2024-08-15 RX ADMIN — FAMOTIDINE 20 MG: 20 TABLET, FILM COATED ORAL at 08:56

## 2024-08-15 NOTE — DISCHARGE SUMMARY
Patient Name: Patricio Iverson  MRN: [id number]  : 1965  DOS: 8/15/2024    Attending: Rolando Mosqueda,*    Primary Care Provider: Sarbjit Rebolledo MD    Date of Admission:.2024  9:47 AM    Date of Discharge:  8/15/2024    Discharge Diagnosis:   OA (osteoarthritis) of knee    HTN (hypertension)    HLD (hyperlipidemia)    FAIZAN on CPAP    Chronic pain    Chronic narcotic use      Hospital Course    At admit:  Patient is a pleasant 58 y.o. male presented for scheduled surgery by Dr. Mosqueda.  He anticipates left total knee replacement today.  His knee has been painful for several years.  He has been using a brace and doing he has tried numerous cortisone injections with short-term pain relief.     He is known to us from previous admission 3/9/19 right hip replacement; which he recovered well.     When seen preop he is doing well.  He denies pain or other complaints.  He denies nausea, shortness of breath or chest pain.  No history of DVT or PE.        After admit:    Patient was provided pain medications as needed for pain control, along with adductor canal nerve block infusion of Ropivacaine.    Adjustments were made to pain medications to optimize postop pain management. Risks and benefits of opiate medications discussed with patient. LIUDMILA report was reviewed.    He was seen by PT and OT and has progressed well over his stay.    He has been twice daily used an IS for atelectasis prophylaxis and heart rate along with mechanicals for DVT prophylaxis.    Home medications were resumed as appropriate, and labs were monitored and remained fairly stable.     With the progress he reviewed home today has made, he is ready for [facility] today.      He will have an Infupump ( instructed on it during this admit).    Discussed with patient regarding plan and he shows understanding and agreement.    Patient will have PT following discharge.        Procedures Performed  Procedure(s):  TOTAL KNEE  ARTHROPLASTY WITH SHAKIR ROBOT LEFT       Pertinent Test Results:    I reviewed the patient's new clinical results.   Results from last 7 days   Lab Units 08/15/24  0501   WBC 10*3/mm3 10.98*   HEMOGLOBIN g/dL 14.3   HEMATOCRIT % 42.6   PLATELETS 10*3/mm3 184     Results from last 7 days   Lab Units 08/15/24  0501   SODIUM mmol/L 138   POTASSIUM mmol/L 4.5   CHLORIDE mmol/L 102   CO2 mmol/L 25.0   BUN mg/dL 12   CREATININE mg/dL 0.86   CALCIUM mg/dL 8.4*   GLUCOSE mg/dL 103*     I reviewed the patient's new imaging including images and reports.      Physical therapy  Pt amb 320' with FWW and CGA. Two episodes of slight knee buckling that improved with VC for active L knee extension in stance phase. Pt navigated a step without difficulty. Activity limited by elevated pain. HEP and precautions reviewed with pt. Frequent ambulation encouraged. Recommend d/c home with assist and OPPT when medically appropriate. Pt cleared to d/c today from PT standpoint.        Anticipated Discharge Disposition (PT): home with assist, home with outpatient therapy services        Discharge Assessment:       Visit Vitals  /88 (BP Location: Left arm, Patient Position: Sitting)   Pulse 69   Temp 98.4 °F (36.9 °C) (Oral)   Resp 18   SpO2 95%     Temp (24hrs), Av.7 °F (36.5 °C), Min:97 °F (36.1 °C), Max:98.7 °F (37.1 °C)      General Appearance:    Alert, cooperative, in no acute distress   Lungs:     Clear to auscultation,respirations regular, even and                   unlabored    Heart:    Regular rhythm and normal rate, normal S1 and S2   Abdomen:     Normal bowel sounds, no masses, no organomegaly, soft        non-tender, non-distended, no guarding, no rebound                 tenderness   Extremities:   CDI dressing surgical knee, right ACB cath present, infupump.   Pulses:   Pulses palpable and equal bilaterally   Skin:   No bleeding, bruising or rash   Neurologic:   Cranial nerves 2 - 12 grossly intact, sensation intact,  Flexion and dorsiflexion intact bilateral feet.         Discharge Disposition: Home today    Discharge Medications     Discharge Medications        New Medications        Instructions Start Date   acetaminophen 650 MG 8 hr tablet  Commonly known as: TYLENOL   650 mg, Oral, Every 8 Hours      aspirin 81 MG EC tablet   81 mg, Oral, 2 Times Daily      docusate sodium 100 MG capsule  Commonly known as: Colace   100 mg, Oral, 2 Times Daily PRN      doxycycline 100 MG tablet  Commonly known as: ADOXA   100 mg, Oral, 2 Times Daily      meloxicam 15 MG tablet  Commonly known as: MOBIC   15 mg, Oral, Daily      ondansetron 4 MG tablet  Commonly known as: Zofran   4 mg, Oral, Every 8 Hours PRN      oxyCODONE 5 MG immediate release tablet  Commonly known as: ROXICODONE   5 mg, Oral, Every 6 Hours PRN             Continue These Medications        Instructions Start Date   albuterol sulfate  (90 Base) MCG/ACT inhaler  Commonly known as: PROVENTIL HFA;VENTOLIN HFA;PROAIR HFA   2 puffs, Inhalation, Every 6 Hours PRN      ALLERGY SERUM INJECTION   Subcutaneous, Once      ALPRAZolam 0.5 MG tablet  Commonly known as: XANAX   0.5 mg, Oral, Nightly PRN      amLODIPine 10 MG tablet  Commonly known as: NORVASC   5 mg, Oral, Daily      atorvastatin 40 MG tablet  Commonly known as: LIPITOR   40 mg, Oral, Nightly      azelastine 0.1 % nasal spray  Commonly known as: ASTELIN   As Needed      Breo Ellipta 100-25 MCG/ACT aerosol powder   Generic drug: Fluticasone Furoate-Vilanterol   1 puff, Inhalation, Daily      butalbital-acetaminophen-caffeine -40 MG capsule capsule  Commonly known as: ORBIVAN   Take 1 capsule by mouth As Needed.      desloratadine 5 MG tablet  Commonly known as: CLARINEX   5 mg, Oral, Daily      Dulera 200-5 MCG/ACT inhaler  Generic drug: mometasone-formoterol   Inhale 2 puffs 2 (Two) Times a Day.      famotidine 20 MG tablet  Commonly known as: PEPCID   1 tablet, Oral, 2 Times Daily      fenofibrate 160 MG  tablet   160 mg, Oral, Nightly      Fish Oil 1200 MG capsule delayed-release capsule   1 capsule, Oral, 2 Times Daily, 1290       fluticasone 50 MCG/ACT nasal spray  Commonly known as: FLONASE   Nasal, Every 24 Hours      Gemtesa 75 MG tablet  Generic drug: Vibegron   1 tablet, Oral, Daily      Ginkgo Biloba 60 MG capsule   Daily      HYDROcodone-acetaminophen  MG per tablet  Commonly known as: NORCO   1 tablet, Oral, Every 6 Hours PRN, for pain      ipratropium 0.06 % nasal spray  Commonly known as: ATROVENT   1 spray, Nasal, 2 Times Daily PRN      levocetirizine 5 MG tablet  Commonly known as: XYZAL   5 mg, Oral, Daily      loratadine 10 MG tablet  Commonly known as: CLARITIN   10 mg, Oral, Daily      Melatonin 10 MG capsule   2 capsules, Oral, Nightly      metoprolol tartrate 25 MG tablet  Commonly known as: LOPRESSOR   1 tablet, Oral, Every 12 Hours Scheduled      montelukast 10 MG tablet  Commonly known as: SINGULAIR   10 mg, Oral, Nightly      multivitamin pediatric chewable chewable tablet   1 tablet, Oral, Daily      multivitamin with minerals tablet tablet   1 capsule, Oral, Daily      sildenafil 20 MG tablet  Commonly known as: REVATIO   TAKE UP TO 3 TABLETS BY MOUTH EVERY DAY AS NEEDED      tamsulosin 0.4 MG capsule 24 hr capsule  Commonly known as: FLOMAX   1 capsule, Oral, Nightly      Testosterone Cypionate 200 MG/ML injection  Commonly known as: DEPOTESTOTERONE CYPIONATE   INJECT 1 ML IM EVERY 14 DAYS-      TURMERIC PO   Oral, Daily      Vitamin C 500 MG capsule   1 Capful, Oral, Daily      Zinc 50 MG capsule   Daily                 Follow-up Appointments:  Future Appointments   Date Time Provider Department Center   9/5/2024  9:20 AM Rolando Mosqueda MD [facility]         Dragon disclaimer:  Part of this encounter note is an electronic transcription/translation of spoken language to printed text. The electronic translation of spoken language may permit erroneous, or at times,  nonsensical words or phrases to be inadvertently transcribed; Although I have reviewed the note for such errors, some may still exist.       Ari Doyle MD  08/15/24  11:25 EDT

## 2024-08-15 NOTE — ADDENDUM NOTE
Addendum  created 08/15/24 0826 by Kody Laws, CRNA    Child order released for a procedure order, Clinical Note Signed, Intraprocedure Blocks edited, SmartForm saved

## 2024-08-15 NOTE — PLAN OF CARE
Problem: Adult Inpatient Plan of Care  Goal: Plan of Care Review  Recent Flowsheet Documentation  Taken 8/15/2024 1318 by Gerri Barcenas OT  Progress: (OT IE) no change  Plan of Care Reviewed With: patient  Outcome Evaluation: OT evaluation completed. Postsurgically, pt presents with minimal decrease in I in ADLs, related t/fs, mobility limited by mild balance deficits, decreased distal reach with increased pain at L LE and decreased ROM impacting I in LB ADLs, mild fatigue with more dynamic demands and safety awareness deficits of which improved with training. Pt received UIC and req'd CGA for STS at recliner and during in room ADL related mobility with FWW support and intermittent cues on optimal AD mgmt and safe turning, pivoting. OT issued LH AE for A in LB reach with pt demonstrating improved I in d/d socks w/ CGA with use. Pt demonstsrated difficulty ascending and descending from toilet requiring min A with need for elevated height and BUE support of which pt was recommended at home. Pt performance improved with use. Recommend IPOT POC and home w/ A and OPPT at d/c when medically ready.

## 2024-08-15 NOTE — PLAN OF CARE
Problem: Adult Inpatient Plan of Care  Goal: Plan of Care Review  Outcome: Ongoing, Progressing  Goal: Patient-Specific Goal (Individualized)  Outcome: Ongoing, Progressing  Goal: Absence of Hospital-Acquired Illness or Injury  Outcome: Ongoing, Progressing  Intervention: Identify and Manage Fall Risk  Recent Flowsheet Documentation  Taken 8/15/2024 0355 by Omar Oh RN  Safety Promotion/Fall Prevention:   activity supervised   assistive device/personal items within reach   clutter free environment maintained   fall prevention program maintained   gait belt   mobility aid in reach   nonskid shoes/slippers when out of bed   room organization consistent   safety round/check completed  Taken 8/15/2024 0200 by Omar Oh RN  Safety Promotion/Fall Prevention:   activity supervised   assistive device/personal items within reach   clutter free environment maintained   fall prevention program maintained   gait belt   mobility aid in reach   nonskid shoes/slippers when out of bed   room organization consistent   safety round/check completed  Taken 8/14/2024 2354 by Omar Oh RN  Safety Promotion/Fall Prevention:   activity supervised   assistive device/personal items within reach   clutter free environment maintained   fall prevention program maintained   gait belt   mobility aid in reach   nonskid shoes/slippers when out of bed   room organization consistent   safety round/check completed  Taken 8/14/2024 2214 by Omar Oh RN  Safety Promotion/Fall Prevention:   activity supervised   assistive device/personal items within reach   clutter free environment maintained   fall prevention program maintained   gait belt   mobility aid in reach   nonskid shoes/slippers when out of bed   room organization consistent   safety round/check completed  Taken 8/14/2024 2150 by Omar Oh RN  Safety Promotion/Fall Prevention:   activity supervised   assistive device/personal items within reach  Intervention:  Prevent Skin Injury  Recent Flowsheet Documentation  Taken 8/15/2024 0600 by Omar Oh RN  Body Position: position changed independently  Taken 8/15/2024 0355 by Omar Oh RN  Body Position: position changed independently  Taken 8/15/2024 0200 by Omar Oh RN  Body Position: position changed independently  Taken 8/14/2024 2354 by Omar Oh RN  Body Position: position changed independently  Taken 8/14/2024 2214 by Omar Oh RN  Body Position: position changed independently  Taken 8/14/2024 2150 by Omar Oh RN  Body Position: position changed independently  Intervention: Prevent and Manage VTE (Venous Thromboembolism) Risk  Recent Flowsheet Documentation  Taken 8/15/2024 0600 by Omar Oh RN  Activity Management: activity encouraged  Taken 8/15/2024 0355 by Omar Oh RN  VTE Prevention/Management:   bilateral   sequential compression devices on  Taken 8/15/2024 0200 by Omar Oh RN  VTE Prevention/Management:   bilateral   sequential compression devices on  Taken 8/14/2024 2354 by Omar Oh RN  VTE Prevention/Management:   bilateral   sequential compression devices on  Taken 8/14/2024 2214 by Omar Oh RN  VTE Prevention/Management:   bilateral   sequential compression devices on  Range of Motion: active ROM (range of motion) encouraged  Taken 8/14/2024 2150 by Omar Oh RN  VTE Prevention/Management:   bilateral   sequential compression devices on  Taken 8/14/2024 2109 by Omar Oh RN  Activity Management: ambulated outside room  Intervention: Prevent Infection  Recent Flowsheet Documentation  Taken 8/15/2024 0600 by Omar Oh RN  Infection Prevention:   rest/sleep promoted   single patient room provided  Taken 8/15/2024 0355 by Omar Oh RN  Infection Prevention:   environmental surveillance performed   rest/sleep promoted   single patient room provided  Taken 8/15/2024 0200 by Omar Oh RN  Infection Prevention:    environmental surveillance performed   single patient room provided   rest/sleep promoted  Taken 8/14/2024 2354 by Omar Oh RN  Infection Prevention:   environmental surveillance performed   rest/sleep promoted   single patient room provided  Goal: Optimal Comfort and Wellbeing  Outcome: Ongoing, Progressing  Intervention: Monitor Pain and Promote Comfort  Recent Flowsheet Documentation  Taken 8/15/2024 0600 by Omar Oh RN  Pain Management Interventions:   see MAR   quiet environment facilitated  Taken 8/15/2024 0355 by Omar Oh RN  Pain Management Interventions:   see MAR   pain pump in use  Taken 8/15/2024 0200 by Omar Oh RN  Pain Management Interventions: quiet environment facilitated  Taken 8/14/2024 2354 by Omar Oh RN  Pain Management Interventions: see MAR  Taken 8/14/2024 2214 by Omar Oh RN  Pain Management Interventions:   position adjusted   pillow support provided   see MAR   pain pump in use  Taken 8/14/2024 2150 by Omar Oh RN  Pain Management Interventions:   pain pump in use   see MAR  Intervention: Provide Person-Centered Care  Recent Flowsheet Documentation  Taken 8/15/2024 0600 by Omar Oh RN  Trust Relationship/Rapport: care explained  Taken 8/15/2024 0355 by Omar Oh RN  Trust Relationship/Rapport: care explained  Taken 8/15/2024 0200 by Omar Oh RN  Trust Relationship/Rapport: reassurance provided  Taken 8/14/2024 2354 by Omar Oh RN  Trust Relationship/Rapport: care explained  Taken 8/14/2024 2214 by Omar Oh RN  Trust Relationship/Rapport: care explained  Taken 8/14/2024 2150 by Omar Oh RN  Trust Relationship/Rapport: care explained  Goal: Readiness for Transition of Care  Outcome: Ongoing, Progressing     Problem: Asthma Comorbidity  Goal: Maintenance of Asthma Control  Outcome: Ongoing, Progressing  Intervention: Maintain Asthma Symptom Control  Recent Flowsheet Documentation  Taken 8/14/2024 2214  by Omar Oh, RN  Medication Review/Management: medications reviewed   Goal Outcome Evaluation:     Pt A&Ox4. VSS, Room air. Dressing to left knee CDI. Infublock in place. Patient ambulating with walker and gait bed with staff assist.

## 2024-08-15 NOTE — THERAPY EVALUATION
"Patient Name: Patricio Iverson  : 1965    MRN: [id number]                              Today's Date: 8/15/2024       Admit Date: 2024    Visit Dx:     ICD-10-CM ICD-9-CM   1. Primary osteoarthritis of left knee  M17.12 715.16     Patient Active Problem List   Diagnosis    Primary osteoarthritis of right hip    HTN (hypertension)    HLD (hyperlipidemia)    FAIZAN on CPAP    Chronic pain    Chronic narcotic use    Status post total hip replacement, right    Osteoarthritis    Closed displaced fracture of shaft of fourth metacarpal bone of left hand    OA (osteoarthritis) of knee     Past Medical History:   Diagnosis Date    Abdominal hernia     Anesthesia complication     \"hard to put to sleep\" duroing bronch    Arthritis     GERD (gastroesophageal reflux disease)     Headache     [place] (hard of hearing)     Hyperlipidemia     Hypertension     Hypertriglyceridemia     Migraine     Seasonal allergies     Sleep apnea with use of continuous positive airway pressure (CPAP)     compliant with machine     SOBOE (shortness of breath on exertion)     Wears glasses     Wears partial dentures     \"dental implant\"  tops and most bottom - crowns and implants  non-removable     Past Surgical History:   Procedure Laterality Date    COLONOSCOPY      DENTAL PROCEDURE  2018    Dental Implants    ENDOSCOPY      EPIDURAL      injection in shoulder     HERNIA REPAIR      umbilical hernia on left side     KNEE SURGERY Right 2010    scope    SHOULDER SURGERY Right 2010    labrium and rotator cuff- couple times     TONSILLECTOMY      TOTAL HIP ARTHROPLASTY Right 2019    Procedure: TOTAL RIGHT HIP ARTHROPLASTY;  Surgeon: Anil Graham MD;  Location: [facility];  Service: Orthopedics    WISDOM TOOTH EXTRACTION      all 4 removed     WRIST SURGERY Right     Carpal Tunnel      General Information       Row Name 08/15/24 0942          Physical Therapy Time and Intention    Document Type evaluation  -HW     Mode of " Treatment physical therapy  -       Row Name 08/15/24 0942          General Information    Patient Profile Reviewed yes  -     Prior Level of Function min assist:;all household mobility;community mobility;gait;transfer;bed mobility;ADL's  denied use of AD or recent falls; lifestyle limited by knee pain  -     Existing Precautions/Restrictions fall;other (see comments)  adductor canal nerve cath  -     Barriers to Rehab none identified  -       Row Name 08/15/24 0942          Living Environment    People in Home significant other  -       Row Name 08/15/24 0942          Home Main Entrance    Number of Stairs, Main Entrance none  -       Row Name 08/15/24 0942          Stairs Within Home, Primary    Stairs, Within Home, Primary pt built platform step for getting in/out of elevated bed  -     Number of Stairs, Within Home, Primary one  -       Row Name 08/15/24 0942          Cognition    Orientation Status (Cognition) oriented x 3  -       Row Name 08/15/24 0942          Safety Issues, Functional Mobility    Safety Issues Affecting Function (Mobility) awareness of need for assistance;insight into deficits/self-awareness  -     Impairments Affecting Function (Mobility) balance;endurance/activity tolerance;pain;range of motion (ROM);strength  -               User Key  (r) = Recorded By, (t) = Taken By, (c) = Cosigned By      Initials Name Provider Type    HW Anusha Whitt PT Physical Therapist                   Mobility       Row Name 08/15/24 0943          Bed Mobility    Bed Mobility supine-sit  -     Supine-Sit [place] (Bed Mobility) standby assist  -     Assistive Device (Bed Mobility) head of bed elevated  -     Comment, (Bed Mobility) VC for line management  -       Row Name 08/15/24 0943          Transfers    Comment, (Transfers) VC for hand placement to push up from bed and reach back prior to sitting. Encouraged stagger stance when sitting to avoid painful knee flexion  -        Row Name 08/15/24 0943          Sit-Stand Transfer    Sit-Stand [place] (Transfers) contact guard;nonverbal cues (demo/gesture);verbal cues;2 person assist  -HW     Assistive Device (Sit-Stand Transfers) walker, front-wheeled  -HW       Row Name 08/15/24 0943          Gait/Stairs (Locomotion)    [place] Level (Gait) contact guard;verbal cues;nonverbal cues (demo/gesture)  -HW     Assistive Device (Gait) walker, front-wheeled  -HW     Distance in Feet (Gait) 320  -HW     Deviations/Abnormal Patterns (Gait) bilateral deviations;weight shifting decreased;stride length decreased;gait speed decreased  -HW     Bilateral Gait Deviations forward flexed posture;heel strike decreased  -HW     [place] Level (Stairs) contact guard;nonverbal cues (demo/gesture);verbal cues;2 person assist  -HW     Assistive Device (Stairs) walker, front-wheeled  -HW     Number of Steps (Stairs) 1  -HW     Ascending Technique (Stairs) step-to-step  -HW     Descending Technique (Stairs) step-to-step  -HW     Comment, (Gait/Stairs) Pt amb in morel with step-through gait pattern. VC for upright posture, avoiding pivoting on L knee, increased stride length, and active knee extension in stance phase. Encouraged pt to stay closer to AD and keep walker rolling forward at consistent pace to decrease reliance on BUE. Activity limited by elevated pain. Two episodes of knee buckling noted when pt attempted to turn quickly. Pt able to catch his self with no significant LOB observed. Pt navigated a step with backwards technique and FWW use.  -       Row Name 08/15/24 0943          Mobility    Extremity Weight-bearing Status left lower extremity  -HW     Left Lower Extremity (Weight-bearing Status) weight-bearing as tolerated (WBAT)  -               User Key  (r) = Recorded By, (t) = Taken By, (c) = Cosigned By      Initials Name Provider Type    Anusha Sifuentes PT Physical Therapist                   Obj/Interventions       Row Name  08/15/24 1024          Range of Motion Comprehensive    General Range of Motion lower extremity range of motion deficits identified  -     Comment, General Range of Motion Surgical knee ROM: 13-82  -[facility] Name 08/15/24 1024          Strength Comprehensive (MMT)    General Manual Muscle Testing (MMT) Assessment lower extremity strength deficits identified  -     Comment, General Manual Muscle Testing (MMT) Assessment Pt able to perform B active ankle DF and SLR  -[facility] Name 08/15/24 1024          Motor Skills    Therapeutic Exercise knee;ankle  -HW       Row Name 08/15/24 1024          Knee (Therapeutic Exercise)    Knee (Therapeutic Exercise) isometric exercises;strengthening exercise  -     Knee Isometrics (Therapeutic Exercise) left;quad sets;10 repetitions  -     Knee Strengthening (Therapeutic Exercise) left;heel slides;SLR (straight leg raise);SAQ (short arc quad);LAQ (long arc quad);10 repetitions  -[facility] Name 08/15/24 1024          Ankle (Therapeutic Exercise)    Ankle (Therapeutic Exercise) AROM (active range of motion)  -     Ankle AROM (Therapeutic Exercise) bilateral;dorsiflexion;plantarflexion;10 repetitions  -HW       Row Name 08/15/24 1024          Balance    Balance Assessment sitting static balance;sitting dynamic balance;sit to stand dynamic balance;standing static balance;standing dynamic balance  -     Static Sitting Balance standby assist  -     Dynamic Sitting Balance standby assist  -     Position, Sitting Balance sitting edge of bed  -     Static Standing Balance contact guard  -     Dynamic Standing Balance contact guard  -     Position/Device Used, Standing Balance supported;walker, rolling  -     Balance Interventions sitting;standing;sit to stand;occupation based/functional task  -[facility] Name 08/15/24 1024          Sensory Assessment (Somatosensory)    Sensory Assessment (Somatosensory) LE sensation intact  -               User Key  (r)  = Recorded By, (t) = Taken By, (c) = Cosigned By      Initials Name Provider Type    HW Anusha Whitt, PT Physical Therapist                   Goals/Plan       Row Name 08/15/24 1027          Bed Mobility Goal 1 (PT)    Activity/Assistive Device (Bed Mobility Goal 1, PT) sit to supine/supine to sit  -HW     [place] Level/Cues Needed (Bed Mobility Goal 1, PT) modified independence  -HW     Time Frame (Bed Mobility Goal 1, PT) short term goal (STG);2 days  -       Row Name 08/15/24 1027          Transfer Goal 1 (PT)    Activity/Assistive Device (Transfer Goal 1, PT) sit-to-stand/stand-to-sit  -HW     [place] Level/Cues Needed (Transfer Goal 1, PT) modified independence  -HW     Time Frame (Transfer Goal 1, PT) long term goal (LTG);3 days  -       Row Name 08/15/24 1027          Gait Training Goal 1 (PT)    Activity/Assistive Device (Gait Training Goal 1, PT) gait (walking locomotion)  -HW     [place] Level (Gait Training Goal 1, PT) modified independence  -HW     Distance (Gait Training Goal 1, PT) 500  -HW     Time Frame (Gait Training Goal 1, PT) long term goal (LTG);3 days  -       Row Name 08/15/24 1027          ROM Goal 1 (PT)    ROM Goal 1 (PT) Surgical Knee ROM: 0-90  -HW     Time Frame (ROM Goal 1, PT) long-term goal (LTG);3 days  -[facility] Name 08/15/24 1027          Stairs Goal 1 (PT)    Activity/Assistive Device (Stairs Goal 1, PT) ascending stairs;descending stairs  -     [place] Level/Cues Needed (Stairs Goal 1, PT) modified independence  -HW     Number of Stairs (Stairs Goal 1, PT) 1  -HW     Time Frame (Stairs Goal 1, PT) long term goal (LTG);3 days  -       Row Name 08/15/24 1027          Therapy Assessment/Plan (PT)    Planned Therapy Interventions (PT) balance training;bed mobility training;gait training;home exercise program;ROM (range of motion);patient/family education;stair training;strengthening;stretching;transfer training  -               User Key  (r) =  Recorded By, (t) = Taken By, (c) = Cosigned By      Initials Name Provider Type     Anusha Whitt, MARILU Physical Therapist                   Clinical Impression       Row Name 08/15/24 1024          Pain    Pretreatment Pain Rating 8/10  -     Posttreatment Pain Rating 9/10  -     Pain Location - Side/Orientation Left  -     Pain Location generalized  -     Pain Location - knee  -     Pre/Posttreatment Pain Comment pain reported all around knee, front and back  -     Pain Intervention(s) Ambulation/increased activity;Repositioned;Elevated;Cold applied  -HW       Row Name 08/15/24 1024          Plan of Care Review    Plan of Care Reviewed With patient  -     Progress no change  -     Outcome Evaluation Pt amb 320' with FWW and CGA. Two episodes of slight knee buckling that improved with VC for active L knee extension in stance phase. Pt navigated a step without difficulty. Activity limited by elevated pain. HEP and precautions reviewed with pt. Frequent ambulation encouraged. Recommend d/c home with assist and OPPT when medically appropriate. Pt cleared to d/c today from PT standpoint.  -HW       Row Name 08/15/24 1024          Therapy Assessment/Plan (PT)    Rehab Potential (PT) good, to achieve stated therapy goals  -     Criteria for Skilled Interventions Met (PT) yes;meets criteria;skilled treatment is necessary  -     Therapy Frequency (PT) 2 times/day  -HW       Row Name 08/15/24 1024          Positioning and Restraints    Pre-Treatment Position in bed  -     Post Treatment Position chair  -HW     In Chair notified nsg;reclined;sitting;call light within reach;exit alarm on;encouraged to call for assist;with family/caregiver;legs elevated;compression device  ice applied  -               User Key  (r) = Recorded By, (t) = Taken By, (c) = Cosigned By      Initials Name Provider Type     Anusha Whitt, MARILU Physical Therapist                   Outcome Measures       Row Name 08/15/24 1028           How much help from another person do you currently need...    Turning from your back to your side while in flat bed without using bedrails? 3  -HW     Moving from lying on back to sitting on the side of a flat bed without bedrails? 3  -HW     Moving to and from a bed to a chair (including a wheelchair)? 3  -HW     Standing up from a chair using your arms (e.g., wheelchair, bedside chair)? 3  -HW     Climbing 3-5 steps with a railing? 3  -HW     To walk in hospital room? 3  -HW     AM-PAC 6 Clicks Score (PT) 18  -HW     Highest Level of Mobility Goal 6 --> Walk 10 steps or more  -       Row Name 08/15/24 1028          PADD    Diagnosis 1  -     Gender 2  -     Age Group 3  -HW     Gait Distance 1  -HW     Assist Level 1  -     Home Support 3  -HW     PADD Score 10  -HW     Patient Preference home with outpatient rehab  -     Prediction by PADD Score directly home (with home health or out-patient rehab)  -       Row Name 08/15/24 1028          Functional Assessment    Outcome Measure Options AM-PAC 6 Clicks Basic Mobility (PT);PADD  -               User Key  (r) = Recorded By, (t) = Taken By, (c) = Cosigned By      Initials Name Provider Type    HW Anusha Whitt PT Physical Therapist                                 Physical Therapy Education       Title: PT OT SLP Therapies (In Progress)       Topic: Physical Therapy (Done)       Point: Mobility training (Done)       Learning Progress Summary             Patient Acceptance, E,D, VU,DU by  at 8/15/2024 1028                         Point: Home exercise program (Done)       Learning Progress Summary             Patient Acceptance, E,D, VU,DU by  at 8/15/2024 1028                         Point: Body mechanics (Done)       Learning Progress Summary             Patient Acceptance, E,D, VU,DU by  at 8/15/2024 1028                         Point: Precautions (Done)       Learning Progress Summary             Patient Acceptance, E,D, VU,DU by  at  8/15/2024 1028                                         User Key       Initials Effective Dates Name Provider Type Discipline     12/15/23 -  Anusha Whitt, MARILU Physical Therapist PT                  PT Recommendation and Plan  Planned Therapy Interventions (PT): balance training, bed mobility training, gait training, home exercise program, ROM (range of motion), patient/family education, stair training, strengthening, stretching, transfer training  Plan of Care Reviewed With: patient  Progress: no change  Outcome Evaluation: Pt amb 320' with FWW and CGA. Two episodes of slight knee buckling that improved with VC for active L knee extension in stance phase. Pt navigated a step without difficulty. Activity limited by elevated pain. HEP and precautions reviewed with pt. Frequent ambulation encouraged. Recommend d/c home with assist and OPPT when medically appropriate. Pt cleared to d/c today from PT standpoint.     Time Calculation:   PT Evaluation Complexity  History, PT Evaluation Complexity: 1-2 personal factors and/or comorbidities  Examination of Body Systems (PT Eval Complexity): total of 3 or more elements  Clinical Presentation (PT Evaluation Complexity): stable  Clinical Decision Making (PT Evaluation Complexity): low complexity  Overall Complexity (PT Evaluation Complexity): low complexity     PT Charges       Row Name 08/15/24 1028             Time Calculation    Start Time 0846  -      PT Received On 08/15/24  -      PT Goal Re-Cert Due Date 08/25/24  -         Timed Charges    16959 - PT Therapeutic Exercise Minutes 12  -      45104 - Gait Training Minutes  12  -         Untimed Charges    PT Eval/Re-eval Minutes 39  -HW         Total Minutes    Timed Charges Total Minutes 24  -HW      Untimed Charges Total Minutes 39  -HW       Total Minutes 63  -HW                User Key  (r) = Recorded By, (t) = Taken By, (c) = Cosigned By      Initials Name Provider Type     Anusha Whitt PT Physical  Therapist                  Therapy Charges for Today       Code Description Service Date Service Provider Modifiers Qty    95353657181  PT THER PROC EA 15 MIN 8/15/2024 Anusha Whitt, PT GP 1    [id number] HC GAIT TRAINING EA 15 MIN 8/15/2024 Anusha Whitt, PT GP 1    [id number]  PT EVAL LOW COMPLEXITY 3 8/15/2024 Anusha Whitt, PT GP 1            PT G-Codes  Outcome Measure Options: AM-PAC 6 Clicks Basic Mobility (PT), PADD  AM-PAC 6 Clicks Score (PT): 18  PT Discharge Summary  Anticipated Discharge Disposition (PT): home with assist, home with outpatient therapy services    Anusha Whitt, PT  8/15/2024

## 2024-08-15 NOTE — PLAN OF CARE
Goal Outcome Evaluation:  Plan of Care Reviewed With: patient        Progress: no change  Outcome Evaluation: Pt amb 320' with FWW and CGA. Two episodes of slight knee buckling that improved with VC for active L knee extension in stance phase. Pt navigated a step without difficulty. Activity limited by elevated pain. HEP and precautions reviewed with pt. Frequent ambulation encouraged. Recommend d/c home with assist and OPPT when medically appropriate. Pt cleared to d/c today from PT standpoint.      Anticipated Discharge Disposition (PT): home with assist, home with outpatient therapy services

## 2024-08-15 NOTE — THERAPY EVALUATION
"Patient Name: Patricio Iverson  : 1965    MRN: [id number]                              Today's Date: 8/15/2024       Admit Date: 2024    Visit Dx:     ICD-10-CM ICD-9-CM   1. Primary osteoarthritis of left knee  M17.12 715.16     Patient Active Problem List   Diagnosis    Primary osteoarthritis of right hip    HTN (hypertension)    HLD (hyperlipidemia)    FAIZAN on CPAP    Chronic pain    Chronic narcotic use    Status post total hip replacement, right    Osteoarthritis    Closed displaced fracture of shaft of fourth metacarpal bone of left hand    OA (osteoarthritis) of knee     Past Medical History:   Diagnosis Date    Abdominal hernia     Anesthesia complication     \"hard to put to sleep\" duroing bronch    Arthritis     GERD (gastroesophageal reflux disease)     Headache     [place] (hard of hearing)     Hyperlipidemia     Hypertension     Hypertriglyceridemia     Migraine     Seasonal allergies     Sleep apnea with use of continuous positive airway pressure (CPAP)     compliant with machine     SOBOE (shortness of breath on exertion)     Wears glasses     Wears partial dentures     \"dental implant\"  tops and most bottom - crowns and implants  non-removable     Past Surgical History:   Procedure Laterality Date    COLONOSCOPY      DENTAL PROCEDURE  2018    Dental Implants    ENDOSCOPY      EPIDURAL      injection in shoulder     HERNIA REPAIR      umbilical hernia on left side     KNEE SURGERY Right 2010    scope    SHOULDER SURGERY Right 2010    labrium and rotator cuff- couple times     TONSILLECTOMY      TOTAL HIP ARTHROPLASTY Right 2019    Procedure: TOTAL RIGHT HIP ARTHROPLASTY;  Surgeon: Anil Graham MD;  Location: [facility];  Service: Orthopedics    WISDOM TOOTH EXTRACTION      all 4 removed     WRIST SURGERY Right     Carpal Tunnel      General Information       Row Name 08/15/24 1004          OT Time and Intention    Document Type evaluation  -JY     Mode of Treatment " occupational therapy;individual therapy  -       Row Name 08/15/24 1004          General Information    Patient Profile Reviewed yes  -JY     Prior Level of Function min assist:;all household mobility;community mobility;gait;transfer;bed mobility;dressing;bathing;independent:;feeding;grooming;driving;work  pt performance in LB tasks limited by L knee pain, pt denies use of AD, actively working in home repair, remodel w/ lifestyle limited by R knee pain; [place] farm [place] terrain in last 6 months  -JY     Existing Precautions/Restrictions fall;other (see comments)  L adductor canal nerve catheter  -JY     Barriers to Rehab none identified  -J       Row Name 08/15/24 1004          Occupational Profile    Environmental Supports and Barriers (Occupational Profile) walk in shower w/ threshold, standard toilet height (discussed options for elevated height and BUE support), DME: has access to FWW, not using AD at baseline  -JY     Patient Goals (Occupational Profile) to [facility] to [facility]  [facility]          Living Environment    People in Home significant other  -JY       [facility] Name 08/15/24 1004          Home Main Entrance    Number of Stairs, Main Entrance none  -[facility] Name 08/15/24 1004          Stairs Within Home, Primary    Stairs, Within Home, Primary pt has flight of stairs to upper level, does not have to use upon return home w/ bed, bath and kitchen on main level; does have platform step for getting in/out of elevated bed  -JY     Number of Stairs, Within Home, Primary one  [facility] 08/[facility]          Cognition    Orientation Status (Cognition) oriented x 4  -JY       [facility] Name 08/15/24 1004          Safety Issues, Functional Mobility    Safety Issues Affecting Function (Mobility) awareness of need for assistance;insight into deficits/self-awareness;safety precaution awareness;safety precautions follow-through/compliance  -JY     Impairments Affecting Function  (Mobility) balance;endurance/activity tolerance;pain;range of motion (ROM);strength  -JY     Comment, Safety Issues/Impairments (Mobility) pt alert and able to follow commands; intermittent cues for wx mgmt and general safety  -JY               User Key  (r) = Recorded By, (t) = Taken By, (c) = Cosigned By      Initials Name Provider Type    Gerri Ribera OT Occupational Therapist                     Mobility/ADL's       Row Name 08/15/24 1126          Bed Mobility    Bed Mobility other (see comments)  -PRICILA     Comment, (Bed Mobility) received sitting UIC and preferred to remain OOB thus bed mobility not assessed this date; pt reports having platform step up into bed and practiced step mgmt during PT earlier  -JJAISON       Row Name 08/15/24 1126          Transfers    Transfers sit-stand transfer;stand-sit transfer;toilet transfer  -JJAISON     Comment, (Transfers) skilled cues for optimal hand placement for controlled ascend, descend specifically to push up from seated surface to stand and to reach back prior to sitting once aligned and in close proximity to seated surface, avoid holding to FWW for support during transition d/t fall risk; further cued to step in simultaneous movement with FWW during turning to avoid pivot at L knee and to step LLE forward prior to sitting for comfort  -JJAISON       Row Name 08/15/24 1126          Sit-Stand Transfer    Sit-Stand Morgan (Transfers) contact guard;verbal cues  -JJAISON     Assistive Device (Sit-Stand Transfers) walker, front-wheeled  -JJAISON       Row Name 08/15/24 1126          Stand-Sit Transfer    Stand-Sit Morgan (Transfers) contact guard;verbal cues  -JY     Assistive Device (Stand-Sit Transfers) walker, front-wheeled  -JY       Row Name 08/15/24 1126          Toilet Transfer    Type (Toilet Transfer) sit-stand;stand-sit  -PRICILA     Morgan Level (Toilet Transfer) minimum assist (75% patient effort);verbal cues  -JY     Assistive Device (Toilet Transfer) commode;david  bars/safety frame;commode, bedside without drop arms;walker, front-wheeled  -     Comment, (Toilet Transfer) initially set pt up with riser and L grab bar and pt relying too much on FWW for support increasing fall risk, placed BSC over top of toilet to provide BUE support for controlled ascend, descend and pt able to complete more safely with use, recommended use at d/c  -JY       Row Name 08/15/24 1126          Functional Mobility    Functional Mobility- Ind. Level contact guard assist;verbal cues required  -     Functional Mobility- Device walker, front-wheeled  -     Functional Mobility-Distance (Feet) --  in room ADL related mobility  -     Functional Mobility- Comment defer to PT For specifics; during in room ADL related mobility pt req'd gross CGA w/ FWW support throughout; pt req'd intermittent cues for optimal AD placement in close proximity to self, using wheels for ease vs increased energy expenditure with picking FWW up  -     Patient was able to Ambulate yes  -JY       Row Name 08/15/24 1126          Activities of Daily Living    BADL Assessment/Intervention upper body dressing;lower body dressing;bathing  -[facility] 08/15/24 [facility]6          Mobility    Extremity Weight-bearing Status left lower extremity  -     Left Lower Extremity (Weight-bearing Status) weight-bearing as tolerated (WBAT)  -JY       Row Name 08/15/24 [facility]6          Upper Body Dressing Assessment/Training    [place] Level (Upper Body Dressing) doff;pajama/robe;don;pull-over garment;independent  -     Position (Upper Body Dressing) unsupported sitting  -JY       Row Name 08/15/24 [facility]6          Lower Body Dressing Assessment/Training    [place] Level (Lower Body Dressing) doff;don;socks;contact guard assist  -     Assistive Devices (Lower Body Dressing) sock-aid;long-handled shoe horn;reacher  issued LH AE to assist with distal reach given increased pain, ROM limitations at LLE; educated pt on each  -J      Position (Lower Body Dressing) unsupported sitting  -HUSSAIN     Comment, (Lower Body Dressing) educated pt on optimal seq for threading and unthreading, preferred position and tech for LBD with use of AE PRN while closely monitoring nerve cath to avoid dislodging; pt able to return demo use of LH AE to d/d socks, shorts already donned however good understanding of nerve cath mgmt  -JY       Row Name 08/15/24 1126          Bathing Assessment/Intervention    [place] Level (Bathing) lower body;not tested  -JY     Assistive Devices (Bathing) long-handled sponge  -     Comment, (Bathing) did not assess authentic bathing this date d/t nerve cath still in place, emphasized no showering while nerve cath still in place; pt able to return demo reach toward LEs with sponge  -               User Key  (r) = Recorded By, (t) = Taken By, (c) = Cosigned By      Initials Name Provider Type    Gerri Ribera OT Occupational Therapist                   Obj/Interventions       Row Name 08/15/24 1314          Sensory Assessment (Somatosensory)    Sensory Assessment (Somatosensory) bilateral UE;sensation intact  -     Bilateral UE Sensory Assessment general sensation;light touch awareness;intact  -     Sensory Assessment denied any numbness or tingling and able to recognize LT stimuli as intact and symmetrical at [facility] 08/15/24 1314          Vision Assessment/Intervention    Visual Impairment/Limitations corrective lenses full-time  -     Vision Assessment Comment denies any acute changes to vision  -JY       Row Name 08/15/24 1314          Range of Motion Comprehensive    General Range of Motion bilateral upper extremity ROM WFL  -JY       Row Name 08/15/24 1314          Strength Comprehensive (MMT)    General Manual Muscle Testing (MMT) Assessment no strength deficits identified  -     Comment, General Manual Muscle Testing (MMT) Assessment 5/5 MMS at [facility] 08/15/24 1314           Motor Skills    Motor Skills functional endurance  -JY     Functional Endurance demonstrates functional activity tolerance toward ADLs, related mobility; more limited by pain, safety  -JY       Row Name 08/15/24 1314          Balance    Balance Assessment sitting static balance;sitting dynamic balance;standing static balance;standing dynamic balance  -JY     Static Sitting Balance supervision  -JY     Dynamic Sitting Balance standby assist  -JY     Position, Sitting Balance unsupported;sitting in chair  -JY     Static Standing Balance contact guard  -JY     Dynamic Standing Balance contact guard;verbal cues  -JY     Position/Device Used, Standing Balance supported;walker, front-wheeled  -JY     Balance Interventions sitting;standing;static;dynamic;sit to stand;supported;occupation based/functional task  -JY     Comment, Balance no overt LOB during seated or standing tasks; initially increased time to bear full weight through LLE in standing to be most upright prior to stepping  -JY               User Key  (r) = Recorded By, (t) = Taken By, (c) = Cosigned By      Initials Name Provider Type    Gerri Ribera OT Occupational Therapist                   Goals/Plan       Row Name 08/15/24 1326          Transfer Goal 1 (OT)    Activity/Assistive Device (Transfer Goal 1, OT) sit-to-stand/stand-to-sit;bed-to-chair/chair-to-bed;toilet;commode, bedside without drop arms;walker, rolling  -JY     [place] Level/Cues Needed (Transfer Goal 1, OT) standby assist;verbal cues required  -JY     Time Frame (Transfer Goal 1, OT) long term goal (LTG);by discharge  -JY     Progress/Outcome (Transfer Goal 1, OT) new goal  -JY       Row Name 08/15/24 1326          Dressing Goal 1 (OT)    Activity/Device (Dressing Goal 1, OT) lower body dressing;other (see comments)  d/d LB garaments with LH AE PRN w/ close monitoring of nerve cath  -JY     [place]/Cues Needed (Dressing Goal 1, OT) standby assist;verbal cues required  -JY      Time Frame (Dressing Goal 1, OT) short term goal (STG);2 days  -JY     Progress/Outcome (Dressing Goal 1, OT) new goal  -JY       Row Name 08/15/24 1326          Toileting Goal 1 (OT)    Activity/Device (Toileting Goal 1, OT) adjust/manage clothing;perform perineal hygiene;commode;commode, bedside without drop arms;grab bar/safety frame;raised toilet seat  -JY     [place] Level/Cues Needed (Toileting Goal 1, OT) standby assist  -JY     Time Frame (Toileting Goal 1, OT) long term goal (LTG);by discharge  -JY     Strategies/Barriers (Toileting Goal 1, OT) w/ close monitoring of nerve cath during clothing mgmt  -JY     Progress/Outcome (Toileting Goal 1, OT) new goal  -JY       Row Name 08/15/24 1326          Therapy Assessment/Plan (OT)    Planned Therapy Interventions (OT) activity tolerance training;adaptive equipment training;BADL retraining;functional balance retraining;occupation/activity based interventions;patient/caregiver education/training;ROM/therapeutic exercise;strengthening exercise;transfer/mobility retraining  -JY               User Key  (r) = Recorded By, (t) = Taken By, (c) = Cosigned By      Initials Name Provider Type    Gerri Ribera OT Occupational Therapist                   Clinical Impression       Row Name 08/15/24 1318          Pain Assessment    Pretreatment Pain Rating 7/10  -JY     Posttreatment Pain Rating 7/10  -JY     Pain Location - Side/Orientation Left  -JY     Pain Location generalized  -JY     Pain Location - knee  -JY     Pre/Posttreatment Pain Comment pain reported anterior, posterior, medial and laterally with most significant anteriorly and soreness reported at L thigh, RN aware and managing; pt aware of bolus options  -JY     Pain Intervention(s) Repositioned;Ambulation/increased activity;Rest;Cold applied;Nursing Notified  -JY       Row Name 08/15/24 1318          Plan of Care Review    Plan of Care Reviewed With patient  -JY     Progress no change  OT IE  -JY      Outcome Evaluation OT evaluation completed. Postsurgically, pt presents with minimal decrease in I in ADLs, related t/fs, mobility limited by mild balance deficits, decreased distal reach with increased pain at L LE and decreased ROM impacting I in LB ADLs, mild fatigue with more dynamic demands and safety awareness deficits of which improved with training. Pt received UIC and req'd CGA for STS at recliner and during in room ADL related mobility with FWW support and intermittent cues on optimal AD mgmt and safe turning, pivoting. OT issued LH AE for A in LB reach with pt demonstrating improved I in d/d socks w/ CGA with use. Pt demonstsrated difficulty ascending and descending from toilet requiring min A with need for elevated height and BUE support of which pt was recommended at home. Pt performance improved with use. Recommend IPOT POC and home w/ A and OPPT at d/c when medically ready.  -JY       Row Name 08/15/24 1318          Therapy Assessment/Plan (OT)    Patient/Family Therapy Goal Statement (OT) to return to [facility]  -JY     Rehab Potential (OT) good, to achieve stated therapy goals  -JY     Criteria for Skilled Therapeutic Interventions Met (OT) yes;skilled treatment is necessary  -JY     Therapy Frequency (OT) daily  -JY     Predicted Duration of Therapy Intervention (OT) 2 days  -JY       Row Name 08/15/24 1318          Therapy Plan Review/Discharge Plan (OT)    Equipment Needs Upon Discharge (OT) dressing equipment;bathing equipment  -JY     Anticipated Discharge Disposition (OT) home with assist;home with outpatient therapy services  -J       Row Name 08/15/24 1318          Vital Signs    Pre Systolic BP Rehab 144  -JY     Pre Treatment Diastolic BP 88  -JY     Pre SpO2 (%) 95  -JY     O2 Delivery Pre Treatment room air  -JY     O2 Delivery Intra Treatment room air  -JY     Post SpO2 (%) 95  -JY     O2 Delivery Post Treatment room air  -JY     Pre Patient Position Sitting  -JY     Intra Patient Position  Standing  -JY     Post Patient Position Sitting  -JY       Row Name 08/15/24 1318          Positioning and Restraints    Pre-Treatment Position sitting in chair/recliner  -JY     Post Treatment Position chair  -JY     In Chair notified nsg;reclined;call light within reach;encouraged to call for assist;exit alarm on;waffle cushion;legs elevated;L heel elevated;compression device  ice pack to L knee, nerve cath intact  -JY               User Key  (r) = Recorded By, (t) = Taken By, (c) = Cosigned By      Initials Name Provider Type    Gerri Ribera, DEVONTE Occupational Therapist                   Outcome Measures       Row Name 08/15/24 1329          How much help from another is currently needed...    Putting on and taking off regular lower body clothing? 3  -JY     Bathing (including washing, rinsing, and drying) 3  -JY     Toileting (which includes using toilet bed pan or urinal) 3  -JY     Putting on and taking off regular upper body clothing 4  -JY     Taking care of personal grooming (such as brushing teeth) 4  -JY     Eating meals 4  -JY     AM-PAC 6 Clicks Score (OT) 21  -JY       Row Name 08/15/24 1028          How much help from another person do you currently need...    Turning from your back to your side while in flat bed without using bedrails? 3  -HW     Moving from lying on back to sitting on the side of a flat bed without bedrails? 3  -HW     Moving to and from a bed to a chair (including a wheelchair)? 3  -HW     Standing up from a chair using your arms (e.g., wheelchair, bedside chair)? 3  -HW     Climbing 3-5 steps with a railing? 3  -HW     To walk in hospital room? 3  -HW     AM-PAC 6 Clicks Score (PT) 18  -HW     Highest Level of Mobility Goal 6 --> Walk 10 steps or more  -       Row Name 08/15/24 1329 08/15/24 1028       Functional Assessment    Outcome Measure Options AM-PAC 6 Clicks Daily Activity (OT)  -JY AM-PAC 6 Clicks Basic Mobility (PT);PADD  -HW              User Key  (r) = Recorded  By, (t) = Taken By, (c) = Cosigned By      Initials Name Provider Type    Gerri Ribera, OT Occupational Therapist    Anusha Sifuentes PT Physical Therapist                    Occupational Therapy Education       Title: PT OT SLP Therapies (In Progress)       Topic: Occupational Therapy (Not Started)       Point: ADL training (Not Started)       Description:   Instruct learner(s) on proper safety adaptation and remediation techniques during self care or transfers.   Instruct in proper use of assistive devices.                  Learner Progress:  Not documented in this visit.              Point: Home exercise program (Not Started)       Description:   Instruct learner(s) on appropriate technique for monitoring, assisting and/or progressing therapeutic exercises/activities.                  Learner Progress:  Not documented in this visit.              Point: Precautions (Not Started)       Description:   Instruct learner(s) on prescribed precautions during self-care and functional transfers.                  Learner Progress:  Not documented in this visit.              Point: Body mechanics (Not Started)       Description:   Instruct learner(s) on proper positioning and spine alignment during self-care, functional mobility activities and/or exercises.                  Learner Progress:  Not documented in this visit.                                  OT Recommendation and Plan  Planned Therapy Interventions (OT): activity tolerance training, adaptive equipment training, BADL retraining, functional balance retraining, occupation/activity based interventions, patient/caregiver education/training, ROM/therapeutic exercise, strengthening exercise, transfer/mobility retraining  Therapy Frequency (OT): daily  Plan of Care Review  Plan of Care Reviewed With: patient  Progress: no change (OT IE)  Outcome Evaluation: OT evaluation completed. Postsurgically, pt presents with minimal decrease in I in ADLs, related t/fs,  mobility limited by mild balance deficits, decreased distal reach with increased pain at L LE and decreased ROM impacting I in LB ADLs, mild fatigue with more dynamic demands and safety awareness deficits of which improved with training. Pt received UIC and req'd CGA for STS at recliner and during in room ADL related mobility with FWW support and intermittent cues on optimal AD mgmt and safe turning, pivoting. OT issued LH AE for A in LB reach with pt demonstrating improved I in d/d socks w/ CGA with use. Pt demonstsrated difficulty ascending and descending from toilet requiring min A with need for elevated height and BUE support of which pt was recommended at home. Pt performance improved with use. Recommend IPOT POC and home w/ A and OPPT at d/c when medically ready.     Time Calculation:   Evaluation Complexity (OT)  Review Occupational Profile/Medical/Therapy History Complexity: brief/low complexity  Assessment, Occupational Performance/Identification of Deficit Complexity: 1-3 performance deficits  Clinical Decision Making Complexity (OT): problem focused assessment/low complexity  Overall Complexity of Evaluation (OT): low complexity     Time Calculation- OT       Row Name 08/15/24 1332 08/15/24 1028          Time Calculation- OT    OT Start Time 1008  -JY --     OT Received On 08/15/24  -JY --     OT Goal Re-Cert Due Date 08/25/24  -JY --        Timed Charges    55552 - Gait Training Minutes  -- 12 -HW     52114 - OT Therapeutic Activity Minutes 19  -JY --     67949 - OT Self Care/Mgmt Minutes 20  -JY --        Untimed Charges    OT Eval/Re-eval Minutes 49  -JY --        Total Minutes    Timed Charges Total Minutes 39  -JY 12  -HW     Untimed Charges Total Minutes 49  -JY --      Total Minutes 88  -JY 12  -HW               User Key  (r) = Recorded By, (t) = Taken By, (c) = Cosigned By      Initials Name Provider Type    Gerri Ribera OT Occupational Therapist    HW Anusha Whitt, PT Physical Therapist                   Therapy Charges for Today       Code Description Service Date Service Provider Modifiers Qty    95557892600  OT THERAPEUTIC ACT EA 15 MIN 8/15/2024 Gerri Barcenas OT GO 1    [id number]  OT SELF CARE/MGMT/TRAIN EA 15 MIN 8/15/2024 Gerri Barcenas OT GO 2    [id number]  OT EVAL LOW COMPLEXITY 4 8/15/2024 Gerri Barcenas OT GO 1                 Gerri Barcenas OT  8/15/2024

## 2024-08-15 NOTE — ANESTHESIA POSTPROCEDURE EVALUATION
Patient: Patricio Iverson    Procedure Summary       Date: 08/14/24 Room / [facility]:  [facility]    Anesthesia Start: 1354 Anesthesia Stop: 1700    Procedure: TOTAL KNEE ARTHROPLASTY WITH SHAKIR ROBOT LEFT (Left: Knee) Diagnosis:       Primary osteoarthritis of left knee      (Primary osteoarthritis of left knee [M17.12])    Surgeons: Rolando Mosqueda MD Provider: Abdoulaye Barcenas MD    Anesthesia Type: spinal ASA Status: 3            Anesthesia Type: spinal    Vitals  Vitals Value Taken Time   /83 08/14/24 1930   Temp 97.9 °F (36.6 °C) 08/14/24 1930   Pulse 64 08/14/24 1944   Resp 16 08/14/24 1930   SpO2 94 % 08/14/24 1944   Vitals shown include unfiled device data.        Post Anesthesia Care and Evaluation    Patient location during evaluation: PACU  Patient participation: complete - patient participated  Level of consciousness: awake  Pain score: 2  Pain management: adequate    Airway patency: patent  Anesthetic complications: No anesthetic complications  PONV Status: none  Cardiovascular status: acceptable  Respiratory status: acceptable  Hydration status: acceptable

## 2024-08-15 NOTE — PROGRESS NOTES
Orthopaedic Surgery Progress Note      LOS: 0 days   Patient Care Team:  Sarbjit Rebolledo MD as PCP - General (Internal Medicine)    POD 1    Subjective     Interval History:   Patient doing well this morning.  Sore in the thigh and sore in the back of the knee.  Denies chest pain or shortness of breath.    Objective     Vital Signs:  Temp (24hrs), Av.6 °F (36.4 °C), Min:97 °F (36.1 °C), Max:98.7 °F (37.1 °C)    /85 (BP Location: Left arm, Patient Position: Lying)   Pulse 72   Temp 98.4 °F (36.9 °C) (Oral)   Resp 18   SpO2 92%     Labs:  Lab Results (last 24 hours)       Procedure Component Value Units Date/Time    Basic Metabolic Panel [[id number]]  (Abnormal) Collected: 08/15/24 0501    Specimen: Blood Updated: 08/15/24 0655     Glucose 103 mg/dL      BUN 12 mg/dL      Creatinine 0.86 mg/dL      Sodium 138 mmol/L      Potassium 4.5 mmol/L      Chloride 102 mmol/L      CO2 25.0 mmol/L      Calcium 8.4 mg/dL      BUN/Creatinine Ratio 14.0     Anion Gap 11.0 mmol/L      eGFR 101.0 mL/min/1.73     Narrative:      GFR Normal >60  Chronic Kidney Disease <60  Kidney Failure <15      CBC & Differential [[id number]]  (Abnormal) Collected: 08/15/24 0501    Specimen: Blood Updated: 08/15/24 0634    Narrative:      The following orders were created for panel order CBC & Differential.  Procedure                               Abnormality         Status                     ---------                               -----------         ------                     CBC Auto Differential[[id number]]        Abnormal            Final result                 Please view results for these tests on the individual orders.    CBC Auto Differential [[id number]]  (Abnormal) Collected: 08/15/24 0501    Specimen: Blood Updated: 08/15/24 0634     WBC 10.98 10*3/mm3      RBC 4.28 10*6/mm3      Hemoglobin 14.3 g/dL      Hematocrit 42.6 %      MCV 99.5 fL      MCH 33.4 pg      MCHC 33.6 g/dL      RDW 13.1 %      RDW-SD 47.7 fl       MPV 9.8 fL      Platelets 184 10*3/mm3      Neutrophil % 80.8 %      Lymphocyte % 9.5 %      Monocyte % 9.0 %      Eosinophil % 0.0 %      Basophil % 0.2 %      Immature Grans % 0.5 %      Neutrophils, Absolute 8.87 10*3/mm3      Lymphocytes, Absolute 1.04 10*3/mm3      Monocytes, Absolute 0.99 10*3/mm3      Eosinophils, Absolute 0.00 10*3/mm3      Basophils, Absolute 0.02 10*3/mm3      Immature Grans, Absolute 0.06 10*3/mm3      nRBC 0.0 /100 WBC     POC Glucose Once [[id number]]  (Normal) Collected: 08/14/24 1208    Specimen: Blood Updated: 08/14/24 1213     Glucose 95 mg/dL             Physical Exam:  EHL, FHL, gastroc soleus, and tibialis anterior are intact  Toes are pink and warm  Surgical dressing is in place  Palpable dorsalis pedis pulse  Calf is soft and nontender    Postoperative x-ray has been reviewed and looks acceptable    Assessment & Plan     Postoperative day number 1 status post left total knee arthroplasty.    Labs: Hematocrit 43, platelets 184,000, creatinine 0.86    Pain Control: Good overall.  Having some posterior knee pain.  Told the patient to have a conversation with anesthesia pain service regarding an iPAQ block if the pain gets too severe.    PT and OT     DVT prophylaxis: Baby aspirin twice daily beginning today    Discharge planning: Anticipate discharge home today.  Meds to bed program.    Upon discharge, patient will need follow-up with me in 3 weeks' time.  They will need outpatient physical therapy at [facility] in [place] for physical therapy.  Standard Exofin Fusion dressing care instructions.  Do not remove.  DME per case management.    Admission Status:  I believe this patient meets INPATIENT status due to the need for care which can only be reasonably provided in a hospital setting such as aggressive/expedited ancillary services and/or consultation services, the necessity for IV medications, close physician monitoring and/or the possible need for procedures.   In such, I feel patient’s risk for adverse outcomes and need for care warrant INPATIENT evaluation and predict the patient’s care encounter to likely last beyond 2 midnights.        Rolando Mosqueda MD  08/15/24  07:38 EDT

## 2024-08-15 NOTE — PROGRESS NOTES
[facility]    Acute pain service Inpatient Progress Note    Patient Name: Patricio Iverson  :  1965  MRN:  [id number]        Acute Pain  Service Inpatient Progress Note:    Analgesia:Fair  Pain Score:8/10 (Posterior pain, primarily. Some anterior pain.)  LOC: alert and awake  Resp Status: room air  Cardiac: VS stable  Side Effects:None  Catheter Site:clean, dressing intact and dry  Cath type: peripheral nerve cath(InfuSystem)  Volume: 1mL,8ml, 8ml InfuSystem Pump.  Dosing/Volume: ropivacaine 0.2%  Catheter Plan:Catheter to remain Insitu and Continue catheter infusion rate unchanged  Comments:    Plan for popliteal plexus block to relieve posterior knee pain.  Bolused cath with 5 ml of 0.5% ropvicaine for anterior knee pain.

## 2024-08-15 NOTE — DISCHARGE INSTRUCTIONS
[facility] COLD THERAPY - PATIENT INSTRUCTION SHEET    Cold Compression Therapy for your comfort and rehabilitation  Your caregivers want you to be productive in your rehab and comfortable during your stay. In keeping with those goals, you will be receiving an SMI Cold Therapy Wrap to help ease post-operative pain and swelling that might keep you from getting back on track! Your SMI Cold Therapy Wrap is effective and simple-to-use, and you will be encouraged to apply it throughout your hospital stay and at home through the duration of your recovery.    When you are ready to go home  Be sure to take your SMI Cold Therapy Wrap and both sets of Gel Bags with you for continued comfort and use throughout your rehabilitation. If you don't already have them, ask your nurse or aide to retrieve your SMI Gel Bags from the patient freezer.    Home use precautions  Always follow your medical professional's application instructions upon discharge. Your SMI Cold Therapy Wrap and Gel Bags are designed to last for months following your surgery. Never heat the Gel Bags unless specified by your healthcare provider. Supervision is advised when using this product on children or geriatric patients. To avoid danger of suffocation, please keep the outer plastic packaging away from children & pets.    Cold Therapy Instructions  Place Gel Bags in a freezer set ¾ of the way to max temperature for at least (4) hours. For best results, lay the Gel Bags flat and [id number] in the freezer. Once frozen, slide Gel Bags into the gel pouch and secure your wrap to the affected area with the straps.  Gel wraps that have been stored in a freezer for an extended period of time may require a (10) minute period of softening up in a room temperature environment before application.  The gel pouch acts as a protective barrier. NEVER place frozen bags directly onto skin, as this may cause frostbite injury.  The SMI Cold Therapy Wrap is designed to be able to be  worm while ambulating. The compression straps can be secured well enough so that the Wrap won't fall off while moving.  Wrap Application Videos can be viewed at [employer].  An additional protective barrier such as clothing, a washcloth, hand-towel or pillowcase may be used during prolonged treatment applications.  The Gel-Pouch and Wrap are both Latex-Free and the Gel Bag ingredients are non toxic.    [facility] Wrap care instructions  The [facility] Cold Therapy Wrap may be hand washed and hung to dry when needed.    [facility] re-order information  Additional [facility] body specific wraps and/or Gel Bags can be re-ordered from [employer] or call [employer] ([phone])          InfuBLOCK - Patient Information    What is a pain pump?  The InfuBLOCK pump delivers post-operative, non-narcotic, numbing medication to the nerve near the surgical site for pain relief.     Where can I find information about my pain pump?           For more information about your pain pump, scan the QR code.  For additional patient resources, visit [email or web address]-pain-management.                                                                                               While your physician is your primary source for information about your treatment there may be times during your treatment that you need assistance with your infusion pump.     If you need assistance take the following steps:    The [employer] Nursing Hotline is Here for You 24/7.  Please call [phone] for the following concerns or complications:    Answers to questions about your infusion pump                 Tubing disconnect  Assistance with pump alarms                                                      Dislodged catheter  Excessive leakage noted from pump                                         Inadequate pain control    2.   [facility] Anesthesia Acute Pain Service: [phone] is available 24/7 for any further needs or concerns about  medication or pain control.           Nerve Catheter Removal Instructions  When your device is empty:    Remove your catheter by pulling the dressing off slowly (like you would remove a regular bandage). The catheter should pull right out of the skin.  Check that the BLUE tip is intact.                                                                                     If the catheter is stuck, reposition your   extremity and pull slowly until removed.  *If catheter is HURTING and WON'T come out, stop and call [phone] for further assistance.    Remove medication bag from the black carrying case.  Cut the tubing on right and left side of pump, and discard the medication bag and tubing into garbage.  Place the pump and black carrying case into the plastic bag and then place this into the return box.  Seal box with blue stickers and return to [employer] postal service. THIS IS PRE-PAID POSTAGE.

## 2024-08-15 NOTE — CASE MANAGEMENT/SOCIAL WORK
Discharge Planning Assessment  [facility]     Patient Name: Patricio Iverson  MRN: [id number]  Today's Date: 8/15/2024    Admit Date: 8/14/2024    Plan: Home with significant other's assistance, [facility], and a bedside commode from [facility]   Discharge Plan       Row Name 08/15/24 1221       Plan    Plan Home with significant other's assistance, [facility], and a bedside commode from [facility]    Patient/Family in Agreement with Plan yes    Plan Comments Met with Mr. Iverson, at the bedside, for discharge planning.    Mr. Iverson lives with his significant other, Salina, in [place].   He has been evaluated by PT and is ambulating with a rolling walker.  He has a rolling walker at home already.  He requested a bedside commode for home use.  Gave Mr. Iverson a prescription for a bedside commode at his request.  The patient [facility] [place]    The patient is a pre referral to [facility] in [place].  He states that he already has an appointment scheduled for PT.    PCP is Sarbjit Rebolledo.  Insurance is [id number].  ACP documents filed.    DC plan is to return home with Salina's assistance, as needed.  Salina will transport Mr. Iverson home when discharged.    CM will follow up.    Final Discharge Disposition Code 01 - home or self-care                  Continued Care and Services - Admitted Since 8/14/2024       Therapy       Service Provider Request Status Selected Services Address Phone Fax Patient Preferred    [facility]  [id number] [phone] --                  Expected Discharge Date and Time       Expected Discharge Date Expected Discharge Time    Aug 15, 2024           Yancy Khan, ELAINE

## 2024-08-15 NOTE — ADDENDUM NOTE
Addendum  created 08/15/24 0847 by Kody Laws CRNA    Clinical Note Signed, Intraprocedure Blocks edited, SmartForm saved

## 2024-08-15 NOTE — ANESTHESIA PROCEDURE NOTES
Popiteal plexus block      Patient reassessed immediately prior to procedure    Patient location during procedure: floor  Start time: 8/15/2024 8:31 AM  Stop time: 8/15/2024 8:36 AM  Reason for block: at surgeon's request and post-op pain management  Performed by  CRNA/CAA: Kody Laws, CRNA  Assisted by: Wanda Crenshaw RN  Preanesthetic Checklist  Completed: patient identified, IV checked, site marked, risks and benefits discussed, surgical consent, monitors and equipment checked, pre-op evaluation and timeout performed  Prep:  Pt Position: supine  Sterile barriers:mask, cap, washed/disinfected hands and gloves  Prep: ChloraPrep  Patient monitoring: blood pressure monitoring, continuous pulse oximetry and EKG  Procedure    Sedation: no  Performed under: local infiltration  Guidance:ultrasound guided    ULTRASOUND INTERPRETATION.  Using ultrasound guidance a 20 G gauge needle was placed in close proximity to the nerve, at which point, under ultrasound guidance anesthetic was injected in the area of the nerve and spread of the anesthesia was seen on ultrasound in close proximity thereto.  There were no abnormalities seen on ultrasound; a digital image was taken; and the patient tolerated the procedure with no complications. Images:still images obtained, printed/placed on chart    Laterality:left  Anesthesia block type: Popliteal Plexus.  Injection Technique:single-shot  Needle Type:echogenic and short-bevel  Needle Gauge:20 G  Resistance on Injection: none    Medications Used: dexamethasone sodium phosphate injection - Injection   2 mg - 8/15/2024 8:36:00 AM  bupivacaine PF (MARCAINE) 0.25 % injection - Injection   30 mL - 8/15/2024 8:36:00 AM      Post Assessment  Injection Assessment: negative aspiration for heme, no paresthesia on injection and incremental injection  Patient Tolerance:comfortable throughout block  Complications:no  Additional Notes  A high-frequency linear transducer, with sterile cover, was  "placed on the anterior mid-thigh (between the anterior superior iliac spine and patella). The transducer was then moved medially to identify the Sartorius muscle (Brandon), Vastus Medialis muscle (VMM), Superficial Femoral Artery (SFA) and Vein. The transducer was then moved caudad to position where the SFA is distal and posterior to the Brandon (Adductor Hiatus). The insertion site was prepped and draped in sterile fashion. Skin and cutaneous tissue was infiltrated with 2-5 ml of 1% Lidocaine. Using ultrasound-guidance, a 20-gauge B-Vásquez 4\" Ultraplex 360 non-stimulating echogenic needle was advanced in plane from lateral to medial. Preservative-free normal saline was utilized for hydro-dissection of tissue, and to confirm needle placement at the 12 o'clock position to the artery. Local anesthetic in incremental 3-5 ml injections. Aspiration every 5 ml to prevent intravascular injection. Injection was completed with negative aspiration of blood and negative intravascular injection. Injection pressures were normal with minimal resistance.   Performed by: Kody Laws, CRNA            "

## 2024-09-05 ENCOUNTER — OFFICE VISIT (OUTPATIENT)
Dept: ORTHOPEDIC SURGERY | Facility: CLINIC | Age: 59
End: 2024-09-05
Payer: COMMERCIAL

## 2024-09-05 VITALS — TEMPERATURE: 97.1 F

## 2024-09-05 DIAGNOSIS — Z96.652 STATUS POST TOTAL LEFT KNEE REPLACEMENT: Primary | ICD-10-CM

## 2024-09-05 PROCEDURE — 99024 POSTOP FOLLOW-UP VISIT: CPT | Performed by: ORTHOPAEDIC SURGERY

## 2024-09-05 NOTE — PROGRESS NOTES
[facility] Orthopaedic Surgery Clinic Note        Subjective     Post-op (3 weeks s/p TOTAL KNEE ARTHROPLASTY WITH SHAKIR PAGAN LEFT DOS 8/14/24/)       HPI    Patricio Iverson is a 58 y.o. male. Patient is 3 weeks out from press fit L TKA on 8/14/24.  Pt doing better.  Had a rough first week.  Denies chest pain or SOB.            Objective      Physical Exam  Temp 97.1 °F (36.2 °C)     There is no height or weight on file to calculate BMI.        Ortho Exam      Lower Extremity:     Inspection and Palpation:      Left knee:  Calf:  Soft and non tender  Pulses:  2+  Ecchymosis:  None  Warmth:  Appropriate  Incision:  Clean, dry, and intact.  Healing appropriately  Assistive device:  none    ROM:  Left:  Extension:5    Flexion:100      Functional Testing:  Left:  Straight Leg Raise: 5/5       Imaging Reviewed and Interpreted:  Imaging Results (Last 24 Hours)       Procedure Component Value Units Date/Time    XR Knee 3+ View With Secor Left [[id number]] Resulted: 09/05/24 0926     Updated: 09/05/24 0926    Narrative:      Knee X-ray    Indication: status-post TKA    Study:  AP, Lateral, and Sunrise views of Left knee    Comparison: Left knee 8/14/2024    Findings:  No signs of acute fracture are visualized  No signs of loosening are appreciated  Components are well aligned    Impression:  Status post Left press-fit total knee arthroplasty. No signs   of loosening or fracture.                   Assessment    Assessment:  1. Status post total left knee replacement        Plan:  3-week status post press-fit left TKA--continue physical therapy.  Patient tells me he is good on pain medication.  Taking Norco 10 as needed.  I will see him back in 3 weeks to reassess range of motion.  Overall he is doing quite well.      Rolando Mosqueda MD  09/05/24  09:32 EDT      Dictated Utilizing Dragon Dictation.

## 2024-09-10 DIAGNOSIS — M17.12 PRIMARY OSTEOARTHRITIS OF LEFT KNEE: Primary | ICD-10-CM

## 2024-10-01 ENCOUNTER — OFFICE VISIT (OUTPATIENT)
Dept: ORTHOPEDIC SURGERY | Facility: CLINIC | Age: 59
End: 2024-10-01
Payer: COMMERCIAL

## 2024-10-01 DIAGNOSIS — Z96.652 STATUS POST TOTAL LEFT KNEE REPLACEMENT: Primary | ICD-10-CM

## 2024-10-01 PROCEDURE — 99024 POSTOP FOLLOW-UP VISIT: CPT | Performed by: ORTHOPAEDIC SURGERY

## 2024-11-12 ENCOUNTER — OFFICE VISIT (OUTPATIENT)
Dept: ORTHOPEDIC SURGERY | Facility: CLINIC | Age: 59
End: 2024-11-12
Payer: COMMERCIAL

## 2024-11-12 DIAGNOSIS — Z96.652 STATUS POST TOTAL LEFT KNEE REPLACEMENT: Primary | ICD-10-CM

## 2024-11-12 PROCEDURE — 99024 POSTOP FOLLOW-UP VISIT: CPT | Performed by: ORTHOPAEDIC SURGERY

## 2024-11-12 NOTE — PROGRESS NOTES
[facility] [place]        Subjective     Follow-up (6 week follow up- 3 month s/p TOTAL KNEE ARTHROPLASTY WITH SHAKIR PAGAN LEFT (DOS 8/14/24))       FADIA Iverson is a 59 y.o. male.  Patient is here today now 3 months out from press-fit left TKA on 8/14/2024.  Is doing pretty well overall.  Still having little bit of soreness.          Objective      Physical Exam  There were no vitals taken for this visit.    There is no height or weight on file to calculate BMI.        Ortho Exam    Lower Extremity:     Inspection and Palpation:      Left knee:  Calf:  Soft and non tender  Effusion:  None  Pulses:  2+  Warmth:  None  Incision:  Healed     ROM:  Left:  Extension:10    Flexion:120      Deformities/Malalignments/Discrepancies:    Left:  none    Functional Testing:  Left:  Straight Leg Raise: 5/5  Patella Tracking:  Normal      Imaging Reviewed and Interpreted:  Imaging Results (Last 24 Hours)       Procedure Component Value Units Date/Time    XR Knee 3+ View With Coburn Left [[id number]] Resulted: 11/12/24 1200     Updated: 11/12/24 1200    Narrative:      Knee X-ray    Indication: status-post TKA    Study:  AP, Lateral, and Sunrise views of Left knee    Comparison: Left knee 9/5/2024    Findings:  No signs of acute fracture are visualized  No signs of loosening are appreciated  Components are well aligned    Impression:  Status post Left press-fit total knee arthroplasty. No signs   of loosening or fracture.                Assessment    Assessment:  1. Status post total left knee replacement        Plan:  Status post left TKA--radiographs are reassuring today.  Having good incorporation.  I want to see him back in 3 months with an x-ray of his left knee.  Needs to continue to be vigilant about working on terminal extension.      Rolando Mosqueda MD  11/12/24  12:32 EST      Dictated Utilizing Dragon Dictation.

## 2024-12-27 ENCOUNTER — TELEPHONE (OUTPATIENT)
Dept: ORTHOPEDIC SURGERY | Facility: CLINIC | Age: 59
End: 2024-12-27

## 2024-12-27 ENCOUNTER — PATIENT MESSAGE (OUTPATIENT)
Dept: ORTHOPEDIC SURGERY | Facility: CLINIC | Age: 59
End: 2024-12-27
Payer: COMMERCIAL

## 2024-12-27 ENCOUNTER — OFFICE VISIT (OUTPATIENT)
Dept: ORTHOPEDIC SURGERY | Facility: CLINIC | Age: 59
End: 2024-12-27
Payer: COMMERCIAL

## 2024-12-27 VITALS
HEIGHT: 71 IN | SYSTOLIC BLOOD PRESSURE: 134 MMHG | DIASTOLIC BLOOD PRESSURE: 80 MMHG | WEIGHT: 220 LBS | BODY MASS INDEX: 30.8 KG/M2

## 2024-12-27 DIAGNOSIS — M19.011 ARTHRITIS OF RIGHT ACROMIOCLAVICULAR JOINT: Primary | ICD-10-CM

## 2024-12-27 DIAGNOSIS — M75.111 INCOMPLETE TEAR OF RIGHT ROTATOR CUFF, UNSPECIFIED WHETHER TRAUMATIC: ICD-10-CM

## 2024-12-27 RX ADMIN — LIDOCAINE HYDROCHLORIDE 1 ML: 10 INJECTION, SOLUTION EPIDURAL; INFILTRATION; INTRACAUDAL; PERINEURAL at 11:34

## 2024-12-27 RX ADMIN — TRIAMCINOLONE ACETONIDE 20 MG: 40 INJECTION, SUSPENSION INTRA-ARTICULAR; INTRAMUSCULAR at 11:34

## 2024-12-27 NOTE — TELEPHONE ENCOUNTER
"    Caller: Patricio Iverson    Relationship to patient: Self    Best call back number: [phone] (home)       Chief complaint: RIGHT SHOULDER    Type of visit: INJECTION    Requested date: TODAY     If rescheduling, when is the original appointment: NA     Additional notes:PATIENT DECLINED BEING SCHEDULED FOR THE FIRST AVAILABLE WITH THE HUB STATING THEY  (SPOUSE AND SIGNIFICANT OTHER) ARE \"FRIENDS\" WITH DR HURT AND WANT THE STAFF TO ASK HIM CAN HE SEE THE PATIENT TODAY.        "

## 2024-12-27 NOTE — PROGRESS NOTES
Procedure   - Medium Joint Arthrocentesis: R acromioclavicular on 12/27/2024 11:34 AM  Indications: pain  Details: 21 G needle, superior approach  Medications: 20 mg triamcinolone acetonide 40 MG/ML; 1 mL lidocaine PF 1% 1 %  Outcome: tolerated well, no immediate complications  Procedure, treatment alternatives, risks and benefits explained, specific risks discussed. Consent was given by the patient. Immediately prior to procedure a time out was called to verify the correct patient, procedure, equipment, support staff and site/side marked as required. Patient was prepped and draped in the usual sterile fashion.

## 2024-12-27 NOTE — PROGRESS NOTES
"                                                                    [facility] Office Visit - Bertha Zeng PA-C    Office Visit       Patient Name: Patricio Iverson    Chief Complaint:   Chief Complaint   Patient presents with    Right Shoulder - Pain       Referring Physician: No ref. provider found    History of Present Illness:   Patricio Iverson is a 59 y.o. male who presents with right shoulder pain. Progressively worsened for the last 2 weeks. Does not recall recent injury or trauma. He has had shoulder pain in the past and seen Dr. Mosqueda. Received relief from corticosteroid injection at the AC joint. Last AC injection on 4/9/24.     Left total knee arthroplasty 8/14/24      Subjective     Review of Systems     Past Medical History:   Past Medical History:   Diagnosis Date    Abdominal hernia     Anesthesia complication     \"hard to put to sleep\" duroing bronch    Arthritis     GERD (gastroesophageal reflux disease)     Headache     [place] (hard of hearing)     Hyperlipidemia     Hypertension     Hypertriglyceridemia     Migraine     Seasonal allergies     Sleep apnea with use of continuous positive airway pressure (CPAP)     compliant with machine     SOBOE (shortness of breath on exertion)     Wears glasses     Wears partial dentures     \"dental implant\"  tops and most bottom - crowns and implants  non-removable       Past Surgical History:   Past Surgical History:   Procedure Laterality Date    COLONOSCOPY      DENTAL PROCEDURE  08/2018    Dental Implants    ENDOSCOPY      EPIDURAL      injection in shoulder     HERNIA REPAIR      umbilical hernia on left side     KNEE SURGERY Right 2010    scope    SHOULDER SURGERY Right 2010    labrium and rotator cuff- couple times     TONSILLECTOMY      TOTAL HIP ARTHROPLASTY Right 03/19/2019    Procedure: TOTAL RIGHT HIP ARTHROPLASTY;  Surgeon: Anil Graham MD;  Location: [facility] to [facility];  Service: Orthopedics    TOTAL KNEE ARTHROPLASTY " Left 8/14/2024    Procedure: TOTAL KNEE ARTHROPLASTY WITH SHAKIR ROBOT LEFT;  Surgeon: Rolando Mosqueda MD;  Location: [facility];  Service: Robotics - Ortho;  Laterality: Left;    WISDOM TOOTH EXTRACTION      all 4 removed     WRIST SURGERY Right     Carpal Tunnel       Family History:   Family History   Problem Relation Age of Onset    No Known Problems Mother     Cancer Father        Social History:   Social History     Socioeconomic History    Marital status: Single   Tobacco Use    Smoking status: Never     Passive exposure: Past    Smokeless tobacco: Never   Vaping Use    Vaping status: Never Used   Substance and Sexual Activity    Alcohol use: Yes     Alcohol/week: 14.0 standard drinks of alcohol     Types: 7 Cans of beer, 7 Shots of liquor per week    Drug use: No    Sexual activity: Defer       Medications:   Current Outpatient Medications:     acetaminophen (TYLENOL) 650 MG 8 hr tablet, Take 1 tablet by mouth Every 8 (Eight) Hours., Disp: 90 tablet, Rfl: 0    albuterol sulfate  (90 Base) MCG/ACT inhaler, Inhale 2 puffs Every 6 (Six) Hours As Needed., Disp: , Rfl:     ALLERGY SERUM INJECTION, Inject  under the skin into the appropriate area as directed 1 (One) Time., Disp: , Rfl:     ALPRAZolam (XANAX) 0.5 MG tablet, Take 1 tablet by mouth At Night As Needed., Disp: , Rfl: 2    amLODIPine (NORVASC) 10 MG tablet, Take 0.5 tablets by mouth Daily., Disp: , Rfl: 3    Ascorbic Acid (Vitamin C) 500 MG capsule, Take 1 Capful by mouth Daily., Disp: , Rfl:     aspirin 81 MG EC tablet, Take 1 tablet by mouth 2 (Two) Times a Day., Disp: 84 tablet, Rfl: 0    atorvastatin (LIPITOR) 40 MG tablet, Take 1 tablet by mouth Every Night., Disp: , Rfl: 3    azelastine (ASTELIN) 0.1 % nasal spray, As Needed., Disp: , Rfl:     Breo Ellipta 100-25 MCG/ACT aerosol powder , Inhale 1 puff Daily., Disp: , Rfl:     butalbital-acetaminophen-caffeine (ORBIVAN) -40 MG capsule capsule, Take 1 capsule by mouth As Needed.,  Disp: , Rfl:     desloratadine (CLARINEX) 5 MG tablet, Take 1 tablet by mouth Daily., Disp: , Rfl: 5    docusate sodium (Colace) 100 MG capsule, Take 1 capsule by mouth 2 (Two) Times a Day As Needed for Constipation., Disp: 60 capsule, Rfl: 0    Dulera 200-5 MCG/ACT inhaler, Inhale 2 puffs 2 (Two) Times a Day., Disp: , Rfl:     fenofibrate 160 MG tablet, Take 1 tablet by mouth Every Night., Disp: , Rfl: 3    fluticasone (FLONASE) 50 MCG/ACT nasal spray, into the nostril(s) as directed by provider Daily., Disp: , Rfl:     Gemtesa 75 MG tablet, Take 1 tablet by mouth Daily., Disp: , Rfl:     Ginkgo Biloba 60 MG capsule, Daily., Disp: , Rfl:     HYDROcodone-acetaminophen (NORCO)  MG per tablet, Take 1 tablet by mouth Every 6 (Six) Hours As Needed. for pain, Disp: , Rfl:     ipratropium (ATROVENT) 0.06 % nasal spray, Administer 1 spray into the nostril(s) as directed by provider 2 (Two) Times a Day As Needed for Rhinitis., Disp: , Rfl:     levocetirizine (XYZAL) 5 MG tablet, Take 1 tablet by mouth Daily., Disp: , Rfl:     loratadine (CLARITIN) 10 MG tablet, Take 1 tablet by mouth Daily., Disp: , Rfl:     Melatonin 10 MG capsule, Take 2 capsules by mouth Every Night., Disp: , Rfl:     meloxicam (MOBIC) 15 MG tablet, Take 1 tablet by mouth Daily., Disp: 30 tablet, Rfl: 0    metoprolol tartrate (LOPRESSOR) 25 MG tablet, Take 1 tablet by mouth Every 12 (Twelve) Hours., Disp: , Rfl:     montelukast (SINGULAIR) 10 MG tablet, Take 1 tablet by mouth Every Night., Disp: , Rfl: 5    Multiple Vitamins-Minerals (MULTIVITAMIN ADULT PO), Take 1 tablet by mouth Daily., Disp: , Rfl:     Omega-3 Fatty Acids (FISH OIL) 1200 MG capsule delayed-release, Take 1 capsule by mouth 2 (Two) Times a Day. 1290, Disp: , Rfl:     omeprazole (priLOSEC) 20 MG capsule, Take 1 capsule by mouth Daily., Disp: , Rfl:     ondansetron (Zofran) 4 MG tablet, Take 1 tablet by mouth Every 8 (Eight) Hours As Needed for Nausea or Vomiting., Disp: 15 tablet,  "Rfl: 1    Pediatric Multivitamins-Iron (multivitamin chewable) chewable tablet, Chew 1 tablet Daily., Disp: , Rfl:     sildenafil (REVATIO) 20 MG tablet, TAKE UP TO 3 TABLETS BY MOUTH EVERY DAY AS NEEDED, Disp: , Rfl: 5    tamsulosin (FLOMAX) 0.4 MG capsule 24 hr capsule, Take 1 capsule by mouth Every Night., Disp: , Rfl:     Testosterone Cypionate (DEPOTESTOTERONE CYPIONATE) 200 MG/ML injection, INJECT 1 ML IM EVERY 14 DAYS-, Disp: , Rfl: 1    TURMERIC PO, Take  by mouth Daily., Disp: , Rfl:     Zinc 50 MG capsule, Daily., Disp: , Rfl:     Allergies:   Allergies   Allergen Reactions    Keflex [Cephalexin] Hives       I have reviewed and updated the following portions of the patient's history and review of systems: allergies, current medications, past family history, past medical history, past social history, past surgical history and problem list.    Objective      Vital Signs:   Vitals:    12/27/24 1117   BP: 134/80   Weight: 99.8 kg (220 lb)   Height: 180.3 cm (70.98\")       Ortho Exam:  General: no acute distress, comfortable  Vitals reviewed in chart    Musculoskeletal Exam:    SIDE: Right shoulder    Tenderness: AC joint    Range of motion measurements (degrees): Range of motion preserved with painful arc of motion  Pain with crossover  No skin changes  No evidence of septic joint      Results Review:   Narrative & Impression   Right Shoulder X-Ray 11/16/21     Indication: Pain     Study:  AP, axillary lateral, and scapular Y views     Comparison: Right shoulder 9/1/2020     Findings:  No acute fractures are visualized  No bony lesions are visualized.  Normal soft tissue appearance  AC joint: Severe hypertrophic joint space narrowing.  There are cystic changes noted at the distal clavicle.  Glenohumeral joint: Mild joint space narrowing  Acromion type: 2  Change at greater tuberosity consistent with chronic cuff pathology        Impression:    No acute bony abnormalities noted  Changes at greater tuberosity " consistent with chronic cuff pathology  Mild glenohumeral joint space narrowing  Severe hypertrophic AC joint space narrowing with cystic changes noted at the distal clavicle         Assessment / Plan      Assessment:  Diagnoses and all orders for this visit:    1. Arthritis of right acromioclavicular joint (Primary)    2. Incomplete tear of right rotator cuff, unspecified whether traumatic    Other orders  -     - Medium Joint Arthrocentesis: R acromioclavicular        Quality Metrics:   BMI:   BMI is >= 30 and <35. (Class 1 Obesity). The following options were offered after discussion;: weight loss educational material (shared in after visit summary)       Tobacco:   Patricio Iverson  reports that he has never smoked. He has been exposed to tobacco smoke. He has never used smokeless tobacco.           Plan:  Right shoulder pain progressively worsened at the AC joint for the last 2 weeks. Recommend corticosteroid injection today at the AC joint. May consider repeat MRI in the future if pain persists.   Recommend over the counter anti-inflammatories as needed.    I discussed with the patient the potential benefits of performing a therapeutic injection of the right AC joint  as well as potential risks including but not limited to infection, swelling, pain, bleeding, bruising, nerve/vessel damage, skin color changes, transient elevation in blood glucose levels, and fat atrophy. After informed consent and verifying correct patient, procedure site, and type of procedure, the area was prepped with Hibiclens, ethyl chloride was used to numb the skin. 1 cc of lidocaine and 1 mL of 40 mg triamcinolone acetonide 40 MG/ML and was injected into the site. The patient tolerated the procedure well. There were no complications.       Follow Up:   Follow-up with Dr. Mosqueda as scheduled        Bertha Zeng PA-C  [facility]       Dictated using Dragon Speech Recognition.

## 2024-12-27 NOTE — TELEPHONE ENCOUNTER
Patient is coming in this morning to see Bertha for he shoulder injection.  Jyoti Mcleod RT (R), ROT

## 2024-12-30 RX ORDER — TRIAMCINOLONE ACETONIDE 40 MG/ML
20 INJECTION, SUSPENSION INTRA-ARTICULAR; INTRAMUSCULAR
Status: COMPLETED | OUTPATIENT
Start: 2024-12-27 | End: 2024-12-27

## 2024-12-30 RX ORDER — LIDOCAINE HYDROCHLORIDE 10 MG/ML
1 INJECTION, SOLUTION EPIDURAL; INFILTRATION; INTRACAUDAL; PERINEURAL
Status: COMPLETED | OUTPATIENT
Start: 2024-12-27 | End: 2024-12-27

## 2024-12-30 NOTE — TELEPHONE ENCOUNTER
Spoke to patient who stated that the injection is really helping. I offered to have Meloxicam sent in to a pharmacy of his choosing. Patient stated he already takes Meloxicam and he will contact us again if he starts experiencing any more issues/pain.    Joanna Rivera MA

## 2025-05-22 ENCOUNTER — OFFICE VISIT (OUTPATIENT)
Dept: NEUROSURGERY | Facility: CLINIC | Age: 60
End: 2025-05-22
Payer: COMMERCIAL

## 2025-05-22 ENCOUNTER — HOSPITAL ENCOUNTER (OUTPATIENT)
Dept: GENERAL RADIOLOGY | Facility: HOSPITAL | Age: 60
Discharge: HOME | End: 2025-05-22
Admitting: NEUROLOGICAL SURGERY
Payer: COMMERCIAL

## 2025-05-22 ENCOUNTER — PREP FOR SURGERY (OUTPATIENT)
Dept: OTHER | Facility: HOSPITAL | Age: 60
End: 2025-05-22
Payer: COMMERCIAL

## 2025-05-22 VITALS — HEIGHT: 71 IN | TEMPERATURE: 97.7 F | BODY MASS INDEX: 33.77 KG/M2 | WEIGHT: 241.2 LBS

## 2025-05-22 DIAGNOSIS — R20.0 HAND NUMBNESS: ICD-10-CM

## 2025-05-22 DIAGNOSIS — G95.20 CERVICAL SPINAL CORD COMPRESSION: Primary | ICD-10-CM

## 2025-05-22 DIAGNOSIS — G95.20 CERVICAL SPINAL CORD COMPRESSION: ICD-10-CM

## 2025-05-22 DIAGNOSIS — M54.2 CERVICALGIA: Primary | ICD-10-CM

## 2025-05-22 PROBLEM — M50.022 CERVICAL DISC DISORDER AT C5-C6 LEVEL WITH MYELOPATHY: Status: ACTIVE | Noted: 2025-05-22

## 2025-05-22 PROCEDURE — 99204 OFFICE O/P NEW MOD 45 MIN: CPT | Performed by: NEUROLOGICAL SURGERY

## 2025-05-22 PROCEDURE — 72040 X-RAY EXAM NECK SPINE 2-3 VW: CPT

## 2025-05-22 RX ORDER — HYDROCODONE BITARTRATE AND ACETAMINOPHEN 7.5; 325 MG/1; MG/1
1 TABLET ORAL ONCE
Refills: 0 | OUTPATIENT
Start: 2025-05-22 | End: 2025-05-22

## 2025-05-22 RX ORDER — SODIUM CHLORIDE 0.9 % (FLUSH) 0.9 %
1-10 SYRINGE (ML) INJECTION AS NEEDED
OUTPATIENT
Start: 2025-05-22

## 2025-05-22 RX ORDER — GABAPENTIN 300 MG/1
300 CAPSULE ORAL 4 TIMES DAILY
COMMUNITY

## 2025-05-22 RX ORDER — CHLORHEXIDINE GLUCONATE 40 MG/ML
SOLUTION TOPICAL
Qty: 236 ML | Refills: 0 | Status: SHIPPED | OUTPATIENT
Start: 2025-05-22

## 2025-05-22 RX ORDER — SODIUM CHLORIDE 9 MG/ML
40 INJECTION, SOLUTION INTRAVENOUS AS NEEDED
OUTPATIENT
Start: 2025-05-22

## 2025-05-22 RX ORDER — MUPIROCIN 20 MG/G
OINTMENT TOPICAL
Qty: 22 G | Refills: 0 | Status: SHIPPED | OUTPATIENT
Start: 2025-05-22

## 2025-05-22 RX ORDER — ACETAMINOPHEN 325 MG/1
650 TABLET ORAL ONCE
OUTPATIENT
Start: 2025-05-22 | End: 2025-05-22

## 2025-05-22 RX ORDER — SODIUM CHLORIDE 0.9 % (FLUSH) 0.9 %
10 SYRINGE (ML) INJECTION EVERY 12 HOURS SCHEDULED
OUTPATIENT
Start: 2025-05-22

## 2025-05-22 RX ORDER — FAMOTIDINE 20 MG/1
20 TABLET, FILM COATED ORAL
OUTPATIENT
Start: 2025-05-22

## 2025-05-22 RX ORDER — IBUPROFEN 800 MG/1
800 TABLET, FILM COATED ORAL ONCE
OUTPATIENT
Start: 2025-05-22 | End: 2025-05-22

## 2025-05-22 NOTE — PROGRESS NOTES
NAME: RAMIRO GARZA   DOS: 2025  : 1965  PCP: Sarbjit Rebolledo MD    Chief Complaint:    Chief Complaint   Patient presents with    Neck Pain    Arm Pain     Bilateral Arm Pain         History of Present Illness:  59 y.o. male   I saw this 59-year-old gentleman with a history of extensive arthritic symptomatology most recently he has pulmonary issues and has been diagnosed with Aspergillus and asthma he has significant osteoarthritis and reports chronic history of axial neck pain most recently has had worsening right arm pain consistent with C6 radiculopathy into the thumb and index finger he also has Paroex visible left scapula and C7 type of intermittent radiculopathy and elbow pain he takes Xanax he does drink reasonably heavily and daily and he is on multiple medications for intermittent chronic arthritic symptomatology he has a history of obstructive sleep apnea on CPAP he is here for evaluation with his wife    PMHX  Allergies:  Allergies   Allergen Reactions    Keflex [Cephalexin] Hives     Medications    Current Outpatient Medications:     albuterol sulfate  (90 Base) MCG/ACT inhaler, Inhale 2 puffs Every 6 (Six) Hours As Needed., Disp: , Rfl:     ALLERGY SERUM INJECTION, Inject  under the skin into the appropriate area as directed 1 (One) Time., Disp: , Rfl:     ALPRAZolam (XANAX) 0.5 MG tablet, Take 1 tablet by mouth At Night As Needed., Disp: , Rfl: 2    amLODIPine (NORVASC) 10 MG tablet, Take 0.5 tablets by mouth Daily., Disp: , Rfl: 3    Ascorbic Acid (Vitamin C) 500 MG capsule, Take 1 Capful by mouth Daily., Disp: , Rfl:     atorvastatin (LIPITOR) 40 MG tablet, Take 1 tablet by mouth Every Night., Disp: , Rfl: 3    butalbital-acetaminophen-caffeine (ORBIVAN) -40 MG capsule capsule, Take 1 capsule by mouth As Needed., Disp: , Rfl:     Dulera 200-5 MCG/ACT inhaler, Inhale 2 puffs 2 (Two) Times a Day., Disp: , Rfl:     fenofibrate 160 MG tablet, Take 1 tablet by  "mouth Every Night., Disp: , Rfl: 3    fluticasone (FLONASE) 50 MCG/ACT nasal spray, into the nostril(s) as directed by provider Daily., Disp: , Rfl:     gabapentin (NEURONTIN) 300 MG capsule, Take 1 capsule by mouth 4 (Four) Times a Day., Disp: , Rfl:     Gemtesa 75 MG tablet, Take 1 tablet by mouth Daily., Disp: , Rfl:     Ginkgo Biloba 60 MG capsule, Daily., Disp: , Rfl:     HYDROcodone-acetaminophen (NORCO)  MG per tablet, Take 1 tablet by mouth Every 6 (Six) Hours As Needed. for pain, Disp: , Rfl:     levocetirizine (XYZAL) 5 MG tablet, Take 1 tablet by mouth Daily., Disp: , Rfl:     loratadine (CLARITIN) 10 MG tablet, Take 1 tablet by mouth Daily., Disp: , Rfl:     Melatonin 10 MG capsule, Take 2 capsules by mouth Every Night., Disp: , Rfl:     meloxicam (MOBIC) 15 MG tablet, Take 1 tablet by mouth Daily., Disp: 30 tablet, Rfl: 0    metoprolol tartrate (LOPRESSOR) 25 MG tablet, Take 1 tablet by mouth Every 12 (Twelve) Hours., Disp: , Rfl:     montelukast (SINGULAIR) 10 MG tablet, Take 1 tablet by mouth Every Night., Disp: , Rfl: 5    Multiple Vitamins-Minerals (MULTIVITAMIN ADULT PO), Take 1 tablet by mouth Daily., Disp: , Rfl:     omeprazole (priLOSEC) 20 MG capsule, Take 1 capsule by mouth Daily., Disp: , Rfl:     sildenafil (REVATIO) 20 MG tablet, TAKE UP TO 3 TABLETS BY MOUTH EVERY DAY AS NEEDED, Disp: , Rfl: 5    Testosterone Cypionate (DEPOTESTOTERONE CYPIONATE) 200 MG/ML injection, INJECT 1 ML IM EVERY 14 DAYS-, Disp: , Rfl: 1    TURMERIC PO, Take  by mouth Daily., Disp: , Rfl:     Zinc 50 MG capsule, Daily., Disp: , Rfl:   Past Medical History:  Past Medical History:   Diagnosis Date    Abdominal hernia     Anesthesia complication     \"hard to put to sleep\" duroing bronch    Arthritis     Asthma 1-24-25    Diagnosed of asthma first of last year    Cervical disc disorder     Claustrophobia     A little bit    GERD (gastroesophageal reflux disease)     Headache     [place] (hard of hearing)     " "Hyperlipidemia     Hypertension     Hypertriglyceridemia     Lumbosacral disc disease     About 20 years ago    Migraine     Seasonal allergies     Sleep apnea with use of continuous positive airway pressure (CPAP)     compliant with machine     SOBOE (shortness of breath on exertion)     Wears glasses     Wears partial dentures     \"dental implant\"  tops and most bottom - crowns and implants  non-removable     Past Surgical History:  Past Surgical History:   Procedure Laterality Date    COLONOSCOPY      DENTAL PROCEDURE  08/2018    Dental Implants    ENDOSCOPY      EPIDURAL      injection in shoulder     HERNIA REPAIR      umbilical hernia on left side     KNEE SURGERY Right 2010    scope    SHOULDER SURGERY Right 2010    labrium and rotator cuff- couple times     TONSILLECTOMY      TOTAL HIP ARTHROPLASTY Right 03/19/2019    Procedure: TOTAL RIGHT HIP ARTHROPLASTY;  Surgeon: Anil Graham MD;  Location:  [facility];  Service: Orthopedics    TOTAL KNEE ARTHROPLASTY Left 8/14/2024    Procedure: TOTAL KNEE ARTHROPLASTY WITH SHAKIR ROBOT LEFT;  Surgeon: Rolando Mosqueda MD;  Location:  [facility];  Service: Robotics - Ortho;  Laterality: Left;    WISDOM TOOTH EXTRACTION      all 4 removed     WRIST SURGERY Right     Carpal Tunnel     Social Hx:  Social History     Tobacco Use    Smoking status: Never     Passive exposure: Past    Smokeless tobacco: Never   Vaping Use    Vaping status: Never Used   Substance Use Topics    Alcohol use: Not Currently     Alcohol/week: 6.0 standard drinks of alcohol     Types: 3 Shots of liquor, 3 Drinks containing 0.5 oz of alcohol per week    Drug use: No     Family Hx:  Family History   Problem Relation Age of Onset    No Known Problems Mother     Cancer Father     Arthritis Father     Hyperlipidemia Father     Hypertension Father     Asthma Paternal Grandfather      Review of Systems:        Review of Systems   Constitutional:  Negative for activity change, appetite change, " chills, diaphoresis, fatigue, fever and unexpected weight change.   HENT:  Negative for congestion, dental problem, drooling, ear discharge, ear pain, facial swelling, hearing loss, mouth sores, nosebleeds, postnasal drip, rhinorrhea, sinus pressure, sinus pain, sneezing, sore throat, tinnitus, trouble swallowing and voice change.    Eyes:  Negative for photophobia, pain, discharge, redness, itching and visual disturbance.   Respiratory:  Negative for apnea, cough, choking, chest tightness, shortness of breath, wheezing and stridor.    Cardiovascular:  Negative for chest pain, palpitations and leg swelling.   Gastrointestinal:  Negative for abdominal distention, abdominal pain, anal bleeding, blood in stool, constipation, diarrhea, nausea, rectal pain and vomiting.   Endocrine: Negative for cold intolerance, heat intolerance, polydipsia, polyphagia and polyuria.   Genitourinary:  Negative for decreased urine volume, difficulty urinating, dysuria, enuresis, flank pain, frequency, genital sores, hematuria, penile discharge, penile pain, penile swelling, scrotal swelling, testicular pain and urgency.   Musculoskeletal:  Positive for neck pain and neck stiffness. Negative for arthralgias, back pain, gait problem, joint swelling and myalgias.   Skin:  Negative for color change, pallor, rash and wound.   Allergic/Immunologic: Negative for environmental allergies, food allergies and immunocompromised state.   Neurological:  Positive for weakness. Negative for dizziness, tremors, seizures, syncope, facial asymmetry, speech difficulty, light-headedness, numbness and headaches.   Hematological:  Negative for adenopathy. Does not bruise/bleed easily.   Psychiatric/Behavioral:  Negative for agitation, behavioral problems, confusion, decreased concentration, dysphoric mood, hallucinations, self-injury, sleep disturbance and suicidal ideas. The patient is not nervous/anxious and is not hyperactive.       I have reviewed this note  template and all pertinent parts of the review of systems social, family history, surgical history and medication list    Physical Examination:  Vitals:    05/22/25 1454   Temp: 97.7 °F (36.5 °C)      General Appearance:   Well developed, well nourished, well groomed, alert, and cooperative.  Neurological examination:  Neurological Exam   Vital signs were reviewed and documented in the chart  Patient appeared in good neurologic function with normal comprehension fluent speech  Mood and affect are normal  Sense of smell deferred    Pupils symmetric equally reactive funduscopic exam not visualized   Visual fields intact to confrontation  Extraocular movements intact  Face motor function is symmetric  Facial sensations normal  Hearing intact to finger rub hearing intact to finger rub  Tongue is midline  Palate symmetric  Swallowing normal  Shoulder shrug normal  He is got a thick neck he has some degree of rosacea      Muscle bulk and tone normal  5 out of 5 strength no motor drift he is got good strength with some shoulder arthritis subtle right-sided finger external rotators slightly weak on the right  Gait normal intact  Negative Romberg  No clonus long tract signs or myelopathy  Stigmata arthritis in the hands  Reflexes symmetric with exception of his right brachial radialis and left tricep diminished but he is brisk overall  No edema noted and extremities skin appears normal            Review of Imaging/DATA:  Personally reviewed and interpreted MRI of the cervical spine is quite impressive at C5-6 and C6-7 there is central canal stenosis with contouring of the cord and cord compression he has a disc osteophyte off to the right side that is quite impressive C5-6 as well as a left-sided C6-7 disc rupture  Diagnoses/Plan:    Mr. Iverson is a 59 y.o. male   1.  History of pulmonary issues with multiple rounds of steroid use    2.  Cervical radiculopathy right-sided C5-6 referable left-sided C6-7 with superimposed  disc herniations and foraminal stenosis    3.  Spinal cord compression with some degrees of hyperreflexia without evidence of clonus or Wen's    4.  Sleep apnea    5.  Pulmonary issues    I explained the risk benefits and expected outcome of major elective surgery for their problem, complications from approach, and infection, the risk of neurologic implications after surgery as well as need for repeat surgeries and most importantly failure to achieve quality of life improvement from the surgery to the patient.  I was extensive and spent 50 minutes with the patient today talking about arthroplasty versus fusion at the end of the discussions given his recent steroid usage need for ongoing steroid for sinus disease arthritic symptoms otology etc. and his age I think it is reasonable to consider a two-level anterior cervical discectomy of her arthroplasty I did explain the need for adjacent surgeries    After extensive discussion shared decision making plan will be for C5-6 C6-7 disc herniation 5 6 for right arm pain 6 7 for left arm pain explained the risk benefits expected outcome

## 2025-06-03 ENCOUNTER — PRE-ADMISSION TESTING (OUTPATIENT)
Dept: PREADMISSION TESTING | Facility: HOSPITAL | Age: 60
End: 2025-06-03
Payer: COMMERCIAL

## 2025-06-03 VITALS — HEIGHT: 70 IN | WEIGHT: 239.42 LBS | BODY MASS INDEX: 34.28 KG/M2

## 2025-06-03 DIAGNOSIS — M54.2 CERVICALGIA: ICD-10-CM

## 2025-06-03 LAB
ANION GAP SERPL CALCULATED.3IONS-SCNC: 14 MMOL/L (ref 5–15)
BUN SERPL-MCNC: 14 MG/DL (ref 6–20)
BUN/CREAT SERPL: 13.5 (ref 7–25)
CALCIUM SPEC-SCNC: 9.3 MG/DL (ref 8.6–10.5)
CHLORIDE SERPL-SCNC: 102 MMOL/L (ref 98–107)
CO2 SERPL-SCNC: 23 MMOL/L (ref 22–29)
CREAT SERPL-MCNC: 1.04 MG/DL (ref 0.76–1.27)
DEPRECATED RDW RBC AUTO: 49 FL (ref 37–54)
EGFRCR SERPLBLD CKD-EPI 2021: 83.2 ML/MIN/1.73
ERYTHROCYTE [DISTWIDTH] IN BLOOD BY AUTOMATED COUNT: 13.3 % (ref 12.3–15.4)
GLUCOSE SERPL-MCNC: 116 MG/DL (ref 65–99)
HBA1C MFR BLD: 5.2 % (ref 4.8–5.6)
HCT VFR BLD AUTO: 49.9 % (ref 37.5–51)
HGB BLD-MCNC: 16.9 G/DL (ref 13–17.7)
MCH RBC QN AUTO: 33.6 PG (ref 26.6–33)
MCHC RBC AUTO-ENTMCNC: 33.9 G/DL (ref 31.5–35.7)
MCV RBC AUTO: 99.2 FL (ref 79–97)
PLATELET # BLD AUTO: 212 10*3/MM3 (ref 140–450)
PMV BLD AUTO: 8.5 FL (ref 6–12)
POTASSIUM SERPL-SCNC: 3.8 MMOL/L (ref 3.5–5.2)
QT INTERVAL: 420 MS
QTC INTERVAL: 429 MS
RBC # BLD AUTO: 5.03 10*6/MM3 (ref 4.14–5.8)
SODIUM SERPL-SCNC: 139 MMOL/L (ref 136–145)
WBC NRBC COR # BLD AUTO: 3.64 10*3/MM3 (ref 3.4–10.8)

## 2025-06-03 PROCEDURE — 80048 BASIC METABOLIC PNL TOTAL CA: CPT

## 2025-06-03 PROCEDURE — 87081 CULTURE SCREEN ONLY: CPT

## 2025-06-03 PROCEDURE — 93005 ELECTROCARDIOGRAM TRACING: CPT

## 2025-06-03 PROCEDURE — 85027 COMPLETE CBC AUTOMATED: CPT

## 2025-06-03 PROCEDURE — 83036 HEMOGLOBIN GLYCOSYLATED A1C: CPT

## 2025-06-03 PROCEDURE — 36415 COLL VENOUS BLD VENIPUNCTURE: CPT

## 2025-06-03 NOTE — PAT
An arrival time for procedure was not provided during PAT visit. If patient had any questions or concerns about their arrival time, they were instructed to contact their surgeon/physician.  Additionally, if the patient referred to an arrival time that was acquired from their my chart account, patient was encouraged to verify that time with their surgeon/physician. Arrival times are NOT provided in Pre Admission Testing Department.    Patient viewed general PAT education video as instructed in their preoperative information received from their surgeon.  Patient stated the general PAT education video was viewed in its entirety and survey completed.  Copies of PAT general education handouts (Incentive Spirometry, Meds to Beds Program, Patient Belongings, Pre-op skin preparation instructions, Blood Glucose testing, Visitor policy, Surgery FAQ, Code H) distributed to patient if not printed. Education related to the PAT pass and skin preparation for surgery (if applicable) completed in PAT as a reinforcement to PAT education video. Patient instructed to return PAT pass provided today as well as completed skin preparation sheet (if applicable) on the day of procedure.     Additionally if patient had not viewed video yet but intended to view it at home or in our waiting area, then referred them to the handout with QR code/link provided during PAT visit.  Encouraged patient/family to read PAT general education handouts thoroughly and notify PAT staff with any questions or concerns. Patient verbalized understanding of all information and priority content.    Patient denies any current skin issues.     Patient instructed to drink 20 ounces of Gatorade or Gatorlyte (if diabetic) and it needs to be completed 1 hour (for Main OR patients) or 2 hours (scheduled  section & [facility] patients) before given arrival time for procedure (NO RED Gatorade and NO Gatorade Zero).    Patient verbalized understanding.    Bactroban (if  prescribed) and Chlorhexidine Prescription prescribed by physician before PAT visit.  Verified with patient that medication(s) were picked up from their pharmacy.  Written instructions given to patient during PAT visit.  Patient/family also instructed to complete skin prep checklist and return the checklist on the day of surgery to preoperative staff.  Patient/family verbalized understanding.    Patient to apply Chlorhexadine wipes  to surgical area (as instructed) the night before procedure and the AM of procedure. Wipes provided.    EKG in chart, pulmonary clearance in chart

## 2025-06-04 LAB — MRSA SPEC QL CULT: NORMAL

## 2025-06-06 DIAGNOSIS — R20.0 HAND NUMBNESS: ICD-10-CM

## 2025-06-06 DIAGNOSIS — G95.20 CERVICAL SPINAL CORD COMPRESSION: Primary | ICD-10-CM

## 2025-08-25 ENCOUNTER — OFFICE VISIT (OUTPATIENT)
Dept: ORTHOPEDIC SURGERY | Facility: CLINIC | Age: 60
End: 2025-08-25
Payer: COMMERCIAL

## 2025-08-25 VITALS
SYSTOLIC BLOOD PRESSURE: 130 MMHG | HEIGHT: 70 IN | DIASTOLIC BLOOD PRESSURE: 76 MMHG | WEIGHT: 236 LBS | BODY MASS INDEX: 33.79 KG/M2

## 2025-08-25 DIAGNOSIS — G56.02 CARPAL TUNNEL SYNDROME OF LEFT WRIST: Primary | ICD-10-CM

## 2025-08-25 DIAGNOSIS — M19.021 ARTHRITIS OF RIGHT ELBOW: ICD-10-CM

## 2025-08-25 DIAGNOSIS — M25.522 LEFT ELBOW PAIN: ICD-10-CM

## 2025-08-25 PROCEDURE — 1160F RVW MEDS BY RX/DR IN RCRD: CPT | Performed by: STUDENT IN AN ORGANIZED HEALTH CARE EDUCATION/TRAINING PROGRAM

## 2025-08-25 PROCEDURE — 20605 DRAIN/INJ JOINT/BURSA W/O US: CPT | Performed by: STUDENT IN AN ORGANIZED HEALTH CARE EDUCATION/TRAINING PROGRAM

## 2025-08-25 PROCEDURE — 3075F SYST BP GE 130 - 139MM HG: CPT | Performed by: STUDENT IN AN ORGANIZED HEALTH CARE EDUCATION/TRAINING PROGRAM

## 2025-08-25 PROCEDURE — 3078F DIAST BP <80 MM HG: CPT | Performed by: STUDENT IN AN ORGANIZED HEALTH CARE EDUCATION/TRAINING PROGRAM

## 2025-08-25 PROCEDURE — 1159F MED LIST DOCD IN RCRD: CPT | Performed by: STUDENT IN AN ORGANIZED HEALTH CARE EDUCATION/TRAINING PROGRAM

## 2025-08-25 PROCEDURE — 99213 OFFICE O/P EST LOW 20 MIN: CPT | Performed by: STUDENT IN AN ORGANIZED HEALTH CARE EDUCATION/TRAINING PROGRAM

## 2025-08-25 RX ORDER — DEXAMETHASONE SODIUM PHOSPHATE 4 MG/ML
2 INJECTION, SOLUTION INTRA-ARTICULAR; INTRALESIONAL; INTRAMUSCULAR; INTRAVENOUS; SOFT TISSUE
Status: COMPLETED | OUTPATIENT
Start: 2025-08-25 | End: 2025-08-25

## 2025-08-25 RX ORDER — LIDOCAINE HYDROCHLORIDE 10 MG/ML
0.5 INJECTION, SOLUTION EPIDURAL; INFILTRATION; INTRACAUDAL; PERINEURAL
Status: COMPLETED | OUTPATIENT
Start: 2025-08-25 | End: 2025-08-25

## 2025-08-25 RX ADMIN — LIDOCAINE HYDROCHLORIDE 0.5 ML: 10 INJECTION, SOLUTION EPIDURAL; INFILTRATION; INTRACAUDAL; PERINEURAL at 08:50

## 2025-08-25 RX ADMIN — DEXAMETHASONE SODIUM PHOSPHATE 2 MG: 4 INJECTION, SOLUTION INTRA-ARTICULAR; INTRALESIONAL; INTRAMUSCULAR; INTRAVENOUS; SOFT TISSUE at 08:50

## (undated) DEVICE — AMD ANTIMICROBIAL GAUZE SPONGES,12 PLY USP TYPE VII, 0.2% POLYHEXAMETHYLENE BIGUANIDE HCI (PHMB): Brand: CURITY

## (undated) DEVICE — STRYKER PERFORMANCE SERIES SAGITTAL BLADE: Brand: STRYKER PERFORMANCE SERIES

## (undated) DEVICE — ANTIBACTERIAL UNDYED BRAIDED (POLYGLACTIN 910), SYNTHETIC ABSORBABLE SUTURE: Brand: COATED VICRYL

## (undated) DEVICE — SUT MONOCRYL PLS ANTIB UND 3/0  PS1 27IN

## (undated) DEVICE — INTENDED FOR TISSUE SEPARATION, AND OTHER PROCEDURES THAT REQUIRE A SHARP SURGICAL BLADE TO PUNCTURE OR CUT.: Brand: BARD-PARKER ® CARBON RIB-BACK BLADES

## (undated) DEVICE — TRAP FLD MINIVAC MEGADYNE 100ML

## (undated) DEVICE — TRY EPID SFTY 18G 3.5IN 1T7680

## (undated) DEVICE — GLV SURG SENSICARE W/ALOE PF LF 9 STRL

## (undated) DEVICE — DRSNG SURESITE WNDW 4X4.5

## (undated) DEVICE — PAD,ARMBOARD,CONV,FOAM,2X8X20",12PR/CS: Brand: MEDLINE

## (undated) DEVICE — Device

## (undated) DEVICE — DRSNG PAD ABD 8X10IN STRL

## (undated) DEVICE — KT NAVITRACKER KN ORTHOSOFT CIS STRL

## (undated) DEVICE — SYS CLS SKIN PREMIERPRO EXOFINFUSION 22CM

## (undated) DEVICE — UNDERCAST PADDING: Brand: DEROYAL

## (undated) DEVICE — SYR LUERLOK 30CC

## (undated) DEVICE — BNDG ELAS W/CLIP 6IN 10YD LF STRL

## (undated) DEVICE — PIN DRL NOHEAD TROC 3.2X75MM

## (undated) DEVICE — 2963 MEDIPORE SOFT CLOTH TAPE 3 IN X 10 YD 12 RLS/CS: Brand: 3M™ MEDIPORE™

## (undated) DEVICE — ENCORE® LATEX MICRO SIZE 8.5, STERILE LATEX POWDER-FREE SURGICAL GLOVE: Brand: ENCORE

## (undated) DEVICE — KT PUMP INFUBLOCK MDL 2100 PMKITSOLIS

## (undated) DEVICE — PATIENT RETURN ELECTRODE, SINGLE-USE, CONTACT QUALITY MONITORING, ADULT, WITH 9FT CORD, FOR PATIENTS WEIGING OVER 33LBS. (15KG): Brand: MEGADYNE

## (undated) DEVICE — PK HIP TOTL UNIV 10

## (undated) DEVICE — GLV SURG PREMIERPRO MIC LTX PF SZ8 BRN

## (undated) DEVICE — DELFI MATCHING LIMB PROTECTION SLEEVES (MLPS) HELP PROTECT THE PATIENT’S LIMB FROM POSSIBLE WRINKLING, PINCHING AND SHEARING OF SKIN AND SOFT TISSUES OF THE LIMB.EACH DELFI MATCHING LIMB PROTECTION SLEEVE IS INTENDED FOR USE WITH A SPECIFIC DELFI TOURNIQUET CUFF. THIS SLEEVE IS SPECIFICALLY FOR THIGH.: Brand: MATCHING LIMB PROTECTION SLEEVES - VARI-FIT

## (undated) DEVICE — PK KN TOTL 10

## (undated) DEVICE — HDRST POSTIN FM CRDL TRACH SLOT 9X8X4IN

## (undated) DEVICE — SCRW HEX PERSONA FML 2.5X25MM PK/2
Type: IMPLANTABLE DEVICE | Site: KNEE | Status: NON-FUNCTIONAL
Removed: 2024-08-14

## (undated) DEVICE — SYS SKIN CLS DERMABOND PRINEO W/22CM MESH TP

## (undated) DEVICE — CVR HNDL LT SURG ACCSSRY BLU STRL

## (undated) DEVICE — BLANKT WARM UPPR/BDY ARM/OUT 57X196CM

## (undated) DEVICE — PENCL SMOKE/EVAC MEGADYNE TELESCP 10FT

## (undated) DEVICE — 450 ML BOTTLE OF 0.05% CHLORHEXIDINE GLUCONATE IN 99.95% STERILE WATER FOR IRRIGATION, USP AND APPLICATOR.: Brand: IRRISEPT ANTIMICROBIAL WOUND LAVAGE

## (undated) DEVICE — CONTAINER,SPECIMEN,OR STERILE,4OZ: Brand: MEDLINE

## (undated) DEVICE — NEEDLE, QUINCKE, 18GX3.5": Brand: MEDLINE

## (undated) DEVICE — SKN PREP SPNG STKS PVP PNT STR: Brand: MEDLINE INDUSTRIES, INC.

## (undated) DEVICE — DRP ROBOTIC ROSA BX/20

## (undated) DEVICE — PIN TEMP FIX ORTHOSOFT CAS FLUT 3.2X150MM STRL 1P/U